# Patient Record
Sex: MALE | Race: WHITE | URBAN - METROPOLITAN AREA
[De-identification: names, ages, dates, MRNs, and addresses within clinical notes are randomized per-mention and may not be internally consistent; named-entity substitution may affect disease eponyms.]

---

## 2017-01-12 RX ORDER — ATORVASTATIN CALCIUM 10 MG/1
TABLET, FILM COATED ORAL
Qty: 90 TABLET | Refills: 2 | Status: SHIPPED | OUTPATIENT
Start: 2017-01-12

## 2017-01-12 RX ORDER — FINASTERIDE 5 MG/1
TABLET, FILM COATED ORAL
Qty: 90 TABLET | Refills: 2 | Status: SHIPPED | OUTPATIENT
Start: 2017-01-12

## 2017-04-03 RX ORDER — LABETALOL 100 MG/1
TABLET, FILM COATED ORAL
Qty: 180 TABLET | Refills: 2 | Status: SHIPPED | OUTPATIENT
Start: 2017-04-03

## 2021-03-30 ENCOUNTER — IMMUNIZATION (OUTPATIENT)
Dept: PRIMARY CARE CLINIC | Age: 71
End: 2021-03-30
Payer: MEDICARE

## 2021-03-30 PROCEDURE — 91301 COVID-19, MODERNA VACCINE 100MCG/0.5ML DOSE: CPT | Performed by: FAMILY MEDICINE

## 2021-03-30 PROCEDURE — 0012A COVID-19, MODERNA VACCINE 100MCG/0.5ML DOSE: CPT | Performed by: FAMILY MEDICINE

## 2024-09-25 ENCOUNTER — APPOINTMENT (OUTPATIENT)
Dept: CT IMAGING | Age: 74
End: 2024-09-25
Payer: MEDICARE

## 2024-09-25 ENCOUNTER — HOSPITAL ENCOUNTER (EMERGENCY)
Age: 74
Discharge: HOME | End: 2024-09-25
Attending: EMERGENCY MEDICINE
Payer: MEDICARE

## 2024-09-25 VITALS
HEART RATE: 68 BPM | BODY MASS INDEX: 24.81 KG/M2 | HEIGHT: 70 IN | TEMPERATURE: 97.8 F | OXYGEN SATURATION: 96 % | SYSTOLIC BLOOD PRESSURE: 136 MMHG | DIASTOLIC BLOOD PRESSURE: 79 MMHG | RESPIRATION RATE: 18 BRPM | WEIGHT: 173.3 LBS

## 2024-09-25 DIAGNOSIS — K85.80 OTHER ACUTE PANCREATITIS WITHOUT INFECTION OR NECROSIS: Primary | ICD-10-CM

## 2024-09-25 DIAGNOSIS — R10.13 ABDOMINAL PAIN, EPIGASTRIC: ICD-10-CM

## 2024-09-25 LAB
ALBUMIN SERPL-MCNC: 4.3 G/DL (ref 3.4–5)
ALBUMIN/GLOB SERPL: 2 {RATIO} (ref 1.1–2.2)
ALP SERPL-CCNC: 54 U/L (ref 40–129)
ALT SERPL-CCNC: 17 U/L (ref 10–40)
ANION GAP SERPL CALCULATED.3IONS-SCNC: 11 MMOL/L (ref 3–16)
AST SERPL-CCNC: 20 U/L (ref 15–37)
BASOPHILS # BLD: 0 K/UL (ref 0–0.2)
BASOPHILS NFR BLD: 0.3 %
BILIRUB SERPL-MCNC: 0.6 MG/DL (ref 0–1)
BILIRUB UR QL STRIP.AUTO: NEGATIVE
BUN SERPL-MCNC: 16 MG/DL (ref 7–20)
CALCIUM SERPL-MCNC: 9.7 MG/DL (ref 8.3–10.6)
CHLORIDE SERPL-SCNC: 106 MMOL/L (ref 99–110)
CLARITY UR: CLEAR
CO2 SERPL-SCNC: 22 MMOL/L (ref 21–32)
COLOR UR: YELLOW
CREAT SERPL-MCNC: 0.9 MG/DL (ref 0.8–1.3)
DEPRECATED RDW RBC AUTO: 13.4 % (ref 12.4–15.4)
EKG ATRIAL RATE: 61 BPM
EKG DIAGNOSIS: NORMAL
EKG P AXIS: 61 DEGREES
EKG P-R INTERVAL: 198 MS
EKG Q-T INTERVAL: 384 MS
EKG QRS DURATION: 90 MS
EKG QTC CALCULATION (BAZETT): 386 MS
EKG R AXIS: -6 DEGREES
EKG T AXIS: 25 DEGREES
EKG VENTRICULAR RATE: 61 BPM
EOSINOPHIL # BLD: 0.1 K/UL (ref 0–0.6)
EOSINOPHIL NFR BLD: 0.6 %
GFR SERPLBLD CREATININE-BSD FMLA CKD-EPI: >90 ML/MIN/{1.73_M2}
GLUCOSE SERPL-MCNC: 157 MG/DL (ref 70–99)
GLUCOSE UR STRIP.AUTO-MCNC: NEGATIVE MG/DL
HCT VFR BLD AUTO: 45.1 % (ref 40.5–52.5)
HGB BLD-MCNC: 14.9 G/DL (ref 13.5–17.5)
HGB UR QL STRIP.AUTO: NEGATIVE
KETONES UR STRIP.AUTO-MCNC: NEGATIVE MG/DL
LEUKOCYTE ESTERASE UR QL STRIP.AUTO: NEGATIVE
LIPASE SERPL-CCNC: 2379 U/L (ref 13–60)
LYMPHOCYTES # BLD: 1.9 K/UL (ref 1–5.1)
LYMPHOCYTES NFR BLD: 13.5 %
MCH RBC QN AUTO: 31.4 PG (ref 26–34)
MCHC RBC AUTO-ENTMCNC: 33.1 G/DL (ref 31–36)
MCV RBC AUTO: 94.9 FL (ref 80–100)
MONOCYTES # BLD: 0.7 K/UL (ref 0–1.3)
MONOCYTES NFR BLD: 4.6 %
NEUTROPHILS # BLD: 11.7 K/UL (ref 1.7–7.7)
NEUTROPHILS NFR BLD: 81 %
NITRITE UR QL STRIP.AUTO: NEGATIVE
PH UR STRIP.AUTO: 7 [PH] (ref 5–8)
PLATELET # BLD AUTO: 179 K/UL (ref 135–450)
PMV BLD AUTO: 9.2 FL (ref 5–10.5)
POTASSIUM SERPL-SCNC: 3.9 MMOL/L (ref 3.5–5.1)
PROT SERPL-MCNC: 6.5 G/DL (ref 6.4–8.2)
PROT UR STRIP.AUTO-MCNC: NEGATIVE MG/DL
RBC # BLD AUTO: 4.75 M/UL (ref 4.2–5.9)
SODIUM SERPL-SCNC: 139 MMOL/L (ref 136–145)
SP GR UR STRIP.AUTO: 1.02 (ref 1–1.03)
TROPONIN, HIGH SENSITIVITY: 8 NG/L (ref 0–22)
UA COMPLETE W REFLEX CULTURE PNL UR: NORMAL
UA DIPSTICK W REFLEX MICRO PNL UR: NORMAL
URN SPEC COLLECT METH UR: NORMAL
UROBILINOGEN UR STRIP-ACNC: 0.2 E.U./DL
WBC # BLD AUTO: 14.4 K/UL (ref 4–11)

## 2024-09-25 PROCEDURE — 80053 COMPREHEN METABOLIC PANEL: CPT

## 2024-09-25 PROCEDURE — 85025 COMPLETE CBC W/AUTO DIFF WBC: CPT

## 2024-09-25 PROCEDURE — 2580000003 HC RX 258

## 2024-09-25 PROCEDURE — 96375 TX/PRO/DX INJ NEW DRUG ADDON: CPT

## 2024-09-25 PROCEDURE — 83690 ASSAY OF LIPASE: CPT

## 2024-09-25 PROCEDURE — 93010 ELECTROCARDIOGRAM REPORT: CPT | Performed by: INTERNAL MEDICINE

## 2024-09-25 PROCEDURE — 81003 URINALYSIS AUTO W/O SCOPE: CPT

## 2024-09-25 PROCEDURE — 84484 ASSAY OF TROPONIN QUANT: CPT

## 2024-09-25 PROCEDURE — 93005 ELECTROCARDIOGRAM TRACING: CPT

## 2024-09-25 PROCEDURE — 6360000002 HC RX W HCPCS

## 2024-09-25 PROCEDURE — 6360000004 HC RX CONTRAST MEDICATION

## 2024-09-25 PROCEDURE — 99285 EMERGENCY DEPT VISIT HI MDM: CPT

## 2024-09-25 PROCEDURE — 74177 CT ABD & PELVIS W/CONTRAST: CPT

## 2024-09-25 PROCEDURE — 96374 THER/PROPH/DIAG INJ IV PUSH: CPT

## 2024-09-25 RX ORDER — KETOROLAC TROMETHAMINE 30 MG/ML
15 INJECTION, SOLUTION INTRAMUSCULAR; INTRAVENOUS ONCE
Status: COMPLETED | OUTPATIENT
Start: 2024-09-25 | End: 2024-09-25

## 2024-09-25 RX ORDER — MORPHINE SULFATE 4 MG/ML
4 INJECTION, SOLUTION INTRAMUSCULAR; INTRAVENOUS
Status: COMPLETED | OUTPATIENT
Start: 2024-09-25 | End: 2024-09-25

## 2024-09-25 RX ORDER — AMLODIPINE BESYLATE 5 MG/1
TABLET ORAL
COMMUNITY
Start: 2024-03-28

## 2024-09-25 RX ORDER — HYDROCODONE BITARTRATE AND ACETAMINOPHEN 5; 325 MG/1; MG/1
1 TABLET ORAL EVERY 6 HOURS PRN
Qty: 12 TABLET | Refills: 0 | Status: SHIPPED | OUTPATIENT
Start: 2024-09-25 | End: 2024-09-28

## 2024-09-25 RX ORDER — ONDANSETRON 4 MG/1
4 TABLET, FILM COATED ORAL 3 TIMES DAILY PRN
Qty: 15 TABLET | Refills: 0 | Status: SHIPPED | OUTPATIENT
Start: 2024-09-25

## 2024-09-25 RX ORDER — SODIUM CHLORIDE, SODIUM LACTATE, POTASSIUM CHLORIDE, AND CALCIUM CHLORIDE .6; .31; .03; .02 G/100ML; G/100ML; G/100ML; G/100ML
1000 INJECTION, SOLUTION INTRAVENOUS ONCE
Status: COMPLETED | OUTPATIENT
Start: 2024-09-25 | End: 2024-09-25

## 2024-09-25 RX ORDER — IOPAMIDOL 755 MG/ML
75 INJECTION, SOLUTION INTRAVASCULAR
Status: COMPLETED | OUTPATIENT
Start: 2024-09-25 | End: 2024-09-25

## 2024-09-25 RX ORDER — ONDANSETRON 2 MG/ML
4 INJECTION INTRAMUSCULAR; INTRAVENOUS ONCE
Status: COMPLETED | OUTPATIENT
Start: 2024-09-25 | End: 2024-09-25

## 2024-09-25 RX ADMIN — ONDANSETRON 4 MG: 2 INJECTION INTRAMUSCULAR; INTRAVENOUS at 09:08

## 2024-09-25 RX ADMIN — IOPAMIDOL 75 ML: 755 INJECTION, SOLUTION INTRAVENOUS at 11:28

## 2024-09-25 RX ADMIN — MORPHINE SULFATE 4 MG: 4 INJECTION, SOLUTION INTRAMUSCULAR; INTRAVENOUS at 09:09

## 2024-09-25 RX ADMIN — SODIUM CHLORIDE, POTASSIUM CHLORIDE, SODIUM LACTATE AND CALCIUM CHLORIDE 1000 ML: 600; 310; 30; 20 INJECTION, SOLUTION INTRAVENOUS at 09:08

## 2024-09-25 RX ADMIN — KETOROLAC TROMETHAMINE 15 MG: 30 INJECTION, SOLUTION INTRAMUSCULAR at 11:53

## 2024-09-25 ASSESSMENT — PAIN DESCRIPTION - ORIENTATION: ORIENTATION: MID

## 2024-09-25 ASSESSMENT — PAIN SCALES - GENERAL
PAINLEVEL_OUTOF10: 5
PAINLEVEL_OUTOF10: 5
PAINLEVEL_OUTOF10: 7

## 2024-09-25 ASSESSMENT — PAIN DESCRIPTION - PAIN TYPE: TYPE: ACUTE PAIN

## 2024-09-25 ASSESSMENT — PAIN DESCRIPTION - DESCRIPTORS: DESCRIPTORS: TENDER;SHARP

## 2024-09-25 ASSESSMENT — PAIN DESCRIPTION - LOCATION
LOCATION: ABDOMEN
LOCATION: ABDOMEN

## 2024-09-25 ASSESSMENT — LIFESTYLE VARIABLES
HOW OFTEN DO YOU HAVE A DRINK CONTAINING ALCOHOL: NEVER
HOW MANY STANDARD DRINKS CONTAINING ALCOHOL DO YOU HAVE ON A TYPICAL DAY: PATIENT DOES NOT DRINK

## 2024-09-25 ASSESSMENT — PAIN - FUNCTIONAL ASSESSMENT: PAIN_FUNCTIONAL_ASSESSMENT: 0-10

## 2024-10-09 RX ORDER — ASCORBIC ACID 500 MG
500 TABLET ORAL DAILY
COMMUNITY

## 2024-10-09 RX ORDER — OMEGA-3S/DHA/EPA/FISH OIL/D3 300MG-1000
400 CAPSULE ORAL DAILY
COMMUNITY

## 2024-10-09 RX ORDER — VITAMIN E 268 MG
400 CAPSULE ORAL DAILY
COMMUNITY

## 2024-10-09 RX ORDER — CHLORAL HYDRATE 500 MG
CAPSULE ORAL DAILY
COMMUNITY

## 2024-10-09 RX ORDER — ASCORBIC ACID 1000 MG
TABLET ORAL
COMMUNITY

## 2024-10-09 RX ORDER — M-VIT,TX,IRON,MINS/CALC/FOLIC 27MG-0.4MG
1 TABLET ORAL DAILY
COMMUNITY

## 2024-10-09 NOTE — PROGRESS NOTES
[facility] PRE-OPERATIVE INSTRUCTIONS       DOS: __10/23/2024__        Pre-Op Instructions     Patients receiving local anesthetic only will arrive one hour prior to the procedure, all other patients will arrive 1.5 hours prior to procedure time.    [x]  A History and Physical will be required within 30 days prior to surgery date. Some patients may require cardiac or pulmonary clearance. H&P will be completed DOS for Endo/colonoscopy patients.     [x]  Reviewed Medical and Surgical history, medication list, confirmed with patient any implants, allergies, bleeding disorders, DANO and reactions to Anesthesia.    [x]  If there is a change in physical condition between now and the day of surgery, please notify your surgeon. This includes a cough, cold, fever, sore throat, nausea, vomiting and diarrhea. Also notify your surgeon if you experience dizziness, shortness of breath or blurred vision.    [x] Reviewed hx of C-Diff, MRSA, VRE and/or recent use of Antibiotics     [x]  All patients having a procedure must have a ride home by a responsible person that is over the age of 18 and ensure it is someone that we can share medical information with. After discharge, a responsible adult needs to stay with you for 24 hours. There is a limit of 2 adult visitors per room.     If unable to secure ride and/or care taker, please contact surgeon's office.      [x]  No alcohol, smoking or marijuana use 24 hours prior to surgery. Any use of recreational drugs must be stopped 5 days prior to surgery.     [x]  NPO after midnight (Any heart, BP, seizure, thyroid and breathing medications are okay to take the morning of surgery with a small sip of water 4 hours prior to procedure).    The morning of surgery, you may brush your teeth, just no swallowing water. Also, NO gum, candy, mints or ice chips.    []  For Colonoscopy's, follow prep-instructions as indicated by physician.     []  Patients with a insulin pump, keep set

## 2024-10-22 ENCOUNTER — ANESTHESIA EVENT (OUTPATIENT)
Age: 74
End: 2024-10-22
Payer: MEDICARE

## 2024-10-23 ENCOUNTER — HOSPITAL ENCOUNTER (OUTPATIENT)
Age: 74
Setting detail: OUTPATIENT SURGERY
Discharge: HOME | End: 2024-10-23
Attending: INTERNAL MEDICINE | Admitting: INTERNAL MEDICINE
Payer: MEDICARE

## 2024-10-23 ENCOUNTER — ANESTHESIA (OUTPATIENT)
Age: 74
End: 2024-10-23
Payer: MEDICARE

## 2024-10-23 ENCOUNTER — APPOINTMENT (OUTPATIENT)
Age: 74
End: 2024-10-23
Attending: INTERNAL MEDICINE
Payer: MEDICARE

## 2024-10-23 VITALS
WEIGHT: 168 LBS | OXYGEN SATURATION: 98 % | BODY MASS INDEX: 24.05 KG/M2 | HEIGHT: 70 IN | SYSTOLIC BLOOD PRESSURE: 155 MMHG | TEMPERATURE: 97.2 F | HEART RATE: 64 BPM | DIASTOLIC BLOOD PRESSURE: 86 MMHG | RESPIRATION RATE: 20 BRPM

## 2024-10-23 DIAGNOSIS — K85.00 IDIOPATHIC ACUTE PANCREATITIS WITHOUT INFECTION OR NECROSIS: ICD-10-CM

## 2024-10-23 PROCEDURE — 2580000003 HC RX 258: Performed by: ANESTHESIOLOGY

## 2024-10-23 PROCEDURE — 6370000000 HC RX 637 (ALT 250 FOR IP): Performed by: INTERNAL MEDICINE

## 2024-10-23 PROCEDURE — 7100000010 HC PHASE II RECOVERY - FIRST 15 MIN: Performed by: INTERNAL MEDICINE

## 2024-10-23 PROCEDURE — C1753 CATH, INTRAVAS ULTRASOUND: HCPCS | Performed by: INTERNAL MEDICINE

## 2024-10-23 PROCEDURE — 7100000011 HC PHASE II RECOVERY - ADDTL 15 MIN: Performed by: INTERNAL MEDICINE

## 2024-10-23 PROCEDURE — 3700000000 HC ANESTHESIA ATTENDED CARE: Performed by: INTERNAL MEDICINE

## 2024-10-23 PROCEDURE — 3609018500 HC EGD US SCOPE W/ADJACENT STRUCTURES: Performed by: INTERNAL MEDICINE

## 2024-10-23 PROCEDURE — 86301 IMMUNOASSAY TUMOR CA 19-9: CPT

## 2024-10-23 PROCEDURE — 7100000000 HC PACU RECOVERY - FIRST 15 MIN: Performed by: INTERNAL MEDICINE

## 2024-10-23 PROCEDURE — 3609015100 HC ERCP STENT PLACEMENT BILIARY/PANCREATIC DUCT: Performed by: INTERNAL MEDICINE

## 2024-10-23 PROCEDURE — 6360000002 HC RX W HCPCS: Performed by: NURSE ANESTHETIST, CERTIFIED REGISTERED

## 2024-10-23 PROCEDURE — 6360000004 HC RX CONTRAST MEDICATION: Performed by: INTERNAL MEDICINE

## 2024-10-23 PROCEDURE — C2625 STENT, NON-COR, TEM W/DEL SY: HCPCS | Performed by: INTERNAL MEDICINE

## 2024-10-23 PROCEDURE — 7100000001 HC PACU RECOVERY - ADDTL 15 MIN: Performed by: INTERNAL MEDICINE

## 2024-10-23 PROCEDURE — 3609014900 HC ERCP W/SPHINCTEROTOMY &/OR PAPILLOTOMY: Performed by: INTERNAL MEDICINE

## 2024-10-23 PROCEDURE — 2720000010 HC SURG SUPPLY STERILE: Performed by: INTERNAL MEDICINE

## 2024-10-23 PROCEDURE — 6360000002 HC RX W HCPCS: Performed by: INTERNAL MEDICINE

## 2024-10-23 PROCEDURE — 2709999900 HC NON-CHARGEABLE SUPPLY: Performed by: INTERNAL MEDICINE

## 2024-10-23 PROCEDURE — 88112 CYTOPATH CELL ENHANCE TECH: CPT

## 2024-10-23 PROCEDURE — 2580000003 HC RX 258: Performed by: NURSE ANESTHETIST, CERTIFIED REGISTERED

## 2024-10-23 PROCEDURE — 3700000001 HC ADD 15 MINUTES (ANESTHESIA): Performed by: INTERNAL MEDICINE

## 2024-10-23 PROCEDURE — 88304 TISSUE EXAM BY PATHOLOGIST: CPT

## 2024-10-23 PROCEDURE — 3609015200 HC ERCP REMOVE CALCULI/DEBRIS BILIARY/PANCREAS DUCT: Performed by: INTERNAL MEDICINE

## 2024-10-23 PROCEDURE — 88305 TISSUE EXAM BY PATHOLOGIST: CPT

## 2024-10-23 PROCEDURE — 2500000003 HC RX 250 WO HCPCS: Performed by: NURSE ANESTHETIST, CERTIFIED REGISTERED

## 2024-10-23 PROCEDURE — 74328 X-RAY BILE DUCT ENDOSCOPY: CPT

## 2024-10-23 DEVICE — OASIS ONE ACTION STENT INTRODUCTION SYSTEM, WITH PRE-LOADED COTTON LEUNG BILIARY STENT
Type: IMPLANTABLE DEVICE | Status: FUNCTIONAL
Brand: OASIS

## 2024-10-23 RX ORDER — SODIUM CHLORIDE 0.9 % (FLUSH) 0.9 %
5-40 SYRINGE (ML) INJECTION PRN
Status: DISCONTINUED | OUTPATIENT
Start: 2024-10-23 | End: 2024-10-23 | Stop reason: HOSPADM

## 2024-10-23 RX ORDER — GLYCOPYRROLATE 0.2 MG/ML
INJECTION INTRAMUSCULAR; INTRAVENOUS
Status: DISCONTINUED | OUTPATIENT
Start: 2024-10-23 | End: 2024-10-23 | Stop reason: SDUPTHER

## 2024-10-23 RX ORDER — SODIUM CHLORIDE 9 MG/ML
INJECTION, SOLUTION INTRAVENOUS PRN
Status: DISCONTINUED | OUTPATIENT
Start: 2024-10-23 | End: 2024-10-23 | Stop reason: HOSPADM

## 2024-10-23 RX ORDER — DIPHENHYDRAMINE HYDROCHLORIDE 50 MG/ML
12.5 INJECTION INTRAMUSCULAR; INTRAVENOUS
Status: DISCONTINUED | OUTPATIENT
Start: 2024-10-23 | End: 2024-10-23 | Stop reason: HOSPADM

## 2024-10-23 RX ORDER — CIPROFLOXACIN 2 MG/ML
400 INJECTION, SOLUTION INTRAVENOUS ONCE
Status: COMPLETED | OUTPATIENT
Start: 2024-10-23 | End: 2024-10-23

## 2024-10-23 RX ORDER — FENTANYL CITRATE 50 UG/ML
50 INJECTION, SOLUTION INTRAMUSCULAR; INTRAVENOUS EVERY 5 MIN PRN
Status: DISCONTINUED | OUTPATIENT
Start: 2024-10-23 | End: 2024-10-23 | Stop reason: HOSPADM

## 2024-10-23 RX ORDER — SUCCINYLCHOLINE/SOD CL,ISO/PF 200MG/10ML
SYRINGE (ML) INTRAVENOUS
Status: DISCONTINUED | OUTPATIENT
Start: 2024-10-23 | End: 2024-10-23 | Stop reason: SDUPTHER

## 2024-10-23 RX ORDER — SODIUM CHLORIDE 0.9 % (FLUSH) 0.9 %
5-40 SYRINGE (ML) INJECTION EVERY 12 HOURS SCHEDULED
Status: DISCONTINUED | OUTPATIENT
Start: 2024-10-23 | End: 2024-10-23 | Stop reason: HOSPADM

## 2024-10-23 RX ORDER — HALOPERIDOL 5 MG/ML
1 INJECTION INTRAMUSCULAR
Status: DISCONTINUED | OUTPATIENT
Start: 2024-10-23 | End: 2024-10-23 | Stop reason: HOSPADM

## 2024-10-23 RX ORDER — LORAZEPAM 2 MG/ML
0.5 INJECTION INTRAMUSCULAR
Status: DISCONTINUED | OUTPATIENT
Start: 2024-10-23 | End: 2024-10-23 | Stop reason: HOSPADM

## 2024-10-23 RX ORDER — FENTANYL CITRATE 50 UG/ML
INJECTION, SOLUTION INTRAMUSCULAR; INTRAVENOUS
Status: DISCONTINUED | OUTPATIENT
Start: 2024-10-23 | End: 2024-10-23 | Stop reason: SDUPTHER

## 2024-10-23 RX ORDER — NALOXONE HYDROCHLORIDE 0.4 MG/ML
INJECTION, SOLUTION INTRAMUSCULAR; INTRAVENOUS; SUBCUTANEOUS PRN
Status: DISCONTINUED | OUTPATIENT
Start: 2024-10-23 | End: 2024-10-23 | Stop reason: HOSPADM

## 2024-10-23 RX ORDER — ONDANSETRON 2 MG/ML
4 INJECTION INTRAMUSCULAR; INTRAVENOUS
Status: DISCONTINUED | OUTPATIENT
Start: 2024-10-23 | End: 2024-10-23 | Stop reason: HOSPADM

## 2024-10-23 RX ORDER — SODIUM CHLORIDE 9 MG/ML
INJECTION, SOLUTION INTRAVENOUS PRN
Status: DISCONTINUED | OUTPATIENT
Start: 2024-10-23 | End: 2024-10-23

## 2024-10-23 RX ORDER — SODIUM CHLORIDE 9 MG/ML
INJECTION, SOLUTION INTRAVENOUS
Status: DISCONTINUED | OUTPATIENT
Start: 2024-10-23 | End: 2024-10-23 | Stop reason: SDUPTHER

## 2024-10-23 RX ORDER — LIDOCAINE HYDROCHLORIDE 20 MG/ML
INJECTION, SOLUTION INTRAVENOUS
Status: DISCONTINUED | OUTPATIENT
Start: 2024-10-23 | End: 2024-10-23 | Stop reason: SDUPTHER

## 2024-10-23 RX ORDER — ONDANSETRON 2 MG/ML
INJECTION INTRAMUSCULAR; INTRAVENOUS
Status: DISCONTINUED | OUTPATIENT
Start: 2024-10-23 | End: 2024-10-23 | Stop reason: SDUPTHER

## 2024-10-23 RX ORDER — INDOMETHACIN 100 MG
100 SUPPOSITORY, RECTAL RECTAL ONCE
Status: COMPLETED | OUTPATIENT
Start: 2024-10-23 | End: 2024-10-23

## 2024-10-23 RX ORDER — DEXAMETHASONE SODIUM PHOSPHATE 4 MG/ML
INJECTION, SOLUTION INTRA-ARTICULAR; INTRALESIONAL; INTRAMUSCULAR; INTRAVENOUS; SOFT TISSUE
Status: DISCONTINUED | OUTPATIENT
Start: 2024-10-23 | End: 2024-10-23 | Stop reason: SDUPTHER

## 2024-10-23 RX ORDER — OXYCODONE AND ACETAMINOPHEN 5; 325 MG/1; MG/1
1 TABLET ORAL EVERY 8 HOURS PRN
Qty: 21 TABLET | Refills: 0 | Status: SHIPPED | OUTPATIENT
Start: 2024-10-23 | End: 2024-10-30

## 2024-10-23 RX ORDER — MEPERIDINE HYDROCHLORIDE 25 MG/ML
12.5 INJECTION INTRAMUSCULAR; INTRAVENOUS; SUBCUTANEOUS
Status: DISCONTINUED | OUTPATIENT
Start: 2024-10-23 | End: 2024-10-23 | Stop reason: HOSPADM

## 2024-10-23 RX ORDER — ROCURONIUM BROMIDE 10 MG/ML
INJECTION, SOLUTION INTRAVENOUS
Status: DISCONTINUED | OUTPATIENT
Start: 2024-10-23 | End: 2024-10-23 | Stop reason: SDUPTHER

## 2024-10-23 RX ORDER — PROPOFOL 10 MG/ML
INJECTION, EMULSION INTRAVENOUS
Status: DISCONTINUED | OUTPATIENT
Start: 2024-10-23 | End: 2024-10-23 | Stop reason: SDUPTHER

## 2024-10-23 RX ORDER — IOPAMIDOL 755 MG/ML
INJECTION, SOLUTION INTRAVASCULAR PRN
Status: DISCONTINUED | OUTPATIENT
Start: 2024-10-23 | End: 2024-10-23 | Stop reason: ALTCHOICE

## 2024-10-23 RX ADMIN — SODIUM CHLORIDE: 9 INJECTION, SOLUTION INTRAVENOUS at 08:30

## 2024-10-23 RX ADMIN — ROCURONIUM BROMIDE 20 MG: 10 INJECTION, SOLUTION INTRAVENOUS at 10:47

## 2024-10-23 RX ADMIN — DEXAMETHASONE SODIUM PHOSPHATE 8 MG: 4 INJECTION, SOLUTION INTRAMUSCULAR; INTRAVENOUS at 10:19

## 2024-10-23 RX ADMIN — FENTANYL CITRATE 50 MCG: 50 INJECTION, SOLUTION INTRAMUSCULAR; INTRAVENOUS at 10:49

## 2024-10-23 RX ADMIN — SUGAMMADEX 200 MG: 100 INJECTION, SOLUTION INTRAVENOUS at 11:14

## 2024-10-23 RX ADMIN — PROPOFOL 150 MG: 10 INJECTION, EMULSION INTRAVENOUS at 10:14

## 2024-10-23 RX ADMIN — CIPROFLOXACIN 400 MG: 400 INJECTION, SOLUTION INTRAVENOUS at 10:49

## 2024-10-23 RX ADMIN — ROCURONIUM BROMIDE 10 MG: 10 INJECTION, SOLUTION INTRAVENOUS at 10:14

## 2024-10-23 RX ADMIN — LIDOCAINE HYDROCHLORIDE 80 MG: 20 INJECTION, SOLUTION INTRAVENOUS at 10:13

## 2024-10-23 RX ADMIN — Medication 160 MG: at 10:15

## 2024-10-23 RX ADMIN — ONDANSETRON 4 MG: 2 INJECTION, SOLUTION INTRAMUSCULAR; INTRAVENOUS at 10:19

## 2024-10-23 RX ADMIN — SODIUM CHLORIDE: 9 INJECTION, SOLUTION INTRAVENOUS at 10:07

## 2024-10-23 RX ADMIN — GLYCOPYRROLATE 0.2 MG: 0.2 INJECTION, SOLUTION INTRAMUSCULAR; INTRAVENOUS at 10:13

## 2024-10-23 RX ADMIN — FENTANYL CITRATE 50 MCG: 50 INJECTION, SOLUTION INTRAMUSCULAR; INTRAVENOUS at 10:13

## 2024-10-23 RX ADMIN — ROCURONIUM BROMIDE 15 MG: 10 INJECTION, SOLUTION INTRAVENOUS at 10:19

## 2024-10-23 RX ADMIN — Medication 100 MG: at 10:49

## 2024-10-23 ASSESSMENT — PAIN SCALES - GENERAL
PAINLEVEL_OUTOF10: 0

## 2024-10-23 ASSESSMENT — LIFESTYLE VARIABLES: SMOKING_STATUS: 0

## 2024-10-23 NOTE — ANESTHESIA POSTPROCEDURE EVALUATION
Department of Anesthesiology  Postprocedure Note    Patient: Edward Barlow  MRN: [id number]  YOB: 1950  Date of evaluation: 10/23/2024    Procedure Summary       Date: 10/23/24 Room / Location: [place] [address] / [facility]    Anesthesia Start: 1008 Anesthesia Stop: 1124    Procedures:       ENDOSCOPIC RETROGRADE CHOLANGIOPANCREATOGRAPHY SPHINCTER/PAPILLOTOMY      ENDOSCOPIC ULTRASOUND with biopsy      ENDOSCOPIC RETROGRADE CHOLANGIOPANCREATOGRAPHY STONE REMOVAL      ENDOSCOPIC RETROGRADE CHOLANGIOPANCREATOGRAPHY STENT INSERTION Diagnosis:       Idiopathic acute pancreatitis without infection or necrosis      (Idiopathic acute pancreatitis without infection or necrosis [K85.00])    Surgeons: Tom Dias MD Responsible Provider: Marcello Sher MD    Anesthesia Type: general ASA Status: 3            Anesthesia Type: No value filed.    Adrianna Phase I: Adrianna Score: 6    Adrianna Phase II:      Anesthesia Post Evaluation    Patient location during evaluation: PACU  Patient participation: complete - patient participated  Level of consciousness: awake and alert  Pain score: 0  Nausea & Vomiting: no nausea  Cardiovascular status: hemodynamically stable  Respiratory status: acceptable  Hydration status: stable  Pain management: adequate    No notable events documented.

## 2024-10-23 NOTE — DISCHARGE INSTRUCTIONS
pain, increased abdominal distention, shortness of breath or any other problems.  If you have a sore throat, you may use lozenges or salt water gargles.    ANESTHESIA DISCHARGE INSTRUCTIONS    Wear your seatbelt home.  You are under the influence of drugs-do not drink alcohol,drive,operate machinery,or make any important decisions or sign any legal documentsfor 24 hours  A responsible adult needs to be with you for 24 hours.  You may experience lightheadedness,dizziness,or sleepiness following surgery.  Rest at home today- increase activity as tolerated.  Progress slowly to a regular diet unless your physician has instructed you otherwise.Drink plenty of water.  If nausea becomes a problem call your physician.  Coughing,sore throat,and muscle aches are other side effects of anesthesia,and should improve with time.  Do not drive,operate machinery while taking narcotics.     Your I.V. Site: Care Instructions  Overview     Medicines or fluids may be given through an intravenous (I.V.) tube inserted into a vein. The I.V. is most often placed in the back of the hand, on the forearm, or on the inside of the elbow.  When the I.V. is in place, medicines or fluids can go quickly into the bloodstream and into the rest of the body. The I.V. can also be used to take blood for testing.  If you had an I.V. while you were in the hospital, the area where it went into your body may be tender for a while.  Follow-up care is a key part of your treatment and safety. Be sure to make and go to all appointments, and call your doctor if you are having problems. It's also a good idea to know your test results and keep a list of the medicines you take.  How can you care for yourself at home?  Check the area for bruising or swelling for a few days after you get home.  If you have bruising or swelling, put ice or a cold pack on the area for 10 to 20 minutes at a time. Put a thin cloth between the ice and your skin.  Shower or bathe as

## 2024-10-23 NOTE — PROGRESS NOTES
Ambulatory Surgery/Procedure Discharge Note    Vitals:    10/23/24 1242   BP: (!) 155/86   Pulse: 64   Resp: 20   Temp: 97.2 °F (36.2 °C)   SpO2: 98%       In: 1200 [I.V.:1000]  Out: 0     Restroom use offered before discharge.  Yes    Pain assessment:  none  Pain Level: 0    Pt states \"ready to go home\". Pt alert and oriented x4. IV removed. Denies N/V. Discharge instructions given to pt and wife, verbalized understanding of all instructions. Left with all belongings and discharge instructions.     Patient discharged to home/self care. Patient discharged via wheel chair by RN to waiting family/S.O.       10/23/2024 1:02 PM

## 2024-10-23 NOTE — PROGRESS NOTES
[employer] has verified that procedural consent is correctly filled out prior to the start of the procedure.     ERBE pad removed. Skin warm, dry, intact.

## 2024-10-23 NOTE — H&P
triamcinolone (ARISTOCORT) 0.5 % cream Apply topically 3 times daily. 4 Tube 6    aspirin 81 MG EC tablet Take 1 tablet by mouth daily      ondansetron (ZOFRAN) 4 MG tablet Take 1 tablet by mouth 3 times daily as needed for Nausea or Vomiting (Patient not taking: Reported on 10/9/2024) 15 tablet 0    azelastine (ASTELIN) 0.1 % nasal spray 2 sprays by Nasal route 2 times daily Use in each nostril as directed (Patient not taking: Reported on 10/9/2024) 30 mL 3        Allergies:  Gluten meal      Social History:   Social History     Tobacco Use    Smoking status: Never    Smokeless tobacco: Never   Substance Use Topics    Alcohol use: No     Family History:   Family History   Problem Relation Age of Onset    Diabetes Mother     Heart Disease Father     Diabetes Sister        PHYSICAL EXAM:      /81   Pulse 67   Temp 98.3 °F (36.8 °C) (Infrared)   Resp 14   Ht 1.778 m (5' 10\")   Wt 76.2 kg (168 lb)   SpO2 99%   BMI 24.11 kg/m²  I        Heart:   RRR, normal s1s2    Lungs:  CTA bilat,  Normal effort    Abdomen:   NT, ND      ASA Grade:  ASA 3 - Patient with moderate systemic disease with functional limitations    Mallampati Class: 2          ASSESSMENT AND PLAN:    1.  Patient is a 73 y.o. male here for endoscopic ultrasound and ERCP.   2.  Procedure options, risks and benefits reviewed with patient.  Patient expresses understanding.    Tom Dias MD,   [facility]  10/23/2024

## 2024-10-23 NOTE — PROGRESS NOTES
Pt received from Endo to PACU # 8.     Post: Procedure(s):  ENDOSCOPIC RETROGRADE CHOLANGIOPANCREATOGRAPHY SPHINCTER/PAPILLOTOMY  ENDOSCOPIC ULTRASOUND with biopsy  ENDOSCOPIC RETROGRADE CHOLANGIOPANCREATOGRAPHY STONE REMOVAL  ENDOSCOPIC RETROGRADE CHOLANGIOPANCREATOGRAPHY STENT INSERTION    Report received from CRNA and OR RN.    Pt sleeping, not fully wakeful from anesthesia. Respirations even and unlabored.      Attached to PACU monitoring system. Alarms and parameters set    No signs of pain or nausea at this time.

## 2024-10-24 LAB
NON-GYN CYTOLOGY REPORT: NORMAL
SURGICAL PATHOLOGY REPORT: NORMAL

## 2024-10-28 LAB — CANCER AG19-9 SERPL IA-ACNC: 24 U/ML (ref 0–35)

## 2024-11-18 DIAGNOSIS — K83.1 BILE DUCT OBSTRUCTION: Primary | ICD-10-CM

## 2024-11-19 ENCOUNTER — APPOINTMENT (OUTPATIENT)
Age: 74
DRG: 439 | End: 2024-11-19
Payer: MEDICARE

## 2024-11-19 ENCOUNTER — HOSPITAL ENCOUNTER (INPATIENT)
Age: 74
LOS: 2 days | Discharge: HOME | DRG: 439 | End: 2024-11-21
Attending: EMERGENCY MEDICINE | Admitting: INTERNAL MEDICINE
Payer: MEDICARE

## 2024-11-19 ENCOUNTER — ANESTHESIA EVENT (OUTPATIENT)
Age: 74
DRG: 439 | End: 2024-11-19
Payer: MEDICARE

## 2024-11-19 DIAGNOSIS — K85.00 IDIOPATHIC ACUTE PANCREATITIS WITHOUT INFECTION OR NECROSIS: ICD-10-CM

## 2024-11-19 DIAGNOSIS — D64.9 ANEMIA, UNSPECIFIED TYPE: ICD-10-CM

## 2024-11-19 DIAGNOSIS — D72.829 LEUKOCYTOSIS, UNSPECIFIED TYPE: ICD-10-CM

## 2024-11-19 DIAGNOSIS — K20.90 ESOPHAGITIS: ICD-10-CM

## 2024-11-19 DIAGNOSIS — K85.90 ACUTE PANCREATITIS, UNSPECIFIED COMPLICATION STATUS, UNSPECIFIED PANCREATITIS TYPE: Primary | ICD-10-CM

## 2024-11-19 DIAGNOSIS — R10.13 EPIGASTRIC PAIN: ICD-10-CM

## 2024-11-19 PROBLEM — K85.91 ACUTE NECROTIZING PANCREATITIS: Status: ACTIVE | Noted: 2024-11-19

## 2024-11-19 LAB
ALBUMIN SERPL-MCNC: 3.4 G/DL (ref 3.4–5)
ALBUMIN/GLOB SERPL: 1 {RATIO}
ALP SERPL-CCNC: 104 U/L (ref 40–129)
ALT SERPL-CCNC: 27 U/L (ref 10–40)
ANION GAP SERPL CALCULATED.3IONS-SCNC: 11 MMOL/L (ref 3–16)
AST SERPL-CCNC: 23 U/L (ref 15–37)
BASOPHILS # BLD: 0.02 K/UL (ref 0–0.2)
BASOPHILS NFR BLD: 0 %
BILIRUB SERPL-MCNC: 0.5 MG/DL (ref 0–1)
BUN SERPL-MCNC: 16 MG/DL (ref 7–20)
CALCIUM SERPL-MCNC: 10 MG/DL (ref 8.3–10.6)
CHLORIDE SERPL-SCNC: 102 MMOL/L (ref 99–110)
CO2 SERPL-SCNC: 26 MMOL/L (ref 21–32)
CREAT SERPL-MCNC: 0.8 MG/DL (ref 0.8–1.3)
EKG ATRIAL RATE: 74 BPM
EKG DIAGNOSIS: NORMAL
EKG P-R INTERVAL: 174 MS
EKG Q-T INTERVAL: 370 MS
EKG QRS DURATION: 80 MS
EKG QTC CALCULATION (BAZETT): 410 MS
EKG R AXIS: 2 DEGREES
EKG T AXIS: 18 DEGREES
EKG VENTRICULAR RATE: 74 BPM
EOSINOPHIL # BLD: 0.12 K/UL (ref 0–0.6)
EOSINOPHILS RELATIVE PERCENT: 1 %
ERYTHROCYTE [DISTWIDTH] IN BLOOD BY AUTOMATED COUNT: 12.7 % (ref 12.4–15.4)
GFR, ESTIMATED: >90 ML/MIN/1.73M2
GLUCOSE SERPL-MCNC: 114 MG/DL (ref 70–99)
HCT VFR BLD AUTO: 39.2 % (ref 40.5–52.5)
HGB BLD-MCNC: 13.3 G/DL (ref 13.5–17.5)
IMM GRANULOCYTES # BLD AUTO: 0.09 K/UL (ref 0–0.5)
IMM GRANULOCYTES NFR BLD: 0 %
LIPASE SERPL-CCNC: 14 U/L (ref 13–60)
LYMPHOCYTES NFR BLD: 2.14 K/UL (ref 1–5.1)
LYMPHOCYTES RELATIVE PERCENT: 11 %
MCH RBC QN AUTO: 30.9 PG (ref 26–34)
MCHC RBC AUTO-ENTMCNC: 33.9 G/DL (ref 31–36)
MCV RBC AUTO: 91 FL (ref 80–100)
MONOCYTES NFR BLD: 1.02 K/UL (ref 0–1.3)
MONOCYTES NFR BLD: 5 %
NEUTROPHILS NFR BLD: 83 %
NEUTS SEG NFR BLD: 16.75 K/UL (ref 1.7–7.7)
PLATELET # BLD AUTO: 379 K/UL (ref 135–450)
PMV BLD AUTO: 9.4 FL
POTASSIUM SERPL-SCNC: 4.3 MMOL/L (ref 3.5–5.1)
PROT SERPL-MCNC: 6.9 G/DL (ref 6.4–8.2)
RBC # BLD AUTO: 4.31 M/UL (ref 4.2–5.9)
SODIUM SERPL-SCNC: 138 MMOL/L (ref 136–145)
TRIGL SERPL-MCNC: 72 MG/DL (ref 0–149)
TROPONIN I SERPL HS-MCNC: 11 NG/L (ref 0–22)
WBC OTHER # BLD: 20.1 K/UL (ref 4–11)

## 2024-11-19 PROCEDURE — 85025 COMPLETE CBC W/AUTO DIFF WBC: CPT

## 2024-11-19 PROCEDURE — 2580000003 HC RX 258: Performed by: INTERNAL MEDICINE

## 2024-11-19 PROCEDURE — 2580000003 HC RX 258: Performed by: EMERGENCY MEDICINE

## 2024-11-19 PROCEDURE — 6360000004 HC RX CONTRAST MEDICATION: Performed by: EMERGENCY MEDICINE

## 2024-11-19 PROCEDURE — 6360000002 HC RX W HCPCS: Performed by: INTERNAL MEDICINE

## 2024-11-19 PROCEDURE — 93005 ELECTROCARDIOGRAM TRACING: CPT | Performed by: EMERGENCY MEDICINE

## 2024-11-19 PROCEDURE — 93010 ELECTROCARDIOGRAM REPORT: CPT | Performed by: INTERNAL MEDICINE

## 2024-11-19 PROCEDURE — 93005 ELECTROCARDIOGRAM TRACING: CPT

## 2024-11-19 PROCEDURE — 80053 COMPREHEN METABOLIC PANEL: CPT

## 2024-11-19 PROCEDURE — 99285 EMERGENCY DEPT VISIT HI MDM: CPT

## 2024-11-19 PROCEDURE — 84484 ASSAY OF TROPONIN QUANT: CPT

## 2024-11-19 PROCEDURE — 74177 CT ABD & PELVIS W/CONTRAST: CPT

## 2024-11-19 PROCEDURE — 84478 ASSAY OF TRIGLYCERIDES: CPT

## 2024-11-19 PROCEDURE — 96360 HYDRATION IV INFUSION INIT: CPT

## 2024-11-19 PROCEDURE — 36415 COLL VENOUS BLD VENIPUNCTURE: CPT

## 2024-11-19 PROCEDURE — 83690 ASSAY OF LIPASE: CPT

## 2024-11-19 PROCEDURE — 6370000000 HC RX 637 (ALT 250 FOR IP): Performed by: INTERNAL MEDICINE

## 2024-11-19 PROCEDURE — 2060000000 HC ICU INTERMEDIATE R&B

## 2024-11-19 RX ORDER — IOPAMIDOL 755 MG/ML
75 INJECTION, SOLUTION INTRAVASCULAR
Status: COMPLETED | OUTPATIENT
Start: 2024-11-19 | End: 2024-11-19

## 2024-11-19 RX ORDER — MORPHINE SULFATE 2 MG/ML
2 INJECTION, SOLUTION INTRAMUSCULAR; INTRAVENOUS
Status: DISCONTINUED | OUTPATIENT
Start: 2024-11-19 | End: 2024-11-21 | Stop reason: HOSPADM

## 2024-11-19 RX ORDER — ONDANSETRON 2 MG/ML
4 INJECTION INTRAMUSCULAR; INTRAVENOUS EVERY 6 HOURS PRN
Status: DISCONTINUED | OUTPATIENT
Start: 2024-11-19 | End: 2024-11-21 | Stop reason: HOSPADM

## 2024-11-19 RX ORDER — DEXTROSE MONOHYDRATE AND SODIUM CHLORIDE 5; .9 G/100ML; G/100ML
INJECTION, SOLUTION INTRAVENOUS CONTINUOUS
Status: DISCONTINUED | OUTPATIENT
Start: 2024-11-19 | End: 2024-11-21 | Stop reason: HOSPADM

## 2024-11-19 RX ORDER — POTASSIUM CHLORIDE 29.8 MG/ML
20 INJECTION INTRAVENOUS PRN
Status: DISCONTINUED | OUTPATIENT
Start: 2024-11-19 | End: 2024-11-21 | Stop reason: HOSPADM

## 2024-11-19 RX ORDER — AMLODIPINE BESYLATE 5 MG/1
5 TABLET ORAL DAILY
Status: DISCONTINUED | OUTPATIENT
Start: 2024-11-19 | End: 2024-11-21 | Stop reason: HOSPADM

## 2024-11-19 RX ORDER — SODIUM CHLORIDE 9 MG/ML
INJECTION, SOLUTION INTRAVENOUS PRN
Status: DISCONTINUED | OUTPATIENT
Start: 2024-11-19 | End: 2024-11-21 | Stop reason: HOSPADM

## 2024-11-19 RX ORDER — POTASSIUM CHLORIDE 7.45 MG/ML
10 INJECTION INTRAVENOUS PRN
Status: DISCONTINUED | OUTPATIENT
Start: 2024-11-19 | End: 2024-11-21 | Stop reason: HOSPADM

## 2024-11-19 RX ORDER — SODIUM CHLORIDE 0.9 % (FLUSH) 0.9 %
5-40 SYRINGE (ML) INJECTION EVERY 12 HOURS SCHEDULED
Status: DISCONTINUED | OUTPATIENT
Start: 2024-11-19 | End: 2024-11-21 | Stop reason: HOSPADM

## 2024-11-19 RX ORDER — ONDANSETRON 4 MG/1
4 TABLET, ORALLY DISINTEGRATING ORAL EVERY 8 HOURS PRN
Status: DISCONTINUED | OUTPATIENT
Start: 2024-11-19 | End: 2024-11-21 | Stop reason: HOSPADM

## 2024-11-19 RX ORDER — IOPAMIDOL 755 MG/ML
75 INJECTION, SOLUTION INTRAVASCULAR
Status: CANCELLED | OUTPATIENT
Start: 2024-11-19

## 2024-11-19 RX ORDER — LABETALOL 100 MG/1
100 TABLET, FILM COATED ORAL 2 TIMES DAILY
Status: DISCONTINUED | OUTPATIENT
Start: 2024-11-19 | End: 2024-11-19 | Stop reason: SDUPTHER

## 2024-11-19 RX ORDER — SODIUM CHLORIDE 0.9 % (FLUSH) 0.9 %
5-40 SYRINGE (ML) INJECTION PRN
Status: DISCONTINUED | OUTPATIENT
Start: 2024-11-19 | End: 2024-11-21 | Stop reason: HOSPADM

## 2024-11-19 RX ORDER — ASPIRIN 81 MG/1
81 TABLET ORAL DAILY
Status: DISCONTINUED | OUTPATIENT
Start: 2024-11-20 | End: 2024-11-21 | Stop reason: HOSPADM

## 2024-11-19 RX ORDER — ATORVASTATIN CALCIUM 40 MG/1
40 TABLET, FILM COATED ORAL DAILY
COMMUNITY

## 2024-11-19 RX ORDER — LABETALOL 100 MG/1
50 TABLET, FILM COATED ORAL EVERY 12 HOURS SCHEDULED
Status: DISCONTINUED | OUTPATIENT
Start: 2024-11-19 | End: 2024-11-21 | Stop reason: HOSPADM

## 2024-11-19 RX ORDER — 0.9 % SODIUM CHLORIDE 0.9 %
500 INTRAVENOUS SOLUTION INTRAVENOUS ONCE
Status: COMPLETED | OUTPATIENT
Start: 2024-11-19 | End: 2024-11-19

## 2024-11-19 RX ORDER — FINASTERIDE 5 MG/1
5 TABLET, FILM COATED ORAL DAILY
Status: DISCONTINUED | OUTPATIENT
Start: 2024-11-19 | End: 2024-11-21 | Stop reason: HOSPADM

## 2024-11-19 RX ORDER — MAGNESIUM SULFATE IN WATER 40 MG/ML
2000 INJECTION, SOLUTION INTRAVENOUS PRN
Status: DISCONTINUED | OUTPATIENT
Start: 2024-11-19 | End: 2024-11-21 | Stop reason: HOSPADM

## 2024-11-19 RX ORDER — MORPHINE SULFATE 4 MG/ML
4 INJECTION, SOLUTION INTRAMUSCULAR; INTRAVENOUS
Status: DISCONTINUED | OUTPATIENT
Start: 2024-11-19 | End: 2024-11-21 | Stop reason: HOSPADM

## 2024-11-19 RX ORDER — ENOXAPARIN SODIUM 100 MG/ML
40 INJECTION SUBCUTANEOUS DAILY
Status: DISCONTINUED | OUTPATIENT
Start: 2024-11-19 | End: 2024-11-21 | Stop reason: HOSPADM

## 2024-11-19 RX ADMIN — PIPERACILLIN AND TAZOBACTAM 4500 MG: 4; .5 INJECTION, POWDER, LYOPHILIZED, FOR SOLUTION INTRAVENOUS at 14:22

## 2024-11-19 RX ADMIN — LABETALOL HYDROCHLORIDE 50 MG: 100 TABLET, FILM COATED ORAL at 20:46

## 2024-11-19 RX ADMIN — SODIUM CHLORIDE 500 ML: 9 INJECTION, SOLUTION INTRAVENOUS at 11:03

## 2024-11-19 RX ADMIN — DEXTROSE AND SODIUM CHLORIDE: 5; 900 INJECTION, SOLUTION INTRAVENOUS at 14:21

## 2024-11-19 RX ADMIN — AMLODIPINE BESYLATE 5 MG: 5 TABLET ORAL at 14:23

## 2024-11-19 RX ADMIN — ENOXAPARIN SODIUM 40 MG: 100 INJECTION SUBCUTANEOUS at 14:23

## 2024-11-19 RX ADMIN — FINASTERIDE 5 MG: 5 TABLET, FILM COATED ORAL at 14:23

## 2024-11-19 RX ADMIN — DEXTROSE AND SODIUM CHLORIDE: 5; 900 INJECTION, SOLUTION INTRAVENOUS at 20:49

## 2024-11-19 RX ADMIN — IOPAMIDOL 75 ML: 755 INJECTION, SOLUTION INTRAVENOUS at 10:44

## 2024-11-19 RX ADMIN — PIPERACILLIN AND TAZOBACTAM 3375 MG: 3; .375 INJECTION, POWDER, LYOPHILIZED, FOR SOLUTION INTRAVENOUS at 20:50

## 2024-11-19 ASSESSMENT — PAIN DESCRIPTION - DESCRIPTORS
DESCRIPTORS: ACHING
DESCRIPTORS: ACHING

## 2024-11-19 ASSESSMENT — PAIN DESCRIPTION - ONSET: ONSET: SUDDEN

## 2024-11-19 ASSESSMENT — ENCOUNTER SYMPTOMS
SHORTNESS OF BREATH: 0
RHINORRHEA: 0
SORE THROAT: 0
EYE REDNESS: 0
ABDOMINAL PAIN: 1
NAUSEA: 1
VOMITING: 1

## 2024-11-19 ASSESSMENT — PAIN SCALES - GENERAL
PAINLEVEL_OUTOF10: 2
PAINLEVEL_OUTOF10: 2

## 2024-11-19 ASSESSMENT — PAIN DESCRIPTION - ORIENTATION: ORIENTATION: LEFT;LOWER

## 2024-11-19 ASSESSMENT — PAIN DESCRIPTION - LOCATION
LOCATION: ABDOMEN
LOCATION: ABDOMEN

## 2024-11-19 ASSESSMENT — PAIN DESCRIPTION - FREQUENCY: FREQUENCY: INTERMITTENT

## 2024-11-19 ASSESSMENT — PAIN DESCRIPTION - PAIN TYPE: TYPE: ACUTE PAIN

## 2024-11-19 ASSESSMENT — PAIN - FUNCTIONAL ASSESSMENT: PAIN_FUNCTIONAL_ASSESSMENT: 0-10

## 2024-11-19 NOTE — CARE COORDINATION
Met with pt and spouse at bedside. Explained cm role. Pt from home with spouse. Pt IPTA, Plan is home with spouse at d/c. Spouse can transport. No d/c needs at this time.  Review of chart for any potential discharge needs.   MD and bedside RN, if needs arise please consult case management for discharge intervention.  CM will follow as needed

## 2024-11-19 NOTE — ED PROVIDER NOTES
1034 Anion Gap: 11 [RB]   1034 Glucose(!): 114 [RB]   1034 BUN,BUNPL: 16 [RB]   1034 Creatinine: 0.8 [RB]   1034 Alkaline Phosphatase: 104 [RB]   1034 ALT: 27 [RB]   1034 AST: 23 [RB]   1034 Lipase: 14 [RB]      ED Course User Index  [RB] Jamie Aparicio MD       Is this patient to be included in the SEP-1 Core Measure due to severe sepsis or septic shock?   No   Exclusion criteria - the patient is NOT to be included for SEP-1 Core Measure due to:  2+ SIRS criteria are not met       CC/HPI Summary, DDx, ED Course, and Reassessment: DDX: Pancreatitis, gastritis, duodenitis, colitis, dehydration, atypical presentation of myocardial ischemia, other    EKG was unchanged from his EKG in September. Troponin is normal.  His lipase is normal.  CMP shows a glucose of 114.  LFTs normal.  CBC shows white count of 20 with a left shift.  H&H is decreased at 13.3 and 39.2.  Platelets are normal.  CT scan shows acute pancreatitis.  There is some hypoenhancement within the head of the pancreas possibly reflecting early pancreatic necrosis.  There is also some other areas suspicious for forming pseudocyst.    While the patient's abdominal pain feels little bit better today still has some nausea and does not want to eat because he is afraid his pain will come back much worse.  Given his white count of 20 and his CT findings I think the patient would benefit from admission to hold him n.p.o. and then slowly advance his diet.  I have discussed this with Dr. Gomez of GI and he is in agreement.  Of consult the hospitalist to accept this patient to their service.           Patient was given the following medications:   Medications   sodium chloride 0.9 % bolus 500 mL (0 mLs IntraVENous Stopped 11/19/24 1135)   iopamidol (ISOVUE-370) 76 % injection 75 mL (75 mLs IntraVENous Given 11/19/24 1044)        CONSULTS: (Who and What was discussed)  None    Discussion with Other Profesionals : Admitting Team and

## 2024-11-19 NOTE — H&P
History and Physical      Name:  Edward Barlow /Age/Sex: 1950  (74 y.o. male)   MRN & CSN:  [id number] & [id number] Admission Date/Time: 2024  9:47 AM   Location:   PCP: Ellis Ordonez MD       Hospital Day: 1    Assessment and Plan:   Edward Barlow is a 74 y.o.  male with past medical history of hypertension, hyperlipidemia, BPH, celiac disease, eustachian tube dysfunction, history of acute pancreatitis in September this year, came to the emergency room today for evaluation of abdominal pain since past 3 days.  Reports associated nausea/vomiting.  Subsequent to his episode of acute pancreatitis in 2024, patient was followed by GI as outpatient and underwent EUS/ERCP, noted as below.  Denies any fever, chills, chest pain, shortness of breath, diarrhea or constipation.  Upon arrival in the emergency room, WBC noted to be elevated at 20.1, hemoglobin/hematocrit 13.3/39.2, platelet 379.  Lipase normal at 14.  CT abdomen/pelvis noted as below.    CT abdomen/pelvis with IV contrast 2024  1. Findings compatible with acute pancreatitis. There are foci of subtle hypoenhancement within the head of the pancreas, possibly reflecting early pancreatic necrosis.  2. In addition, there are numerous foci of what appears to be organizing fluid collections adjacent to the pancreas as described above, favored to reflect developing pseudocysts. The most well-defined and organized of these is along the inferior margin of the pancreas and measures 5.5 x 2.8 cm.    Upper EUS 10/23/2024  Significant for an irregular heterogeneous mass was identified endosonographically in the lower third of the main bile duct.  The mass measures 7.4 mm x 7.2mm in maximal cross-sectional diameter.  Fine-needle biopsy was performed.  There was a lymph node at the celiac axis.  Fine-needle biopsy was performed.  There was no sign of significant pathology in the pancreatic head, genu of the pancreas, pancreatic body and

## 2024-11-19 NOTE — PLAN OF CARE
Problem: Pain  Goal: Verbalizes/displays adequate comfort level or baseline comfort level  Outcome: Progressing   Patient displays adequate comfort level with prn pain medication

## 2024-11-19 NOTE — ED NOTES
medications for this encounter.     Current Outpatient Medications   Medication Sig Dispense Refill    Coenzyme Q10 (CO Q 10) 10 MG CAPS Take by mouth      amLODIPine (NORVASC) 5 MG tablet Take 1 tablet by mouth daily      labetalol (NORMODYNE) 100 MG tablet TAKE 1 TABLET TWICE A  tablet 2    atorvastatin (LIPITOR) 10 MG tablet TAKE 1 TABLET DAILY (Patient taking differently: 4 tablets) 90 tablet 2    finasteride (PROSCAR) 5 MG tablet TAKE 1 TABLET DAILY 90 tablet 2    triamcinolone (ARISTOCORT) 0.5 % cream Apply topically 3 times daily. 4 Tube 6    aspirin 81 MG EC tablet Take 1 tablet by mouth daily      Multiple Vitamins-Minerals (THERAPEUTIC MULTIVITAMIN-MINERALS) tablet Take 1 tablet by mouth daily      Omega-3 Fatty Acids (FISH OIL) 1000 MG capsule Take by mouth daily      vitamin E 400 UNIT capsule Take 1 capsule by mouth daily      vitamin C (ASCORBIC ACID) 500 MG tablet Take 1 tablet by mouth daily      cholecalciferol (VITAMIN D3) 400 UNIT TABS tablet Take 1 tablet by mouth daily      ondansetron (ZOFRAN) 4 MG tablet Take 1 tablet by mouth 3 times daily as needed for Nausea or Vomiting (Patient not taking: Reported on 10/9/2024) 15 tablet 0    azelastine (ASTELIN) 0.1 % nasal spray 2 sprays by Nasal route 2 times daily Use in each nostril as directed (Patient not taking: Reported on 10/9/2024) 30 mL 3          You may also review the ED PT Care Timeline found under the Summary Nursing Index tab.    Recommendation    Additional Comments: Pt alert and oriented x4, ambulated without difficulty in department      Room assignment:   Inpatient nurse:   Inpatient nurse phone number:     Emergency department Nurse: AMANDA Youngblood   Nurse call back #: [phone]      Electronically signed by: Electronically signed by Nafisa Crisostomo RN on 11/19/2024 at 1:08 PMNurse Handoff Report

## 2024-11-20 ENCOUNTER — ANESTHESIA (OUTPATIENT)
Age: 74
DRG: 439 | End: 2024-11-20
Payer: MEDICARE

## 2024-11-20 LAB
ALBUMIN SERPL-MCNC: 3 G/DL (ref 3.4–5)
ALBUMIN/GLOB SERPL: 0.9 {RATIO}
ALP SERPL-CCNC: 90 U/L (ref 40–129)
ALT SERPL-CCNC: 21 U/L (ref 10–40)
ANION GAP SERPL CALCULATED.3IONS-SCNC: 9 MMOL/L (ref 3–16)
AST SERPL-CCNC: 23 U/L (ref 15–37)
BASOPHILS # BLD: 0.01 K/UL (ref 0–0.2)
BASOPHILS NFR BLD: 0 %
BILIRUB SERPL-MCNC: 0.4 MG/DL (ref 0–1)
BUN SERPL-MCNC: 10 MG/DL (ref 7–20)
CALCIUM SERPL-MCNC: 9 MG/DL (ref 8.3–10.6)
CHLORIDE SERPL-SCNC: 107 MMOL/L (ref 99–110)
CO2 SERPL-SCNC: 24 MMOL/L (ref 21–32)
CREAT SERPL-MCNC: 0.8 MG/DL (ref 0.8–1.3)
EOSINOPHIL # BLD: 0.12 K/UL (ref 0–0.6)
EOSINOPHILS RELATIVE PERCENT: 1 %
ERYTHROCYTE [DISTWIDTH] IN BLOOD BY AUTOMATED COUNT: 12.8 % (ref 12.4–15.4)
GFR, ESTIMATED: >90 ML/MIN/1.73M2
GLUCOSE SERPL-MCNC: 167 MG/DL (ref 70–99)
HCT VFR BLD AUTO: 36.2 % (ref 40.5–52.5)
HGB BLD-MCNC: 12.3 G/DL (ref 13.5–17.5)
IMM GRANULOCYTES # BLD AUTO: 0.09 K/UL (ref 0–0.5)
IMM GRANULOCYTES NFR BLD: 1 %
LYMPHOCYTES NFR BLD: 1.74 K/UL (ref 1–5.1)
LYMPHOCYTES RELATIVE PERCENT: 10 %
MCH RBC QN AUTO: 31 PG (ref 26–34)
MCHC RBC AUTO-ENTMCNC: 34 G/DL (ref 31–36)
MCV RBC AUTO: 91.2 FL (ref 80–100)
MONOCYTES NFR BLD: 0.89 K/UL (ref 0–1.3)
MONOCYTES NFR BLD: 5 %
NEUTROPHILS NFR BLD: 83 %
NEUTS SEG NFR BLD: 14.29 K/UL (ref 1.7–7.7)
PLATELET # BLD AUTO: 345 K/UL (ref 135–450)
PMV BLD AUTO: 9.1 FL
POTASSIUM SERPL-SCNC: 3.9 MMOL/L (ref 3.5–5.1)
PROT SERPL-MCNC: 6.2 G/DL (ref 6.4–8.2)
RBC # BLD AUTO: 3.97 M/UL (ref 4.2–5.9)
SODIUM SERPL-SCNC: 139 MMOL/L (ref 136–145)
WBC OTHER # BLD: 17.1 K/UL (ref 4–11)

## 2024-11-20 PROCEDURE — 3609013800 HC EGD SUBMUCOSAL/BOTOX INJECTION: Performed by: INTERNAL MEDICINE

## 2024-11-20 PROCEDURE — 7100000000 HC PACU RECOVERY - FIRST 15 MIN: Performed by: INTERNAL MEDICINE

## 2024-11-20 PROCEDURE — 2580000003 HC RX 258: Performed by: INTERNAL MEDICINE

## 2024-11-20 PROCEDURE — 2580000003 HC RX 258: Performed by: NURSE ANESTHETIST, CERTIFIED REGISTERED

## 2024-11-20 PROCEDURE — 7100000001 HC PACU RECOVERY - ADDTL 15 MIN: Performed by: INTERNAL MEDICINE

## 2024-11-20 PROCEDURE — 2060000000 HC ICU INTERMEDIATE R&B

## 2024-11-20 PROCEDURE — 88342 IMHCHEM/IMCYTCHM 1ST ANTB: CPT

## 2024-11-20 PROCEDURE — 85025 COMPLETE CBC W/AUTO DIFF WBC: CPT

## 2024-11-20 PROCEDURE — 0DB98ZX EXCISION OF DUODENUM, VIA NATURAL OR ARTIFICIAL OPENING ENDOSCOPIC, DIAGNOSTIC: ICD-10-PCS | Performed by: INTERNAL MEDICINE

## 2024-11-20 PROCEDURE — 6360000002 HC RX W HCPCS: Performed by: NURSE ANESTHETIST, CERTIFIED REGISTERED

## 2024-11-20 PROCEDURE — 2500000003 HC RX 250 WO HCPCS: Performed by: NURSE ANESTHETIST, CERTIFIED REGISTERED

## 2024-11-20 PROCEDURE — C1753 CATH, INTRAVAS ULTRASOUND: HCPCS | Performed by: INTERNAL MEDICINE

## 2024-11-20 PROCEDURE — 88305 TISSUE EXAM BY PATHOLOGIST: CPT

## 2024-11-20 PROCEDURE — 36415 COLL VENOUS BLD VENIPUNCTURE: CPT

## 2024-11-20 PROCEDURE — 6370000000 HC RX 637 (ALT 250 FOR IP): Performed by: INTERNAL MEDICINE

## 2024-11-20 PROCEDURE — 6360000002 HC RX W HCPCS: Performed by: INTERNAL MEDICINE

## 2024-11-20 PROCEDURE — 2709999900 HC NON-CHARGEABLE SUPPLY: Performed by: INTERNAL MEDICINE

## 2024-11-20 PROCEDURE — 80053 COMPREHEN METABOLIC PANEL: CPT

## 2024-11-20 PROCEDURE — 3700000000 HC ANESTHESIA ATTENDED CARE: Performed by: INTERNAL MEDICINE

## 2024-11-20 PROCEDURE — 3700000001 HC ADD 15 MINUTES (ANESTHESIA): Performed by: INTERNAL MEDICINE

## 2024-11-20 RX ORDER — ONDANSETRON 2 MG/ML
INJECTION INTRAMUSCULAR; INTRAVENOUS
Status: DISCONTINUED | OUTPATIENT
Start: 2024-11-20 | End: 2024-11-20 | Stop reason: SDUPTHER

## 2024-11-20 RX ORDER — LABETALOL 100 MG/1
50 TABLET, FILM COATED ORAL 2 TIMES DAILY
COMMUNITY

## 2024-11-20 RX ORDER — PANTOPRAZOLE SODIUM 40 MG/10ML
40 INJECTION, POWDER, LYOPHILIZED, FOR SOLUTION INTRAVENOUS 2 TIMES DAILY
Status: DISCONTINUED | OUTPATIENT
Start: 2024-11-20 | End: 2024-11-21 | Stop reason: HOSPADM

## 2024-11-20 RX ORDER — SODIUM CHLORIDE 0.9 % (FLUSH) 0.9 %
5-40 SYRINGE (ML) INJECTION EVERY 12 HOURS SCHEDULED
Status: CANCELLED | OUTPATIENT
Start: 2024-11-20

## 2024-11-20 RX ORDER — IOPAMIDOL 755 MG/ML
100 INJECTION, SOLUTION INTRAVASCULAR ONCE
Status: DISCONTINUED | OUTPATIENT
Start: 2024-11-20 | End: 2024-11-20 | Stop reason: HOSPADM

## 2024-11-20 RX ORDER — LIDOCAINE HYDROCHLORIDE 20 MG/ML
INJECTION, SOLUTION INTRAVENOUS
Status: DISCONTINUED | OUTPATIENT
Start: 2024-11-20 | End: 2024-11-20 | Stop reason: SDUPTHER

## 2024-11-20 RX ORDER — IOPAMIDOL 755 MG/ML
INJECTION, SOLUTION INTRAVASCULAR
Status: DISCONTINUED
Start: 2024-11-20 | End: 2024-11-20 | Stop reason: WASHOUT

## 2024-11-20 RX ORDER — EPINEPHRINE 1 MG/ML(1)
0.2 AMPUL (ML) INJECTION ONCE
Status: COMPLETED | OUTPATIENT
Start: 2024-11-20 | End: 2024-11-20

## 2024-11-20 RX ORDER — DEXAMETHASONE SODIUM PHOSPHATE 4 MG/ML
INJECTION, SOLUTION INTRA-ARTICULAR; INTRALESIONAL; INTRAMUSCULAR; INTRAVENOUS; SOFT TISSUE
Status: DISCONTINUED | OUTPATIENT
Start: 2024-11-20 | End: 2024-11-20 | Stop reason: SDUPTHER

## 2024-11-20 RX ORDER — INDOMETHACIN 100 MG
100 SUPPOSITORY, RECTAL RECTAL ONCE
Status: DISCONTINUED | OUTPATIENT
Start: 2024-11-20 | End: 2024-11-20 | Stop reason: HOSPADM

## 2024-11-20 RX ORDER — PROPOFOL 10 MG/ML
INJECTION, EMULSION INTRAVENOUS
Status: DISCONTINUED | OUTPATIENT
Start: 2024-11-20 | End: 2024-11-20 | Stop reason: SDUPTHER

## 2024-11-20 RX ORDER — SODIUM CHLORIDE 0.9 % (FLUSH) 0.9 %
5-40 SYRINGE (ML) INJECTION PRN
Status: CANCELLED | OUTPATIENT
Start: 2024-11-20

## 2024-11-20 RX ORDER — ROCURONIUM BROMIDE 10 MG/ML
INJECTION, SOLUTION INTRAVENOUS
Status: DISCONTINUED | OUTPATIENT
Start: 2024-11-20 | End: 2024-11-20 | Stop reason: SDUPTHER

## 2024-11-20 RX ORDER — ONDANSETRON 2 MG/ML
4 INJECTION INTRAMUSCULAR; INTRAVENOUS
Status: CANCELLED | OUTPATIENT
Start: 2024-11-20 | End: 2024-11-21

## 2024-11-20 RX ORDER — NALOXONE HYDROCHLORIDE 0.4 MG/ML
INJECTION, SOLUTION INTRAMUSCULAR; INTRAVENOUS; SUBCUTANEOUS PRN
Status: CANCELLED | OUTPATIENT
Start: 2024-11-20

## 2024-11-20 RX ORDER — SUCCINYLCHOLINE/SOD CL,ISO/PF 200MG/10ML
SYRINGE (ML) INTRAVENOUS
Status: DISCONTINUED | OUTPATIENT
Start: 2024-11-20 | End: 2024-11-20 | Stop reason: SDUPTHER

## 2024-11-20 RX ORDER — DIPHENHYDRAMINE HYDROCHLORIDE 50 MG/ML
12.5 INJECTION INTRAMUSCULAR; INTRAVENOUS
Status: CANCELLED | OUTPATIENT
Start: 2024-11-20 | End: 2024-11-21

## 2024-11-20 RX ORDER — SODIUM CHLORIDE 9 MG/ML
INJECTION, SOLUTION INTRAVENOUS PRN
Status: CANCELLED | OUTPATIENT
Start: 2024-11-20

## 2024-11-20 RX ORDER — SODIUM CHLORIDE 9 MG/ML
INJECTION, SOLUTION INTRAVENOUS
Status: DISCONTINUED | OUTPATIENT
Start: 2024-11-20 | End: 2024-11-20 | Stop reason: SDUPTHER

## 2024-11-20 RX ADMIN — Medication 10 ML: at 20:09

## 2024-11-20 RX ADMIN — LABETALOL HYDROCHLORIDE 50 MG: 100 TABLET, FILM COATED ORAL at 12:43

## 2024-11-20 RX ADMIN — PIPERACILLIN AND TAZOBACTAM 3375 MG: 3; .375 INJECTION, POWDER, LYOPHILIZED, FOR SOLUTION INTRAVENOUS at 12:50

## 2024-11-20 RX ADMIN — LIDOCAINE HYDROCHLORIDE 80 MG: 20 INJECTION, SOLUTION INTRAVENOUS at 10:35

## 2024-11-20 RX ADMIN — Medication 160 MG: at 10:43

## 2024-11-20 RX ADMIN — SODIUM CHLORIDE: 9 INJECTION, SOLUTION INTRAVENOUS at 10:49

## 2024-11-20 RX ADMIN — FINASTERIDE 5 MG: 5 TABLET, FILM COATED ORAL at 12:44

## 2024-11-20 RX ADMIN — PANTOPRAZOLE SODIUM 40 MG: 40 INJECTION, POWDER, FOR SOLUTION INTRAVENOUS at 20:08

## 2024-11-20 RX ADMIN — ENOXAPARIN SODIUM 40 MG: 100 INJECTION SUBCUTANEOUS at 12:47

## 2024-11-20 RX ADMIN — DEXTROSE AND SODIUM CHLORIDE: 5; 900 INJECTION, SOLUTION INTRAVENOUS at 03:56

## 2024-11-20 RX ADMIN — DEXAMETHASONE SODIUM PHOSPHATE 8 MG: 4 INJECTION, SOLUTION INTRAMUSCULAR; INTRAVENOUS at 10:49

## 2024-11-20 RX ADMIN — PANTOPRAZOLE SODIUM 40 MG: 40 INJECTION, POWDER, FOR SOLUTION INTRAVENOUS at 12:35

## 2024-11-20 RX ADMIN — SUGAMMADEX 200 MG: 100 INJECTION, SOLUTION INTRAVENOUS at 11:13

## 2024-11-20 RX ADMIN — LABETALOL HYDROCHLORIDE 50 MG: 100 TABLET, FILM COATED ORAL at 20:08

## 2024-11-20 RX ADMIN — Medication 10 ML: at 12:51

## 2024-11-20 RX ADMIN — PIPERACILLIN AND TAZOBACTAM 3375 MG: 3; .375 INJECTION, POWDER, LYOPHILIZED, FOR SOLUTION INTRAVENOUS at 03:57

## 2024-11-20 RX ADMIN — DEXTROSE AND SODIUM CHLORIDE: 5; 900 INJECTION, SOLUTION INTRAVENOUS at 17:51

## 2024-11-20 RX ADMIN — ROCURONIUM BROMIDE 20 MG: 10 INJECTION, SOLUTION INTRAVENOUS at 10:46

## 2024-11-20 RX ADMIN — ONDANSETRON 4 MG: 2 INJECTION, SOLUTION INTRAMUSCULAR; INTRAVENOUS at 10:49

## 2024-11-20 RX ADMIN — PIPERACILLIN AND TAZOBACTAM 3375 MG: 3; .375 INJECTION, POWDER, LYOPHILIZED, FOR SOLUTION INTRAVENOUS at 20:16

## 2024-11-20 RX ADMIN — AMLODIPINE BESYLATE 5 MG: 5 TABLET ORAL at 12:45

## 2024-11-20 RX ADMIN — PROPOFOL 180 MCG/KG/MIN: 10 INJECTION, EMULSION INTRAVENOUS at 10:36

## 2024-11-20 RX ADMIN — PROPOFOL 80 MG: 10 INJECTION, EMULSION INTRAVENOUS at 10:35

## 2024-11-20 RX ADMIN — PROPOFOL 100 MG: 10 INJECTION, EMULSION INTRAVENOUS at 10:43

## 2024-11-20 RX ADMIN — ASPIRIN 81 MG: 81 TABLET, COATED ORAL at 12:43

## 2024-11-20 RX ADMIN — EPINEPHRINE 0.15 MG: 0.1 INJECTION INTRAVENOUS at 11:16

## 2024-11-20 ASSESSMENT — PAIN DESCRIPTION - FREQUENCY: FREQUENCY: INTERMITTENT

## 2024-11-20 ASSESSMENT — PAIN DESCRIPTION - DESCRIPTORS
DESCRIPTORS: ACHING
DESCRIPTORS: ACHING

## 2024-11-20 ASSESSMENT — PAIN DESCRIPTION - PAIN TYPE: TYPE: ACUTE PAIN

## 2024-11-20 ASSESSMENT — PAIN - FUNCTIONAL ASSESSMENT
PAIN_FUNCTIONAL_ASSESSMENT: ACTIVITIES ARE NOT PREVENTED
PAIN_FUNCTIONAL_ASSESSMENT: 0-10

## 2024-11-20 ASSESSMENT — PAIN SCALES - GENERAL
PAINLEVEL_OUTOF10: 2
PAINLEVEL_OUTOF10: 0

## 2024-11-20 ASSESSMENT — LIFESTYLE VARIABLES: SMOKING_STATUS: 0

## 2024-11-20 ASSESSMENT — PAIN DESCRIPTION - ORIENTATION: ORIENTATION: LEFT;RIGHT

## 2024-11-20 ASSESSMENT — PAIN DESCRIPTION - LOCATION: LOCATION: ABDOMEN

## 2024-11-20 NOTE — H&P
Gastroenterology Note             Pre-operative History and Physical    Patient: Edward Barlow  : 1950  CSN:     History Obtained From:  patient and/or guardian.     HISTORY OF PRESENT ILLNESS:    The patient is a 74 y.o. male  here for endoscopic ultrasound possible ERCP  Patient came in several weeks ago with a bout of pancreatitis we then did an EUS we found 2 irregular areas that we biopsied and they did not show can    He was actually scheduled for repeat ERCP EUS today    He called the office on Monday and we directed him to the emergency room because he was having severe pain he came in on Tuesday and was found to have liver enzymes normal pancreatic enzymes normal but what appears by CT scan to have a significant pancreatitis    Were not sure what this pancreatitis is from we know that there was 1 really irregular area on the what we thought was the possible bile duct tumor so we are going to relook by EUS today because he is having some problems with by CT scan and pain pancreatitis work to try to hold off on the ERCP until a later date and time unless we find something that we really need to do the ERCP for today    Past Medical History:    Past Medical History:   Diagnosis Date    Bladder cancer (HCC) 2014    Celiac disease     Hyperlipidemia     Hypertension     TIA (transient ischemic attack)      Past Surgical History:    Past Surgical History:   Procedure Laterality Date    ERCP N/A 10/23/2024    ENDOSCOPIC RETROGRADE CHOLANGIOPANCREATOGRAPHY SPHINCTER/PAPILLOTOMY performed by Tom Dias MD at [facility] ENDOSCOPY    ERCP N/A 10/23/2024    ENDOSCOPIC RETROGRADE CHOLANGIOPANCREATOGRAPHY STONE REMOVAL performed by Tom Dias MD at [facility] ENDOSCOPY    ERCP N/A 10/23/2024    ENDOSCOPIC RETROGRADE CHOLANGIOPANCREATOGRAPHY STENT INSERTION performed by Tom Dias MD at [facility] ENDOSCOPY    MASTECTOMY      double    TUMOR REMOVAL  2014    positive bladder tumor    UPPER GASTROINTESTINAL

## 2024-11-20 NOTE — FLOWSHEET NOTE
11/19/24 2121   Assessment   Charting Type Shift assessment   Psychosocial   Psychosocial (WDL) WDL   Neurological   Neuro (WDL) WDL   Level of Consciousness 0   [place] Coma Scale   Eye Opening 4   Best Verbal Response 5   Best Motor Response 6   [place] Coma Scale Score 15   Respiratory   Respiratory (WDL) WDL   Respiratory Pattern Regular   Respiratory Depth Normal   Respiratory Quality/Effort Unlabored   Chest Assessment Chest expansion symmetrical   L Breath Sounds Clear   R Breath Sounds Clear   Cardiac   Cardiac (WDL) WDL   Cardiac Rhythm Sinus rhythm   Cardiac Monitor   Alarm Audible Yes   Telemetry Box Number [id number]   Telemetry Monitor Alarm Parameters    Gastrointestinal   Abdominal (WDL) X   Abdomen Inspection Flat   RUQ Bowel Sounds Active   LUQ Bowel Sounds Active   RLQ Bowel Sounds Active   LLQ Bowel Sounds Active   GI Symptoms Nausea   Tenderness Soft;Tenderness   Genitourinary   Genitourinary (WDL) WDL   Peripheral Vascular   Peripheral Vascular (WDL) WDL   Dual Clinician Skin Assessment   Dual Skin Assessment (4 Eyes) WDL   Skin Integumentary    Skin Integumentary (WDL) WDL

## 2024-11-20 NOTE — ANESTHESIA PRE PROCEDURE
Readings from Last 3 Encounters:   11/20/24 72.9 kg (160 lb 12.8 oz)   10/09/24 76.2 kg (168 lb)   09/25/24 78.6 kg (173 lb 4.8 oz)     Body mass index is 23.07 kg/m².    CBC:   Lab Results   Component Value Date/Time    WBC 17.1 11/20/2024 05:27 AM    RBC 3.97 11/20/2024 05:27 AM    HGB 12.3 11/20/2024 05:27 AM    HCT 36.2 11/20/2024 05:27 AM    MCV 91.2 11/20/2024 05:27 AM    RDW 12.8 11/20/2024 05:27 AM     11/20/2024 05:27 AM       CMP:   Lab Results   Component Value Date/Time     11/20/2024 05:27 AM    K 3.9 11/20/2024 05:27 AM    K 3.9 09/25/2024 09:06 AM     11/20/2024 05:27 AM    CO2 24 11/20/2024 05:27 AM    BUN 10 11/20/2024 05:27 AM    CREATININE 0.8 11/20/2024 05:27 AM    GFRAA >60 11/05/2015 04:25 PM    AGRATIO 2.0 09/25/2024 09:06 AM    LABGLOM >90 11/20/2024 05:27 AM    GLUCOSE 167 11/20/2024 05:27 AM    CALCIUM 9.0 11/20/2024 05:27 AM    BILITOT 0.4 11/20/2024 05:27 AM    ALKPHOS 90 11/20/2024 05:27 AM    AST 23 11/20/2024 05:27 AM    ALT 21 11/20/2024 05:27 AM       POC Tests: No results for input(s): \"POCGLU\", \"POCNA\", \"POCK\", \"POCCL\", \"POCBUN\", \"POCHEMO\", \"POCHCT\" in the last 72 hours.    Coags: No results found for: \"PROTIME\", \"INR\", \"APTT\"    HCG (If Applicable): No results found for: \"PREGTESTUR\", \"PREGSERUM\", \"HCG\", \"HCGQUANT\"     ABGs: No results found for: \"PHART\", \"PO2ART\", \"HBB9IMM\", \"CGK8VUO\", \"BEART\", \"W5FIMGEZ\"     Type & Screen (If Applicable):  No results found for: \"ABORH\", \"LABANTI\"    Drug/Infectious Status (If Applicable):  No results found for: \"HIV\", \"HEPCAB\"    COVID-19 Screening (If Applicable): No results found for: \"COVID19\"        Anesthesia Evaluation  Patient summary reviewed  Airway: Mallampati: II  TM distance: >3 FB   Neck ROM: full  Mouth opening: > = 3 FB   Dental: normal exam         Pulmonary:       (-) sleep apnea and not a current smoker                           Cardiovascular:  Exercise tolerance: good (>4 METS)  (+) hypertension:,

## 2024-11-20 NOTE — ANESTHESIA POSTPROCEDURE EVALUATION
Department of Anesthesiology  Postprocedure Note    Patient: Edward Barlow  MRN: [id number]  YOB: 1950  Date of evaluation: 11/20/2024    Procedure Summary       Date: 11/20/24 Room / Location: [place] [address] / [facility]    Anesthesia Start: 1021 Anesthesia Stop: 1122    Procedure: ESOPHAGOGASTRODUODENOSCOPY SUBMUCOSAL INJECTION Diagnosis:       Idiopathic acute pancreatitis without infection or necrosis      (Idiopathic acute pancreatitis without infection or necrosis [K85.00])    Surgeons: Tom Dias MD Responsible Provider: Marcello Sher MD    Anesthesia Type: general, MAC ASA Status: 3            Anesthesia Type: No value filed.    Adrianna Phase I: Adrianna Score: 7    Adrianna Phase II:      Anesthesia Post Evaluation    Patient location during evaluation: PACU  Patient participation: complete - patient participated  Level of consciousness: awake and alert  Pain score: 0  Nausea & Vomiting: no nausea  Cardiovascular status: hemodynamically stable  Respiratory status: acceptable  Hydration status: stable  Pain management: adequate    No notable events documented.

## 2024-11-20 NOTE — FLOWSHEET NOTE
11/19/24 2124   Whiteboard info   Activity In bed   Are you deaf or do you have serious difficulty hearing? No   Are you blind or do you have serious difficulty seeing, even when wearing glasses? No   Patient Goal of the Day(Whiteboard)   Patient Daily Goal reviewed with Patient   Precautions   Precautions None   Negative Pressure Room No   Positive Pressure Room No   Safe Environment   Arm Bands On ID   Patient has limb restriction? No   Overbed Table Within Reach Yes   Safety Measures Bed/Chair alarm on   Video monitor in use No   Fall Risk Interventions   Nursing Judgement-Fall Risk High(Add Comments) No   Toilet Every 2 Hours-In Advance of Need Yes   Hourly Visual Checks Awake;Quiet   Room Door Open Yes   Alarm On Bed   Patient Moved Closer to Nursing Station No   Mobility   Level of Assistance Independent   Assistive Device None   Ambulation Response Tolerated well   Repositioned Turns self   Patient Turned Turns self   Head of Bed Elevated  Self regulated   Heels/Feet Foot of bed elevated   Range of Motion Active;All extremities   Telemetry Details   Telemetry Box Number [id number]   Telemetry Monitor Alarm Parameters    Verbal   Verbal Impairment None

## 2024-11-21 VITALS
HEART RATE: 63 BPM | TEMPERATURE: 97.5 F | DIASTOLIC BLOOD PRESSURE: 89 MMHG | HEIGHT: 70 IN | OXYGEN SATURATION: 99 % | RESPIRATION RATE: 18 BRPM | WEIGHT: 163 LBS | SYSTOLIC BLOOD PRESSURE: 124 MMHG | BODY MASS INDEX: 23.34 KG/M2

## 2024-11-21 LAB
ALBUMIN SERPL-MCNC: 2.9 G/DL (ref 3.4–5)
ALBUMIN/GLOB SERPL: 0.9 {RATIO}
ALP SERPL-CCNC: 98 U/L (ref 40–129)
ALT SERPL-CCNC: 19 U/L (ref 10–40)
ANION GAP SERPL CALCULATED.3IONS-SCNC: 9 MMOL/L (ref 3–16)
AST SERPL-CCNC: 20 U/L (ref 15–37)
BASOPHILS # BLD: 0.01 K/UL (ref 0–0.2)
BASOPHILS NFR BLD: 0 %
BILIRUB SERPL-MCNC: 0.4 MG/DL (ref 0–1)
BUN SERPL-MCNC: 8 MG/DL (ref 7–20)
CALCIUM SERPL-MCNC: 9.4 MG/DL (ref 8.3–10.6)
CHLORIDE SERPL-SCNC: 109 MMOL/L (ref 99–110)
CO2 SERPL-SCNC: 23 MMOL/L (ref 21–32)
CREAT SERPL-MCNC: 0.7 MG/DL (ref 0.8–1.3)
EOSINOPHIL # BLD: 0 K/UL (ref 0–0.6)
EOSINOPHILS RELATIVE PERCENT: 0 %
ERYTHROCYTE [DISTWIDTH] IN BLOOD BY AUTOMATED COUNT: 12.7 % (ref 12.4–15.4)
GFR, ESTIMATED: >90 ML/MIN/1.73M2
GLUCOSE SERPL-MCNC: 186 MG/DL (ref 70–99)
HCT VFR BLD AUTO: 35.4 % (ref 40.5–52.5)
HGB BLD-MCNC: 11.9 G/DL (ref 13.5–17.5)
IMM GRANULOCYTES # BLD AUTO: 0.03 K/UL (ref 0–0.5)
IMM GRANULOCYTES NFR BLD: 0 %
LYMPHOCYTES NFR BLD: 1.71 K/UL (ref 1–5.1)
LYMPHOCYTES RELATIVE PERCENT: 14 %
MCH RBC QN AUTO: 31 PG (ref 26–34)
MCHC RBC AUTO-ENTMCNC: 33.6 G/DL (ref 31–36)
MCV RBC AUTO: 92.2 FL (ref 80–100)
MONOCYTES NFR BLD: 0.53 K/UL (ref 0–1.3)
MONOCYTES NFR BLD: 4 %
NEUTROPHILS NFR BLD: 82 %
NEUTS SEG NFR BLD: 10.39 K/UL (ref 1.7–7.7)
PLATELET # BLD AUTO: 333 K/UL (ref 135–450)
PMV BLD AUTO: 9.6 FL
POTASSIUM SERPL-SCNC: 4 MMOL/L (ref 3.5–5.1)
PROT SERPL-MCNC: 6.2 G/DL (ref 6.4–8.2)
RBC # BLD AUTO: 3.84 M/UL (ref 4.2–5.9)
SODIUM SERPL-SCNC: 140 MMOL/L (ref 136–145)
SURGICAL PATHOLOGY REPORT: NORMAL
WBC OTHER # BLD: 12.7 K/UL (ref 4–11)

## 2024-11-21 PROCEDURE — 6370000000 HC RX 637 (ALT 250 FOR IP): Performed by: INTERNAL MEDICINE

## 2024-11-21 PROCEDURE — 85025 COMPLETE CBC W/AUTO DIFF WBC: CPT

## 2024-11-21 PROCEDURE — 36415 COLL VENOUS BLD VENIPUNCTURE: CPT

## 2024-11-21 PROCEDURE — 2580000003 HC RX 258: Performed by: INTERNAL MEDICINE

## 2024-11-21 PROCEDURE — 6360000002 HC RX W HCPCS: Performed by: INTERNAL MEDICINE

## 2024-11-21 PROCEDURE — 80053 COMPREHEN METABOLIC PANEL: CPT

## 2024-11-21 RX ORDER — PANTOPRAZOLE SODIUM 40 MG/1
40 TABLET, DELAYED RELEASE ORAL DAILY
Qty: 30 TABLET | Refills: 1 | Status: SHIPPED | OUTPATIENT
Start: 2024-11-21 | End: 2025-01-20

## 2024-11-21 RX ADMIN — ENOXAPARIN SODIUM 40 MG: 100 INJECTION SUBCUTANEOUS at 08:52

## 2024-11-21 RX ADMIN — ASPIRIN 81 MG: 81 TABLET, COATED ORAL at 08:51

## 2024-11-21 RX ADMIN — FINASTERIDE 5 MG: 5 TABLET, FILM COATED ORAL at 08:52

## 2024-11-21 RX ADMIN — PIPERACILLIN AND TAZOBACTAM 3375 MG: 3; .375 INJECTION, POWDER, LYOPHILIZED, FOR SOLUTION INTRAVENOUS at 03:48

## 2024-11-21 RX ADMIN — PANTOPRAZOLE SODIUM 40 MG: 40 INJECTION, POWDER, FOR SOLUTION INTRAVENOUS at 08:59

## 2024-11-21 RX ADMIN — LABETALOL HYDROCHLORIDE 50 MG: 100 TABLET, FILM COATED ORAL at 08:51

## 2024-11-21 RX ADMIN — AMLODIPINE BESYLATE 5 MG: 5 TABLET ORAL at 08:52

## 2024-11-21 ASSESSMENT — PAIN SCALES - GENERAL
PAINLEVEL_OUTOF10: 0

## 2024-11-21 NOTE — DISCHARGE INSTRUCTIONS
Follow up with PCP in 1 week    Follow up with GI in 2-3 weeks.     You are prescription for Protonix which is a proton pump inhibitor for esophagitis found on esophageal ultrasound.  Continue to take daily before breakfast.  Follow-up with GI for continued dosing.

## 2024-11-21 NOTE — DISCHARGE SUMMARY
Hospital Medicine Discharge Summary    Patient ID: Edward Barlow      Patient's PCP: Ellis Ordonez MD    Admit Date: 11/19/2024     Discharge Date:   11/21/2024    Admitting Physician: George Collado MD     Discharge Physician: Ashley Hooker, APRN - CNP     Discharge Diagnoses  Esophagitis without bleeding  Acute pancreatitis improved  Cyst of pancreas  Duodenal ulcer  Hypertension  Hyperlipidemia  BPH      Hospital Course:  74 y.o.  male with past medical history of hypertension, hyperlipidemia, BPH, celiac disease, eustachian tube dysfunction, history of acute pancreatitis in September this year, came to the emergency room today for evaluation of abdominal pain since past 3 days.  Reports associated nausea/vomiting.  Subsequent to his episode of acute pancreatitis in September 2024, patient was followed by GI as outpatient and underwent EUS/ERCP, noted as below.  Denies any fever, chills, chest pain, shortness of breath, diarrhea or constipation.  Upon arrival in the emergency room, WBC noted to be elevated at 20.1, hemoglobin/hematocrit 13.3/39.2, platelet 379.  Lipase normal at 14.  CT abdomen/pelvis noted as below.     CT abdomen/pelvis with IV contrast 11/19/2024  1. Findings compatible with acute pancreatitis. There are foci of subtle hypoenhancement within the head of the pancreas, possibly reflecting early pancreatic necrosis.  2. In addition, there are numerous foci of what appears to be organizing fluid collections adjacent to the pancreas as described above, favored to reflect developing pseudocysts. The most well-defined and organized of these is along the inferior margin of the pancreas and measures 5.5 x 2.8 cm.     Upper EUS 10/23/2024  Significant for an irregular heterogeneous mass was identified endosonographically in the lower third of the main bile duct.  The mass measures 7.4 mm x 7.2mm in maximal cross-sectional diameter.  Fine-needle biopsy was performed.  There was a lymph node at the

## 2024-11-21 NOTE — PLAN OF CARE
Problem: Discharge Planning  Goal: Discharge to home or other facility with appropriate resources  11/20/2024 2215 by Cecilia Gonzales RN  Outcome: Progressing  11/20/2024 1253 by Arlen Kent RN  Outcome: Progressing     Problem: Pain  Goal: Verbalizes/displays adequate comfort level or baseline comfort level  11/20/2024 2215 by Cecilia Gonzales RN  Outcome: Progressing  11/20/2024 1253 by Arlen Kent RN  Outcome: Progressing     Problem: Safety - Adult  Goal: Free from fall injury  11/20/2024 2215 by Cecilia Gonzales RN  Outcome: Progressing  11/20/2024 1253 by Arlen Kent RN  Outcome: Progressing     Problem: Gastrointestinal - Adult  Goal: Minimal or absence of nausea and vomiting  11/20/2024 2215 by Cecilia Gonzales RN  Outcome: Progressing  11/20/2024 1253 by Arlen Kent RN  Outcome: Progressing  Goal: Maintains or returns to baseline bowel function  11/20/2024 2215 by Cecilia Gonzales RN  Outcome: Progressing  11/20/2024 1253 by Arlen Kent, RN  Outcome: Progressing  Goal: Maintains adequate nutritional intake  11/20/2024 2215 by Cecilia Gonzales RN  Outcome: Progressing  11/20/2024 1253 by Arlen Kent, RN  Outcome: Progressing

## 2024-11-21 NOTE — CARE COORDINATION
Discharge Note     Discharge order received. This patient will discharge home with no needs.     Destination: Home     Transportation: Family     All case management needs met.        Comment: Pt d/c home in care of family. Family to transport.

## 2024-11-22 NOTE — PROGRESS NOTES
Hospitalist Progress Note      Name:  Edward Barlow /Age/Sex: 1950  (74 y.o. male)   MRN & CSN:  [id number] & [id number] Admission Date/Time: 2024  9:47 AM   Location:  3217/3217-01 PCP: Ellis Ordonez MD         Hospital Day: 2    Assessment and Plan:   Edward Barlow is a 74 y.o.  male with past medical history of hypertension, hyperlipidemia, BPH, celiac disease, eustachian tube dysfunction, history of acute pancreatitis in September this year, came to the emergency room today for evaluation of abdominal pain since past 3 days.  Reports associated nausea/vomiting.  Subsequent to his episode of acute pancreatitis in 2024, patient was followed by GI as outpatient and underwent EUS/ERCP, noted as below.  Denies any fever, chills, chest pain, shortness of breath, diarrhea or constipation.  Upon arrival in the emergency room, WBC noted to be elevated at 20.1, hemoglobin/hematocrit 13.3/39.2, platelet 379.  Lipase normal at 14.  CT abdomen/pelvis noted as below.     CT abdomen/pelvis with IV contrast 2024  1. Findings compatible with acute pancreatitis. There are foci of subtle hypoenhancement within the head of the pancreas, possibly reflecting early pancreatic necrosis.  2. In addition, there are numerous foci of what appears to be organizing fluid collections adjacent to the pancreas as described above, favored to reflect developing pseudocysts. The most well-defined and organized of these is along the inferior margin of the pancreas and measures 5.5 x 2.8 cm.     Upper EUS 10/23/2024  Significant for an irregular heterogeneous mass was identified endosonographically in the lower third of the main bile duct.  The mass measures 7.4 mm x 7.2mm in maximal cross-sectional diameter.  Fine-needle biopsy was performed.  There was a lymph node at the celiac axis.  Fine-needle biopsy was performed.  There was no sign of significant pathology in the pancreatic head, genu of the pancreas, 
  Physician Progress Note      PATIENT:               TRINA FRANKLIN  CSN #:                  [id number]  :                       1950  ADMIT DATE:       2024 9:47 AM  DISCH DATE:        2024 2:42 PM  RESPONDING  PROVIDER #:        Tom Dias MD          QUERY TEXT:    Pt admitted with acute pancreatitis and noted had recent ERCP done 10/23/24?.   If possible, please document in progress notes and discharge summary the   relationship if any between the pancreatitis and the ERCP:  ?  The medical record reflects the following:  Risk Factors: pancreatitis, prior ERCP    Clinical Indicators: acute pancreatitis  Prior ERCP done 10/23/24  Per EUS  note-\"At this point in time I am were still not exactly sure why   this patient has  developed pancreatitis he does have 24 years of knowing he has celiac   disease...\"    Treatment: GI consult, EUS, labs, imaging, empiric Zosyn,  Options provided:  -- Pancreatitis is due to recent prior ERCP  -- Pancreatitis is not due to the procedure, but is due to other incidental   risk factor, Please specify other incidental risk factor.  -- Other - I will add my own diagnosis  -- Disagree - Not applicable / Not valid  -- Disagree - Clinically unable to determine / Unknown  -- Refer to Clinical Documentation Reviewer    PROVIDER RESPONSE TEXT:    Pancreatitis is not due to the procedure but due to we do not know why, in oct   it look like he had a tumor and we bx and it was not we believe there is   something there and this is NOT related to the the ercp    Query created by: Samanta Leija on 2024 12:10 PM      Electronically signed by:  Tom Dias MD 2024 6:55 AM          
4 Eyes Admission Assessment     I agree as the admission nurse that 2 RN's have performed a thorough Head to Toe Skin Assessment on the patient. ALL assessment sites listed below have been assessed on admission.       Areas assessed by both nurses: Nikky RN  [x]   Head, Face, and Ears   [x]   Shoulders, Back, and Chest  [x]   Arms, Elbows, and Hands   [x]   Coccyx, Sacrum, and Ischium  [x]   Legs, Feet, and Heels        Does the Patient have Skin Breakdown?  No         Robin Prevention initiated:  No   Wound Care Orders initiated:  No      [facility] nurse consulted for Pressure Injury (Stage 3,4, Unstageable, DTI, NWPT, and Complex wounds) or Robin score 18 or lower:  No      Nurse 1 eSignature: Electronically signed by Nikky Lozano RN on 11/19/24 at 1:53 PM EST    **SHARE this note so that the co-signing nurse is able to place an eSignature**    Nurse 2 eSignature: Electronically signed by Leola Ren RN on 11/19/24 at 6:28 PM EST    
[facility]    Pharmacy Extended Infusion Beta-Lactam Adjustment Communication    Piperacillin/Tazobactam ordered for treatment of intra-abdominal infection. Per [facility] Extended Infusion Beta-Lactam Policy, piperacillin/tazobactam will be changed to 4,500 mg IV x1 for 30 minutes, followed by 3,375 mg IV every 8 hours EI.     Estimated Creatinine Clearance: Estimated Creatinine Clearance: 85 mL/min (based on SCr of 0.8 mg/dL).  Dialysis Status, SHAJI, CKD: None  BMI: Body mass index is 23.45 kg/m².    Rationale for Adjustment: Agent is renally eliminated and demonstrates time-dependent effect on bacterial eradication. Extended-infusion dosing strategy aims to enhance microbiologic and clinical efficacy.    Pharmacy will continue to monitor renal function, cultures and sensitivities (where available) and adjust dose as necessary.      Please call with any questions.    Cuco Perkins, Juanis  [place]   [id number]  11/19/2024   1:45 PM      
Called and obtained report from Arlen, patient on schedule for 1030.  
Circulator has verified that procedural consent is correctly filled out prior to the start of the procedure.    
Circulator has verified that procedural consent is correctly filled out prior to the start of the procedure.     1   balloon removed from scope intact post-procedure and was discarded.    
Patient brought to pre op bay 4 for procedure, patient alert and oriented x 4, admission completed, iv patent, vss, spouse at bedside.  
Patient educated on discharge instructions as well as new medications use, dosage, administration and possible side effects.  Patient verified knowledge. IV removed without difficulty and dry dressing in place. Telemetry monitor removed and returned to CMU. Pt left facility in stable condition to Home with all of their personal belongings.   Pt transported via wheelchair with wife.   Maryjane Kirkpatrick RN  
Pt cleared to discharge, wishes to stay until approx 1430 to ensure no digestive issues with lunch. Continuing to monitor.   
Pt received from OR to PACU # 6.     Post: Procedure(s):  ESOPHAGOGASTRODUODENOSCOPY SUBMUCOSAL INJECTION    Report received from CRNA and OR RN.    Pt is not fully wakeful from anesthesia.     Attached to PACU monitoring system. Alarms and parameters set    Denies pain and no complaints of nausea at this time.    
Pt refused bed/chair alarms. Education and 1:1 were provided.  
Spiritual Health History and Assessment/Progress Note  [facility]    Initial Encounter,  ,  ,      Name: Edward Barlow MRN: [id number]    Age: 74 y.o.     Sex: male   Language: English   Bahai: Unknown   Acute necrotizing pancreatitis     Date: 11/20/2024            Total Time Calculated: 1 min              Spiritual Assessment began in [facility] 3 PROGRESSIVE CARE        Referral/Consult From: Rounding   Encounter Overview/Reason: Initial Encounter  Service Provided For: Patient and family together    Marisol, Belief, Meaning:   Patient unable to assess at this time  Family/Friends Other: Offered SHS/no needs at this time      Importance and Influence:  Patient unable to assess at this time  Family/Friends Other: Offered SHS/no needs at this time    Community:  Patient Other: Offered SHS/no needs at this time  Family/Friends Other: Offered SHS/no needs at this time    Assessment and Plan of Care:     Patient Interventions include: Other: Offered SHS/no needs at this time  Family/Friends Interventions include: Other: Offered SHS/no needs at this time    Patient Plan of Care: Other: Offered SHS/no needs at this time  Family/Friends Plan of Care: Other: Offered SHS/no needs at this time    Electronically signed by CELIA Lazo on 11/20/2024 at 4:32 PM   
gallbladder.    Subjective: Patient was evaluated bedside this morning.  Does endorse feeling well denies any abdominal pain this morning.  States he has been tolerating clear liquid diet.  Is asking if his diet is being advanced to full liquid.  Nurses contacted GI on-call for the advancement recommendations.  Right upper extremity is noted to be swollen greater than left, this is a site where his IV as continuous IV fluids at 150 mL/h.  Patient denies any pain of arm.  Will pause IV fluids since advancing to clear liquid diet-will have nursing check IV site, hold fluids, elevate, wrap and warm towels.    Assessment and Recommendations:    Abdominal pain  Acute pancreatitis with concern for early pancreatic necrosis  Pancreatic pseudocyst  CT abdomen pelvis concern for early pancreatic necrosis, leukocytosis on admission  -Inpatient  -GI consult appreciate assistance  -EUS completed 11/20/2024-with GI.  Results as above  -Analgesics antiemetics  -Empiric Zosyn  -advance diet per GI recommendation    Right upper extremity swelling: Concern for IV fluid infiltration, no redness or warmth noted.  -Hold IV fluids  - clear liquid diet started per GI recommendations  -Elevate area wrapped in warm blankets  -1 new IV site obtained resume fluids    Chronic comorbidities continue outpatient regimen unless otherwise contraindicated    Hypertension-monitor blood pressure trends for hypotension  Hyperlipidemia  BPH    Medication list reviewed  Continue aspirin, Norvasc, labetalol, Proscar-see orders    Active Hospital Problems    Diagnosis     Acute necrotizing pancreatitis [K85.91]        Diet ADULT DIET; Clear Liquid   DVT Prophylaxis [x] Lovenox, []  Heparin, [] SCDs, [] Ambulation   GI Prophylaxis [x] PPI,  [] H2 Blocker,  [] Carafate,  [] Diet/Tube Feeds   Code Status Full Code   Disposition Patient requires continued admission due to continued management evaluation   MDM [] Low, [] Moderate,[x]  High  Patient's risk as

## 2024-12-07 ENCOUNTER — HOSPITAL ENCOUNTER (INPATIENT)
Age: 74
LOS: 11 days | Discharge: HOME HEALTH | DRG: 853 | End: 2024-12-18
Attending: EMERGENCY MEDICINE | Admitting: INTERNAL MEDICINE
Payer: MEDICARE

## 2024-12-07 ENCOUNTER — APPOINTMENT (OUTPATIENT)
Age: 74
DRG: 853 | End: 2024-12-07
Payer: MEDICARE

## 2024-12-07 DIAGNOSIS — R10.13 ABDOMINAL PAIN, EPIGASTRIC: ICD-10-CM

## 2024-12-07 DIAGNOSIS — E43 SEVERE MALNUTRITION (HCC): Chronic | ICD-10-CM

## 2024-12-07 DIAGNOSIS — R11.0 NAUSEA: ICD-10-CM

## 2024-12-07 DIAGNOSIS — K31.89 GASTRIC DISTENTION: ICD-10-CM

## 2024-12-07 DIAGNOSIS — K85.02 IDIOPATHIC ACUTE PANCREATITIS WITH INFECTED NECROSIS: Primary | ICD-10-CM

## 2024-12-07 PROBLEM — K85.90 PANCREATITIS, UNSPECIFIED PANCREATITIS TYPE: Status: ACTIVE | Noted: 2024-12-07

## 2024-12-07 LAB
ALBUMIN SERPL-MCNC: 3.1 G/DL (ref 3.4–5)
ALBUMIN/GLOB SERPL: 0.8 {RATIO}
ALP SERPL-CCNC: 93 U/L (ref 40–129)
ALT SERPL-CCNC: 31 U/L (ref 10–40)
ANION GAP SERPL CALCULATED.3IONS-SCNC: 13 MMOL/L (ref 3–16)
AST SERPL-CCNC: 37 U/L (ref 15–37)
BASOPHILS # BLD: 0.02 K/UL (ref 0–0.2)
BASOPHILS NFR BLD: 0 %
BILIRUB SERPL-MCNC: 0.7 MG/DL (ref 0–1)
BUN SERPL-MCNC: 14 MG/DL (ref 7–20)
CALCIUM SERPL-MCNC: 9.7 MG/DL (ref 8.3–10.6)
CHLORIDE SERPL-SCNC: 97 MMOL/L (ref 99–110)
CO2 SERPL-SCNC: 23 MMOL/L (ref 21–32)
CREAT SERPL-MCNC: 0.8 MG/DL (ref 0.8–1.3)
EOSINOPHIL # BLD: 0.01 K/UL (ref 0–0.6)
EOSINOPHILS RELATIVE PERCENT: 0 %
ERYTHROCYTE [DISTWIDTH] IN BLOOD BY AUTOMATED COUNT: 12.8 % (ref 12.4–15.4)
GFR, ESTIMATED: >90 ML/MIN/1.73M2
GLUCOSE SERPL-MCNC: 146 MG/DL (ref 70–99)
HCT VFR BLD AUTO: 38.6 % (ref 40.5–52.5)
HGB BLD-MCNC: 13.1 G/DL (ref 13.5–17.5)
IMM GRANULOCYTES # BLD AUTO: 0.1 K/UL (ref 0–0.5)
IMM GRANULOCYTES NFR BLD: 1 %
LACTATE BLDV-SCNC: 0.8 MMOL/L (ref 0.4–1.9)
LIPASE SERPL-CCNC: 21 U/L (ref 13–60)
LYMPHOCYTES NFR BLD: 1.87 K/UL (ref 1–5.1)
LYMPHOCYTES RELATIVE PERCENT: 10 %
MCH RBC QN AUTO: 30.3 PG (ref 26–34)
MCHC RBC AUTO-ENTMCNC: 33.9 G/DL (ref 31–36)
MCV RBC AUTO: 89.1 FL (ref 80–100)
MONOCYTES NFR BLD: 1.19 K/UL (ref 0–1.3)
MONOCYTES NFR BLD: 6 %
NEUTROPHILS NFR BLD: 84 %
NEUTS SEG NFR BLD: 16.44 K/UL (ref 1.7–7.7)
PLATELET # BLD AUTO: 359 K/UL (ref 135–450)
PMV BLD AUTO: 9.6 FL
POTASSIUM SERPL-SCNC: 4.5 MMOL/L (ref 3.5–5.1)
PROT SERPL-MCNC: 7.3 G/DL (ref 6.4–8.2)
RBC # BLD AUTO: 4.33 M/UL (ref 4.2–5.9)
SODIUM SERPL-SCNC: 133 MMOL/L (ref 136–145)
TROPONIN I SERPL HS-MCNC: 8 NG/L (ref 0–22)
WBC OTHER # BLD: 19.6 K/UL (ref 4–11)

## 2024-12-07 PROCEDURE — 96376 TX/PRO/DX INJ SAME DRUG ADON: CPT

## 2024-12-07 PROCEDURE — 93005 ELECTROCARDIOGRAM TRACING: CPT | Performed by: EMERGENCY MEDICINE

## 2024-12-07 PROCEDURE — 96365 THER/PROPH/DIAG IV INF INIT: CPT

## 2024-12-07 PROCEDURE — 6360000002 HC RX W HCPCS: Performed by: NURSE PRACTITIONER

## 2024-12-07 PROCEDURE — 6360000004 HC RX CONTRAST MEDICATION: Performed by: EMERGENCY MEDICINE

## 2024-12-07 PROCEDURE — 96375 TX/PRO/DX INJ NEW DRUG ADDON: CPT

## 2024-12-07 PROCEDURE — 99285 EMERGENCY DEPT VISIT HI MDM: CPT

## 2024-12-07 PROCEDURE — 85025 COMPLETE CBC W/AUTO DIFF WBC: CPT

## 2024-12-07 PROCEDURE — 2580000003 HC RX 258: Performed by: EMERGENCY MEDICINE

## 2024-12-07 PROCEDURE — 2580000003 HC RX 258: Performed by: NURSE PRACTITIONER

## 2024-12-07 PROCEDURE — 6360000002 HC RX W HCPCS: Performed by: INTERNAL MEDICINE

## 2024-12-07 PROCEDURE — 87040 BLOOD CULTURE FOR BACTERIA: CPT

## 2024-12-07 PROCEDURE — 2000000000 HC ICU R&B

## 2024-12-07 PROCEDURE — 74177 CT ABD & PELVIS W/CONTRAST: CPT

## 2024-12-07 PROCEDURE — 80053 COMPREHEN METABOLIC PANEL: CPT

## 2024-12-07 PROCEDURE — 83605 ASSAY OF LACTIC ACID: CPT

## 2024-12-07 PROCEDURE — 83690 ASSAY OF LIPASE: CPT

## 2024-12-07 PROCEDURE — 84484 ASSAY OF TROPONIN QUANT: CPT

## 2024-12-07 PROCEDURE — 36415 COLL VENOUS BLD VENIPUNCTURE: CPT

## 2024-12-07 PROCEDURE — 6360000002 HC RX W HCPCS: Performed by: EMERGENCY MEDICINE

## 2024-12-07 RX ORDER — 0.9 % SODIUM CHLORIDE 0.9 %
30 INTRAVENOUS SOLUTION INTRAVENOUS ONCE
Status: COMPLETED | OUTPATIENT
Start: 2024-12-07 | End: 2024-12-07

## 2024-12-07 RX ORDER — ENOXAPARIN SODIUM 100 MG/ML
40 INJECTION SUBCUTANEOUS DAILY
Status: DISCONTINUED | OUTPATIENT
Start: 2024-12-07 | End: 2024-12-18 | Stop reason: HOSPADM

## 2024-12-07 RX ORDER — SODIUM CHLORIDE 0.9 % (FLUSH) 0.9 %
5-40 SYRINGE (ML) INJECTION PRN
Status: DISCONTINUED | OUTPATIENT
Start: 2024-12-07 | End: 2024-12-18 | Stop reason: HOSPADM

## 2024-12-07 RX ORDER — ONDANSETRON 4 MG/1
4 TABLET, ORALLY DISINTEGRATING ORAL EVERY 8 HOURS PRN
Status: DISCONTINUED | OUTPATIENT
Start: 2024-12-07 | End: 2024-12-18 | Stop reason: HOSPADM

## 2024-12-07 RX ORDER — SODIUM CHLORIDE 9 MG/ML
INJECTION, SOLUTION INTRAVENOUS CONTINUOUS
Status: ACTIVE | OUTPATIENT
Start: 2024-12-07 | End: 2024-12-08

## 2024-12-07 RX ORDER — BISACODYL 10 MG
10 SUPPOSITORY, RECTAL RECTAL DAILY
Status: DISCONTINUED | OUTPATIENT
Start: 2024-12-07 | End: 2024-12-18

## 2024-12-07 RX ORDER — MORPHINE SULFATE 4 MG/ML
4 INJECTION, SOLUTION INTRAMUSCULAR; INTRAVENOUS ONCE
Status: COMPLETED | OUTPATIENT
Start: 2024-12-07 | End: 2024-12-07

## 2024-12-07 RX ORDER — MORPHINE SULFATE 4 MG/ML
4 INJECTION, SOLUTION INTRAMUSCULAR; INTRAVENOUS
Status: DISCONTINUED | OUTPATIENT
Start: 2024-12-07 | End: 2024-12-07

## 2024-12-07 RX ORDER — SODIUM CHLORIDE 9 MG/ML
INJECTION, SOLUTION INTRAVENOUS PRN
Status: DISCONTINUED | OUTPATIENT
Start: 2024-12-07 | End: 2024-12-18 | Stop reason: HOSPADM

## 2024-12-07 RX ORDER — POLYETHYLENE GLYCOL 3350 17 G/17G
17 POWDER, FOR SOLUTION ORAL DAILY PRN
Status: DISCONTINUED | OUTPATIENT
Start: 2024-12-07 | End: 2024-12-18 | Stop reason: HOSPADM

## 2024-12-07 RX ORDER — IOPAMIDOL 755 MG/ML
75 INJECTION, SOLUTION INTRAVASCULAR
Status: COMPLETED | OUTPATIENT
Start: 2024-12-07 | End: 2024-12-07

## 2024-12-07 RX ORDER — ONDANSETRON 2 MG/ML
4 INJECTION INTRAMUSCULAR; INTRAVENOUS ONCE
Status: COMPLETED | OUTPATIENT
Start: 2024-12-07 | End: 2024-12-07

## 2024-12-07 RX ORDER — POTASSIUM CHLORIDE 1500 MG/1
40 TABLET, EXTENDED RELEASE ORAL PRN
Status: DISCONTINUED | OUTPATIENT
Start: 2024-12-07 | End: 2024-12-18 | Stop reason: HOSPADM

## 2024-12-07 RX ORDER — POTASSIUM CHLORIDE 7.45 MG/ML
10 INJECTION INTRAVENOUS PRN
Status: DISCONTINUED | OUTPATIENT
Start: 2024-12-07 | End: 2024-12-18 | Stop reason: HOSPADM

## 2024-12-07 RX ORDER — ONDANSETRON 2 MG/ML
4 INJECTION INTRAMUSCULAR; INTRAVENOUS EVERY 6 HOURS PRN
Status: DISCONTINUED | OUTPATIENT
Start: 2024-12-07 | End: 2024-12-18 | Stop reason: HOSPADM

## 2024-12-07 RX ORDER — SODIUM CHLORIDE 0.9 % (FLUSH) 0.9 %
5-40 SYRINGE (ML) INJECTION EVERY 12 HOURS SCHEDULED
Status: DISCONTINUED | OUTPATIENT
Start: 2024-12-07 | End: 2024-12-18 | Stop reason: HOSPADM

## 2024-12-07 RX ORDER — ACETAMINOPHEN 325 MG/1
650 TABLET ORAL EVERY 6 HOURS PRN
Status: DISCONTINUED | OUTPATIENT
Start: 2024-12-07 | End: 2024-12-18 | Stop reason: HOSPADM

## 2024-12-07 RX ORDER — MORPHINE SULFATE 2 MG/ML
2 INJECTION, SOLUTION INTRAMUSCULAR; INTRAVENOUS ONCE
Status: COMPLETED | OUTPATIENT
Start: 2024-12-07 | End: 2024-12-07

## 2024-12-07 RX ORDER — MORPHINE SULFATE 2 MG/ML
2 INJECTION, SOLUTION INTRAMUSCULAR; INTRAVENOUS
Status: DISCONTINUED | OUTPATIENT
Start: 2024-12-07 | End: 2024-12-07

## 2024-12-07 RX ORDER — MAGNESIUM SULFATE IN WATER 40 MG/ML
2000 INJECTION, SOLUTION INTRAVENOUS PRN
Status: DISCONTINUED | OUTPATIENT
Start: 2024-12-07 | End: 2024-12-18 | Stop reason: HOSPADM

## 2024-12-07 RX ORDER — ACETAMINOPHEN 650 MG/1
650 SUPPOSITORY RECTAL EVERY 6 HOURS PRN
Status: DISCONTINUED | OUTPATIENT
Start: 2024-12-07 | End: 2024-12-18 | Stop reason: HOSPADM

## 2024-12-07 RX ADMIN — IOPAMIDOL 75 ML: 755 INJECTION, SOLUTION INTRAVENOUS at 09:35

## 2024-12-07 RX ADMIN — MORPHINE SULFATE 4 MG: 4 INJECTION, SOLUTION INTRAMUSCULAR; INTRAVENOUS at 10:14

## 2024-12-07 RX ADMIN — SODIUM CHLORIDE 2112 ML: 9 INJECTION, SOLUTION INTRAVENOUS at 12:06

## 2024-12-07 RX ADMIN — PIPERACILLIN AND TAZOBACTAM 3375 MG: 3; .375 INJECTION, POWDER, LYOPHILIZED, FOR SOLUTION INTRAVENOUS at 17:51

## 2024-12-07 RX ADMIN — PIPERACILLIN AND TAZOBACTAM 3375 MG: 3; .375 INJECTION, POWDER, LYOPHILIZED, FOR SOLUTION INTRAVENOUS at 10:28

## 2024-12-07 RX ADMIN — MORPHINE SULFATE 4 MG: 4 INJECTION, SOLUTION INTRAMUSCULAR; INTRAVENOUS at 16:58

## 2024-12-07 RX ADMIN — SODIUM CHLORIDE: 9 INJECTION, SOLUTION INTRAVENOUS at 14:13

## 2024-12-07 RX ADMIN — HYDROMORPHONE HYDROCHLORIDE 0.5 MG: 1 INJECTION, SOLUTION INTRAMUSCULAR; INTRAVENOUS; SUBCUTANEOUS at 17:48

## 2024-12-07 RX ADMIN — SODIUM CHLORIDE, PRESERVATIVE FREE 10 ML: 5 INJECTION INTRAVENOUS at 20:54

## 2024-12-07 RX ADMIN — ONDANSETRON 4 MG: 2 INJECTION, SOLUTION INTRAMUSCULAR; INTRAVENOUS at 08:46

## 2024-12-07 RX ADMIN — MORPHINE SULFATE 4 MG: 4 INJECTION, SOLUTION INTRAMUSCULAR; INTRAVENOUS at 12:20

## 2024-12-07 RX ADMIN — HYDROMORPHONE HYDROCHLORIDE 0.5 MG: 1 INJECTION, SOLUTION INTRAMUSCULAR; INTRAVENOUS; SUBCUTANEOUS at 22:19

## 2024-12-07 RX ADMIN — MORPHINE SULFATE 2 MG: 2 INJECTION, SOLUTION INTRAMUSCULAR; INTRAVENOUS at 08:46

## 2024-12-07 RX ADMIN — PANTOPRAZOLE SODIUM 40 MG: 40 INJECTION, POWDER, FOR SOLUTION INTRAVENOUS at 13:23

## 2024-12-07 ASSESSMENT — PAIN - FUNCTIONAL ASSESSMENT
PAIN_FUNCTIONAL_ASSESSMENT: ACTIVITIES ARE NOT PREVENTED

## 2024-12-07 ASSESSMENT — LIFESTYLE VARIABLES
HOW MANY STANDARD DRINKS CONTAINING ALCOHOL DO YOU HAVE ON A TYPICAL DAY: PATIENT DOES NOT DRINK
HOW OFTEN DO YOU HAVE A DRINK CONTAINING ALCOHOL: NEVER
HOW OFTEN DO YOU HAVE A DRINK CONTAINING ALCOHOL: NEVER
HOW MANY STANDARD DRINKS CONTAINING ALCOHOL DO YOU HAVE ON A TYPICAL DAY: PATIENT DOES NOT DRINK

## 2024-12-07 ASSESSMENT — PAIN DESCRIPTION - DESCRIPTORS
DESCRIPTORS: SORE
DESCRIPTORS: CRAMPING
DESCRIPTORS: SHARP;SORE
DESCRIPTORS: CRAMPING

## 2024-12-07 ASSESSMENT — PAIN DESCRIPTION - ONSET
ONSET: SUDDEN
ONSET: SUDDEN
ONSET: ON-GOING
ONSET: SUDDEN
ONSET: ON-GOING
ONSET: SUDDEN

## 2024-12-07 ASSESSMENT — PAIN SCALES - GENERAL
PAINLEVEL_OUTOF10: 7
PAINLEVEL_OUTOF10: 7
PAINLEVEL_OUTOF10: 4
PAINLEVEL_OUTOF10: 0
PAINLEVEL_OUTOF10: 4
PAINLEVEL_OUTOF10: 8
PAINLEVEL_OUTOF10: 8
PAINLEVEL_OUTOF10: 3
PAINLEVEL_OUTOF10: 4
PAINLEVEL_OUTOF10: 7
PAINLEVEL_OUTOF10: 8
PAINLEVEL_OUTOF10: 5
PAINLEVEL_OUTOF10: 5
PAINLEVEL_OUTOF10: 7
PAINLEVEL_OUTOF10: 7

## 2024-12-07 ASSESSMENT — PAIN DESCRIPTION - LOCATION
LOCATION: ABDOMEN

## 2024-12-07 ASSESSMENT — PAIN DESCRIPTION - PAIN TYPE
TYPE: ACUTE PAIN

## 2024-12-07 ASSESSMENT — PAIN DESCRIPTION - FREQUENCY
FREQUENCY: INTERMITTENT

## 2024-12-07 ASSESSMENT — PAIN DESCRIPTION - ORIENTATION
ORIENTATION: LEFT;LOWER
ORIENTATION: LEFT;LOWER
ORIENTATION: LEFT;UPPER
ORIENTATION: LEFT;UPPER
ORIENTATION: LEFT;LOWER

## 2024-12-07 NOTE — ED PROVIDER NOTES
[facility] EMERGENCY DEPT     EMERGENCY DEPARTMENT ENCOUNTER            Pt Name: Edward Barlow   MRN: [id number]   Birthdate 1950   Date of evaluation: 12/7/2024   Provider: Rolando Arenas MD   PCP: Ellis Ordonez MD   Note Started: 8:32 AM EST 12/7/24          CHIEF COMPLAINT     Chief Complaint   Patient presents with    Abdominal Pain           HISTORY OF PRESENT ILLNESS:   History from : Patient and Family wife    Limitations to history : None     Edward Barlow is a 74 y.o. male who  has a past medical history of Bladder cancer (HCC), Celiac disease, Hyperlipidemia, Hypertension, and TIA (transient ischemic attack). presents complaining of upper abdominal pain has been present intermittently over the past several months but got worse over the past 2 days.  States he has had nausea but denies any vomiting.  Has difficulty eating secondary to his nausea.  No fevers.  No lower abdominal pain.  Normal urinary habits.  States he has constipation but has been taking MiraLAX which does allow patient to have a bowel movement.  No chest pain or shortness of breath.  No cough.  Patient states that he has a history of pancreatitis which he was admitted for here at [facility] 11/19/2024.  Patient states he had and the EUS on 10/23/2024 which showed a 7.4 mm x 7.2 mm mass in the lower third of the main bile duct that had fine-needle biopsy which wife states came back negative for acute abnormality.  Patient had a biliary stent placed as well as biliary sphincterotomy.  Patient had an additional EUS on 11/20/2024 which showed esophagitis and a large amount of residual food in the stomach which was suctioned and 1 nonbleeding duodenal ulcer which was biopsied.  Patient states he was discharged with Protonix but did not take it today prior to coming to the ED.  No medications taken for pain control.    Nursing Notes were all reviewed and agreed with, or any disagreements were addressed in the HPI.

## 2024-12-07 NOTE — ED NOTES
Admitting provider at bedside.   
Pt already received dose of antibiotics while in the ED. Contacted LOCO Henley who states that she still wants both sets of blood cultures drawn.   
Pt instructed on narcotic/sedation safety, instructed not to drive, operate heavy machinery or drink alcohol while taking narcotic or sedating-type medications  Pt Verbalizes understanding of these verbal instructions.     
Status Blood return noted;Flushed;Normal saline locked 12/07/24 0846          PO Status:     Pertinent or High Risk Medications/Drips: no   If Yes, please provide details:   Pending Blood Product Administration: no   Medications administered in Emergency Department [unfilled]    Home Medications   Current Facility-Administered Medications   Medication Dose Route Frequency Provider Last Rate Last Admin    piperacillin-tazobactam (ZOSYN) 3,375 mg in sodium chloride 0.9 % 50 mL IVPB ([id number])  3,375 mg IntraVENous Once Rolando Arenas  mL/hr at 12/07/24 1028 3,375 mg at 12/07/24 1028     Current Outpatient Medications   Medication Sig Dispense Refill    pantoprazole (PROTONIX) 40 MG tablet Take 1 tablet by mouth daily 30 tablet 1    labetalol (NORMODYNE) 100 MG tablet Take 0.5 tablets by mouth 2 times daily      atorvastatin (LIPITOR) 40 MG tablet Take 1 tablet by mouth daily      Multiple Vitamins-Minerals (THERAPEUTIC MULTIVITAMIN-MINERALS) tablet Take 1 tablet by mouth daily      Omega-3 Fatty Acids (FISH OIL) 1000 MG capsule Take 1 capsule by mouth daily      vitamin E 400 UNIT capsule Take 1 capsule by mouth daily      vitamin C (ASCORBIC ACID) 500 MG tablet Take 1 tablet by mouth daily      cholecalciferol (VITAMIN D3) 400 UNIT TABS tablet Take 1 tablet by mouth daily      Coenzyme Q10 (CO Q 10) 10 MG CAPS Take 1 capsule by mouth daily      amLODIPine (NORVASC) 5 MG tablet Take 1 tablet by mouth daily      finasteride (PROSCAR) 5 MG tablet TAKE 1 TABLET DAILY 90 tablet 2    triamcinolone (ARISTOCORT) 0.5 % cream Apply topically 3 times daily. 4 Tube 6    aspirin 81 MG EC tablet Take 1 tablet by mouth daily            You may also review the ED PT Care Timeline found under the Summary Nursing Index tab.    Recommendation    Additional Comments: Pt ambulates independently at home.      Room assignment:   Inpatient nurse:   Inpatient nurse phone number:     Emergency department Nurse:   Nurse call back #:

## 2024-12-07 NOTE — ED TRIAGE NOTES
Pt arrives to the ED with c/o LUQ abdominal pain for months. Pt states that he was discharged from here on 11/20 after having and endoscopy. +nausea and constipation    Hx: pancreatitis

## 2024-12-08 LAB
ANION GAP SERPL CALCULATED.3IONS-SCNC: 8 MMOL/L (ref 3–16)
BASOPHILS # BLD: 0.01 K/UL (ref 0–0.2)
BASOPHILS NFR BLD: 0 %
BUN SERPL-MCNC: 12 MG/DL (ref 7–20)
CALCIUM SERPL-MCNC: 9 MG/DL (ref 8.3–10.6)
CHLORIDE SERPL-SCNC: 104 MMOL/L (ref 99–110)
CO2 SERPL-SCNC: 23 MMOL/L (ref 21–32)
CREAT SERPL-MCNC: 0.7 MG/DL (ref 0.8–1.3)
EKG ATRIAL RATE: 89 BPM
EKG DIAGNOSIS: NORMAL
EKG P AXIS: 66 DEGREES
EKG P-R INTERVAL: 160 MS
EKG Q-T INTERVAL: 348 MS
EKG QRS DURATION: 80 MS
EKG QTC CALCULATION (BAZETT): 423 MS
EKG R AXIS: 29 DEGREES
EKG T AXIS: 73 DEGREES
EKG VENTRICULAR RATE: 89 BPM
EOSINOPHIL # BLD: 0.04 K/UL (ref 0–0.6)
EOSINOPHILS RELATIVE PERCENT: 0 %
ERYTHROCYTE [DISTWIDTH] IN BLOOD BY AUTOMATED COUNT: 13.1 % (ref 12.4–15.4)
GFR, ESTIMATED: >90 ML/MIN/1.73M2
GLUCOSE SERPL-MCNC: 106 MG/DL (ref 70–99)
HCT VFR BLD AUTO: 33.1 % (ref 40.5–52.5)
HGB BLD-MCNC: 10.9 G/DL (ref 13.5–17.5)
IMM GRANULOCYTES # BLD AUTO: 0.09 K/UL (ref 0–0.5)
IMM GRANULOCYTES NFR BLD: 1 %
LYMPHOCYTES NFR BLD: 1.34 K/UL (ref 1–5.1)
LYMPHOCYTES RELATIVE PERCENT: 9 %
MCH RBC QN AUTO: 30.3 PG (ref 26–34)
MCHC RBC AUTO-ENTMCNC: 32.9 G/DL (ref 31–36)
MCV RBC AUTO: 91.9 FL (ref 80–100)
MONOCYTES NFR BLD: 1.13 K/UL (ref 0–1.3)
MONOCYTES NFR BLD: 7 %
NEUTROPHILS NFR BLD: 83 %
NEUTS SEG NFR BLD: 12.88 K/UL (ref 1.7–7.7)
PLATELET # BLD AUTO: 254 K/UL (ref 135–450)
PMV BLD AUTO: 10.3 FL
POTASSIUM SERPL-SCNC: 4.7 MMOL/L (ref 3.5–5.1)
RBC # BLD AUTO: 3.6 M/UL (ref 4.2–5.9)
SODIUM SERPL-SCNC: 135 MMOL/L (ref 136–145)
WBC OTHER # BLD: 15.5 K/UL (ref 4–11)

## 2024-12-08 PROCEDURE — 2700000000 HC OXYGEN THERAPY PER DAY

## 2024-12-08 PROCEDURE — 2580000003 HC RX 258: Performed by: NURSE PRACTITIONER

## 2024-12-08 PROCEDURE — 80048 BASIC METABOLIC PNL TOTAL CA: CPT

## 2024-12-08 PROCEDURE — 94761 N-INVAS EAR/PLS OXIMETRY MLT: CPT

## 2024-12-08 PROCEDURE — 6360000002 HC RX W HCPCS: Performed by: NURSE PRACTITIONER

## 2024-12-08 PROCEDURE — 93010 ELECTROCARDIOGRAM REPORT: CPT | Performed by: INTERNAL MEDICINE

## 2024-12-08 PROCEDURE — 6370000000 HC RX 637 (ALT 250 FOR IP): Performed by: NURSE PRACTITIONER

## 2024-12-08 PROCEDURE — 6360000002 HC RX W HCPCS: Performed by: INTERNAL MEDICINE

## 2024-12-08 PROCEDURE — 36415 COLL VENOUS BLD VENIPUNCTURE: CPT

## 2024-12-08 PROCEDURE — 99223 1ST HOSP IP/OBS HIGH 75: CPT | Performed by: SURGERY

## 2024-12-08 PROCEDURE — 2580000003 HC RX 258: Performed by: INTERNAL MEDICINE

## 2024-12-08 PROCEDURE — 2060000000 HC ICU INTERMEDIATE R&B

## 2024-12-08 PROCEDURE — 85025 COMPLETE CBC W/AUTO DIFF WBC: CPT

## 2024-12-08 RX ORDER — SODIUM CHLORIDE 9 MG/ML
INJECTION, SOLUTION INTRAVENOUS CONTINUOUS
Status: DISCONTINUED | OUTPATIENT
Start: 2024-12-08 | End: 2024-12-10

## 2024-12-08 RX ADMIN — HYDROMORPHONE HYDROCHLORIDE 0.5 MG: 1 INJECTION, SOLUTION INTRAMUSCULAR; INTRAVENOUS; SUBCUTANEOUS at 08:40

## 2024-12-08 RX ADMIN — BISACODYL 10 MG: 10 SUPPOSITORY RECTAL at 08:51

## 2024-12-08 RX ADMIN — SODIUM CHLORIDE: 9 INJECTION, SOLUTION INTRAVENOUS at 06:33

## 2024-12-08 RX ADMIN — SODIUM CHLORIDE, PRESERVATIVE FREE 10 ML: 5 INJECTION INTRAVENOUS at 08:41

## 2024-12-08 RX ADMIN — PANTOPRAZOLE SODIUM 40 MG: 40 INJECTION, POWDER, FOR SOLUTION INTRAVENOUS at 13:26

## 2024-12-08 RX ADMIN — PANTOPRAZOLE SODIUM 40 MG: 40 INJECTION, POWDER, FOR SOLUTION INTRAVENOUS at 02:15

## 2024-12-08 RX ADMIN — HYDROMORPHONE HYDROCHLORIDE 0.5 MG: 1 INJECTION, SOLUTION INTRAMUSCULAR; INTRAVENOUS; SUBCUTANEOUS at 15:04

## 2024-12-08 RX ADMIN — PIPERACILLIN AND TAZOBACTAM 3375 MG: 3; .375 INJECTION, POWDER, LYOPHILIZED, FOR SOLUTION INTRAVENOUS at 02:14

## 2024-12-08 RX ADMIN — PIPERACILLIN AND TAZOBACTAM 3375 MG: 3; .375 INJECTION, POWDER, LYOPHILIZED, FOR SOLUTION INTRAVENOUS at 10:42

## 2024-12-08 RX ADMIN — HYDROMORPHONE HYDROCHLORIDE 0.5 MG: 1 INJECTION, SOLUTION INTRAMUSCULAR; INTRAVENOUS; SUBCUTANEOUS at 04:33

## 2024-12-08 RX ADMIN — SODIUM CHLORIDE, PRESERVATIVE FREE 10 ML: 5 INJECTION INTRAVENOUS at 22:13

## 2024-12-08 RX ADMIN — ENOXAPARIN SODIUM 40 MG: 100 INJECTION SUBCUTANEOUS at 08:40

## 2024-12-08 RX ADMIN — PIPERACILLIN AND TAZOBACTAM 3375 MG: 3; .375 INJECTION, POWDER, LYOPHILIZED, FOR SOLUTION INTRAVENOUS at 18:21

## 2024-12-08 RX ADMIN — SODIUM CHLORIDE: 9 INJECTION, SOLUTION INTRAVENOUS at 18:36

## 2024-12-08 ASSESSMENT — PAIN DESCRIPTION - LOCATION
LOCATION: ABDOMEN

## 2024-12-08 ASSESSMENT — PAIN SCALES - GENERAL
PAINLEVEL_OUTOF10: 0
PAINLEVEL_OUTOF10: 0
PAINLEVEL_OUTOF10: 7
PAINLEVEL_OUTOF10: 2
PAINLEVEL_OUTOF10: 7
PAINLEVEL_OUTOF10: 4
PAINLEVEL_OUTOF10: 3

## 2024-12-08 ASSESSMENT — PAIN DESCRIPTION - ONSET
ONSET: ON-GOING
ONSET: ON-GOING

## 2024-12-08 ASSESSMENT — PAIN DESCRIPTION - ORIENTATION: ORIENTATION: LEFT;LOWER

## 2024-12-08 ASSESSMENT — PAIN DESCRIPTION - DESCRIPTORS
DESCRIPTORS: SORE
DESCRIPTORS: ACHING;CRAMPING
DESCRIPTORS: ACHING;CRAMPING
DESCRIPTORS: CRAMPING;SHARP
DESCRIPTORS: SORE

## 2024-12-08 ASSESSMENT — PAIN - FUNCTIONAL ASSESSMENT
PAIN_FUNCTIONAL_ASSESSMENT: ACTIVITIES ARE NOT PREVENTED

## 2024-12-08 ASSESSMENT — PAIN DESCRIPTION - FREQUENCY
FREQUENCY: INTERMITTENT
FREQUENCY: INTERMITTENT

## 2024-12-08 ASSESSMENT — PAIN DESCRIPTION - PAIN TYPE
TYPE: ACUTE PAIN
TYPE: ACUTE PAIN

## 2024-12-09 ENCOUNTER — APPOINTMENT (OUTPATIENT)
Age: 74
DRG: 853 | End: 2024-12-09
Payer: MEDICARE

## 2024-12-09 ENCOUNTER — ANESTHESIA (OUTPATIENT)
Age: 74
End: 2024-12-09
Payer: MEDICARE

## 2024-12-09 ENCOUNTER — ANESTHESIA EVENT (OUTPATIENT)
Age: 74
End: 2024-12-09
Payer: MEDICARE

## 2024-12-09 LAB
ALBUMIN SERPL-MCNC: 2.4 G/DL (ref 3.4–5)
ALBUMIN/GLOB SERPL: 0.7 {RATIO}
ALP SERPL-CCNC: 93 U/L (ref 40–129)
ALT SERPL-CCNC: 27 U/L (ref 10–40)
ANION GAP SERPL CALCULATED.3IONS-SCNC: 11 MMOL/L (ref 3–16)
AST SERPL-CCNC: 36 U/L (ref 15–37)
BASOPHILS # BLD: 0.02 K/UL (ref 0–0.2)
BASOPHILS NFR BLD: 0 %
BILIRUB SERPL-MCNC: 0.4 MG/DL (ref 0–1)
BUN SERPL-MCNC: 13 MG/DL (ref 7–20)
CALCIUM SERPL-MCNC: 8.9 MG/DL (ref 8.3–10.6)
CHLORIDE SERPL-SCNC: 105 MMOL/L (ref 99–110)
CO2 SERPL-SCNC: 19 MMOL/L (ref 21–32)
CREAT SERPL-MCNC: 0.7 MG/DL (ref 0.8–1.3)
EOSINOPHIL # BLD: 0.11 K/UL (ref 0–0.6)
EOSINOPHILS RELATIVE PERCENT: 1 %
ERYTHROCYTE [DISTWIDTH] IN BLOOD BY AUTOMATED COUNT: 13.3 % (ref 12.4–15.4)
GFR, ESTIMATED: >90 ML/MIN/1.73M2
GLUCOSE SERPL-MCNC: 97 MG/DL (ref 70–99)
HCT VFR BLD AUTO: 31 % (ref 40.5–52.5)
HGB BLD-MCNC: 10.2 G/DL (ref 13.5–17.5)
IMM GRANULOCYTES # BLD AUTO: 0.06 K/UL (ref 0–0.5)
IMM GRANULOCYTES NFR BLD: 1 %
LYMPHOCYTES NFR BLD: 1.75 K/UL (ref 1–5.1)
LYMPHOCYTES RELATIVE PERCENT: 14 %
MCH RBC QN AUTO: 30.1 PG (ref 26–34)
MCHC RBC AUTO-ENTMCNC: 32.9 G/DL (ref 31–36)
MCV RBC AUTO: 91.4 FL (ref 80–100)
MONOCYTES NFR BLD: 0.9 K/UL (ref 0–1.3)
MONOCYTES NFR BLD: 7 %
NEUTROPHILS NFR BLD: 77 %
NEUTS SEG NFR BLD: 9.36 K/UL (ref 1.7–7.7)
PLATELET # BLD AUTO: 256 K/UL (ref 135–450)
PMV BLD AUTO: 10.2 FL
POTASSIUM SERPL-SCNC: 3.8 MMOL/L (ref 3.5–5.1)
PROT SERPL-MCNC: 5.8 G/DL (ref 6.4–8.2)
RBC # BLD AUTO: 3.39 M/UL (ref 4.2–5.9)
SODIUM SERPL-SCNC: 136 MMOL/L (ref 136–145)
URATE SERPL-MCNC: 3.1 MG/DL (ref 3.5–7.2)
WBC OTHER # BLD: 12.2 K/UL (ref 4–11)

## 2024-12-09 PROCEDURE — 6360000002 HC RX W HCPCS: Performed by: ANESTHESIOLOGY

## 2024-12-09 PROCEDURE — 3700000001 HC ADD 15 MINUTES (ANESTHESIA): Performed by: INTERNAL MEDICINE

## 2024-12-09 PROCEDURE — 2580000003 HC RX 258: Performed by: NURSE PRACTITIONER

## 2024-12-09 PROCEDURE — 2500000003 HC RX 250 WO HCPCS: Performed by: NURSE ANESTHETIST, CERTIFIED REGISTERED

## 2024-12-09 PROCEDURE — 99232 SBSQ HOSP IP/OBS MODERATE 35: CPT | Performed by: SURGERY

## 2024-12-09 PROCEDURE — 2709999900 HC NON-CHARGEABLE SUPPLY: Performed by: INTERNAL MEDICINE

## 2024-12-09 PROCEDURE — 2580000003 HC RX 258: Performed by: INTERNAL MEDICINE

## 2024-12-09 PROCEDURE — 6360000002 HC RX W HCPCS: Performed by: NURSE PRACTITIONER

## 2024-12-09 PROCEDURE — 7100000000 HC PACU RECOVERY - FIRST 15 MIN: Performed by: INTERNAL MEDICINE

## 2024-12-09 PROCEDURE — 36415 COLL VENOUS BLD VENIPUNCTURE: CPT

## 2024-12-09 PROCEDURE — 84550 ASSAY OF BLOOD/URIC ACID: CPT

## 2024-12-09 PROCEDURE — 3700000000 HC ANESTHESIA ATTENDED CARE: Performed by: INTERNAL MEDICINE

## 2024-12-09 PROCEDURE — 85025 COMPLETE CBC W/AUTO DIFF WBC: CPT

## 2024-12-09 PROCEDURE — 80053 COMPREHEN METABOLIC PANEL: CPT

## 2024-12-09 PROCEDURE — 0DH68UZ INSERTION OF FEEDING DEVICE INTO STOMACH, VIA NATURAL OR ARTIFICIAL OPENING ENDOSCOPIC: ICD-10-PCS | Performed by: INTERNAL MEDICINE

## 2024-12-09 PROCEDURE — 2060000000 HC ICU INTERMEDIATE R&B

## 2024-12-09 PROCEDURE — 74018 RADEX ABDOMEN 1 VIEW: CPT

## 2024-12-09 PROCEDURE — 0D968ZZ DRAINAGE OF STOMACH, VIA NATURAL OR ARTIFICIAL OPENING ENDOSCOPIC: ICD-10-PCS | Performed by: INTERNAL MEDICINE

## 2024-12-09 PROCEDURE — 6360000002 HC RX W HCPCS: Performed by: INTERNAL MEDICINE

## 2024-12-09 PROCEDURE — 7100000001 HC PACU RECOVERY - ADDTL 15 MIN: Performed by: INTERNAL MEDICINE

## 2024-12-09 PROCEDURE — 3609013300 HC EGD TUBE PLACEMENT: Performed by: INTERNAL MEDICINE

## 2024-12-09 PROCEDURE — 6360000002 HC RX W HCPCS: Performed by: NURSE ANESTHETIST, CERTIFIED REGISTERED

## 2024-12-09 RX ORDER — SUCCINYLCHOLINE/SOD CL,ISO/PF 200MG/10ML
SYRINGE (ML) INTRAVENOUS
Status: DISCONTINUED | OUTPATIENT
Start: 2024-12-09 | End: 2024-12-09 | Stop reason: SDUPTHER

## 2024-12-09 RX ORDER — PROPOFOL 10 MG/ML
INJECTION, EMULSION INTRAVENOUS
Status: DISCONTINUED | OUTPATIENT
Start: 2024-12-09 | End: 2024-12-09 | Stop reason: SDUPTHER

## 2024-12-09 RX ORDER — METOCLOPRAMIDE HYDROCHLORIDE 5 MG/ML
INJECTION INTRAMUSCULAR; INTRAVENOUS
Status: DISCONTINUED | OUTPATIENT
Start: 2024-12-09 | End: 2024-12-09 | Stop reason: SDUPTHER

## 2024-12-09 RX ORDER — LIDOCAINE HYDROCHLORIDE 20 MG/ML
INJECTION, SOLUTION EPIDURAL; INFILTRATION; INTRACAUDAL; PERINEURAL
Status: DISCONTINUED | OUTPATIENT
Start: 2024-12-09 | End: 2024-12-09 | Stop reason: SDUPTHER

## 2024-12-09 RX ORDER — DROPERIDOL 2.5 MG/ML
0.62 INJECTION, SOLUTION INTRAMUSCULAR; INTRAVENOUS
Status: DISCONTINUED | OUTPATIENT
Start: 2024-12-09 | End: 2024-12-09 | Stop reason: HOSPADM

## 2024-12-09 RX ORDER — SODIUM CHLORIDE 0.9 % (FLUSH) 0.9 %
5-40 SYRINGE (ML) INJECTION EVERY 12 HOURS SCHEDULED
Status: DISCONTINUED | OUTPATIENT
Start: 2024-12-09 | End: 2024-12-09 | Stop reason: HOSPADM

## 2024-12-09 RX ORDER — SODIUM CHLORIDE 0.9 % (FLUSH) 0.9 %
5-40 SYRINGE (ML) INJECTION PRN
Status: DISCONTINUED | OUTPATIENT
Start: 2024-12-09 | End: 2024-12-09 | Stop reason: HOSPADM

## 2024-12-09 RX ORDER — ONDANSETRON 2 MG/ML
4 INJECTION INTRAMUSCULAR; INTRAVENOUS
Status: DISCONTINUED | OUTPATIENT
Start: 2024-12-09 | End: 2024-12-09 | Stop reason: HOSPADM

## 2024-12-09 RX ORDER — NALOXONE HYDROCHLORIDE 0.4 MG/ML
INJECTION, SOLUTION INTRAMUSCULAR; INTRAVENOUS; SUBCUTANEOUS PRN
Status: DISCONTINUED | OUTPATIENT
Start: 2024-12-09 | End: 2024-12-09 | Stop reason: HOSPADM

## 2024-12-09 RX ORDER — ONDANSETRON 2 MG/ML
INJECTION INTRAMUSCULAR; INTRAVENOUS
Status: DISCONTINUED | OUTPATIENT
Start: 2024-12-09 | End: 2024-12-09 | Stop reason: SDUPTHER

## 2024-12-09 RX ORDER — SODIUM CHLORIDE 9 MG/ML
INJECTION, SOLUTION INTRAVENOUS PRN
Status: DISCONTINUED | OUTPATIENT
Start: 2024-12-09 | End: 2024-12-09 | Stop reason: HOSPADM

## 2024-12-09 RX ADMIN — HYDROMORPHONE HYDROCHLORIDE 0.5 MG: 1 INJECTION, SOLUTION INTRAMUSCULAR; INTRAVENOUS; SUBCUTANEOUS at 22:24

## 2024-12-09 RX ADMIN — HYDROMORPHONE HYDROCHLORIDE 0.5 MG: 1 INJECTION, SOLUTION INTRAMUSCULAR; INTRAVENOUS; SUBCUTANEOUS at 20:03

## 2024-12-09 RX ADMIN — SODIUM CHLORIDE: 9 INJECTION, SOLUTION INTRAVENOUS at 23:50

## 2024-12-09 RX ADMIN — SODIUM CHLORIDE, PRESERVATIVE FREE 10 ML: 5 INJECTION INTRAVENOUS at 20:05

## 2024-12-09 RX ADMIN — PIPERACILLIN AND TAZOBACTAM 3375 MG: 3; .375 INJECTION, POWDER, LYOPHILIZED, FOR SOLUTION INTRAVENOUS at 18:37

## 2024-12-09 RX ADMIN — LIDOCAINE HYDROCHLORIDE 60 MG: 20 INJECTION, SOLUTION EPIDURAL; INFILTRATION; INTRACAUDAL; PERINEURAL at 14:18

## 2024-12-09 RX ADMIN — HYDROMORPHONE HYDROCHLORIDE 0.5 MG: 1 INJECTION, SOLUTION INTRAMUSCULAR; INTRAVENOUS; SUBCUTANEOUS at 10:16

## 2024-12-09 RX ADMIN — HYDROMORPHONE HYDROCHLORIDE 0.5 MG: 1 INJECTION, SOLUTION INTRAMUSCULAR; INTRAVENOUS; SUBCUTANEOUS at 16:29

## 2024-12-09 RX ADMIN — PANTOPRAZOLE SODIUM 40 MG: 40 INJECTION, POWDER, FOR SOLUTION INTRAVENOUS at 12:30

## 2024-12-09 RX ADMIN — ONDANSETRON 4 MG: 2 INJECTION, SOLUTION INTRAMUSCULAR; INTRAVENOUS at 14:29

## 2024-12-09 RX ADMIN — Medication 140 MG: at 14:24

## 2024-12-09 RX ADMIN — PIPERACILLIN AND TAZOBACTAM 3375 MG: 3; .375 INJECTION, POWDER, LYOPHILIZED, FOR SOLUTION INTRAVENOUS at 10:20

## 2024-12-09 RX ADMIN — PANTOPRAZOLE SODIUM 40 MG: 40 INJECTION, POWDER, FOR SOLUTION INTRAVENOUS at 01:54

## 2024-12-09 RX ADMIN — PIPERACILLIN AND TAZOBACTAM 3375 MG: 3; .375 INJECTION, POWDER, LYOPHILIZED, FOR SOLUTION INTRAVENOUS at 01:57

## 2024-12-09 RX ADMIN — PROPOFOL 70 MG: 10 INJECTION, EMULSION INTRAVENOUS at 14:18

## 2024-12-09 RX ADMIN — SODIUM CHLORIDE: 9 INJECTION, SOLUTION INTRAVENOUS at 16:31

## 2024-12-09 RX ADMIN — SODIUM CHLORIDE, PRESERVATIVE FREE 10 ML: 5 INJECTION INTRAVENOUS at 07:25

## 2024-12-09 RX ADMIN — METOCLOPRAMIDE 10 MG: 5 INJECTION, SOLUTION INTRAMUSCULAR; INTRAVENOUS at 13:18

## 2024-12-09 RX ADMIN — HYDROMORPHONE HYDROCHLORIDE 0.5 MG: 1 INJECTION, SOLUTION INTRAMUSCULAR; INTRAVENOUS; SUBCUTANEOUS at 01:53

## 2024-12-09 RX ADMIN — HYDROMORPHONE HYDROCHLORIDE 0.5 MG: 1 INJECTION, SOLUTION INTRAMUSCULAR; INTRAVENOUS; SUBCUTANEOUS at 07:23

## 2024-12-09 RX ADMIN — PROPOFOL 140 MCG/KG/MIN: 10 INJECTION, EMULSION INTRAVENOUS at 14:20

## 2024-12-09 ASSESSMENT — PAIN DESCRIPTION - DESCRIPTORS
DESCRIPTORS: CRAMPING
DESCRIPTORS: ACHING
DESCRIPTORS: CRAMPING
DESCRIPTORS: ACHING
DESCRIPTORS: CRAMPING
DESCRIPTORS: CRAMPING

## 2024-12-09 ASSESSMENT — PAIN DESCRIPTION - PAIN TYPE
TYPE: ACUTE PAIN

## 2024-12-09 ASSESSMENT — PAIN SCALES - GENERAL
PAINLEVEL_OUTOF10: 3
PAINLEVEL_OUTOF10: 7
PAINLEVEL_OUTOF10: 0
PAINLEVEL_OUTOF10: 4
PAINLEVEL_OUTOF10: 3
PAINLEVEL_OUTOF10: 0
PAINLEVEL_OUTOF10: 0
PAINLEVEL_OUTOF10: 2
PAINLEVEL_OUTOF10: 7
PAINLEVEL_OUTOF10: 0
PAINLEVEL_OUTOF10: 4
PAINLEVEL_OUTOF10: 2
PAINLEVEL_OUTOF10: 2
PAINLEVEL_OUTOF10: 3
PAINLEVEL_OUTOF10: 3
PAINLEVEL_OUTOF10: 0

## 2024-12-09 ASSESSMENT — PAIN DESCRIPTION - FREQUENCY
FREQUENCY: INTERMITTENT

## 2024-12-09 ASSESSMENT — PAIN - FUNCTIONAL ASSESSMENT
PAIN_FUNCTIONAL_ASSESSMENT: ACTIVITIES ARE NOT PREVENTED
PAIN_FUNCTIONAL_ASSESSMENT: PREVENTS OR INTERFERES WITH ALL ACTIVE AND SOME PASSIVE ACTIVITIES
PAIN_FUNCTIONAL_ASSESSMENT: ACTIVITIES ARE NOT PREVENTED
PAIN_FUNCTIONAL_ASSESSMENT: ACTIVITIES ARE NOT PREVENTED

## 2024-12-09 ASSESSMENT — PAIN DESCRIPTION - ONSET
ONSET: ON-GOING

## 2024-12-09 ASSESSMENT — PAIN SCALES - WONG BAKER
WONGBAKER_NUMERICALRESPONSE: NO HURT

## 2024-12-09 ASSESSMENT — PAIN DESCRIPTION - LOCATION
LOCATION: ABDOMEN

## 2024-12-09 ASSESSMENT — PAIN DESCRIPTION - ORIENTATION
ORIENTATION: MID
ORIENTATION: RIGHT;LEFT
ORIENTATION: MID

## 2024-12-09 NOTE — ANESTHESIA POSTPROCEDURE EVALUATION
Department of Anesthesiology  Postprocedure Note    Patient: Edward Barlow  MRN: [id number]  YOB: 1950  Date of evaluation: 12/9/2024    Procedure Summary       Date: 12/09/24 Room / Location: [place] [address] / [facility]    Anesthesia Start: 1412 Anesthesia Stop: 1459    Procedure: ESOPHAGOGASTRODUODENOSCOPY WITH NASAL-JEJUNAL TUBE PLACEMENT Diagnosis:       Severe acute pancreatitis      (Severe acute pancreatitis [K85.90])    Surgeons: Tom Dias MD Responsible Provider: Justin Mckinney MD    Anesthesia Type: MAC ASA Status: 3            Anesthesia Type: No value filed.    Adrianna Phase I: Adrianna Score: 10    Adrianna Phase II:      Anesthesia Post Evaluation    Patient location during evaluation: PACU  Patient participation: complete - patient participated  Level of consciousness: awake  Airway patency: patent  Nausea & Vomiting: no nausea and no vomiting  Cardiovascular status: blood pressure returned to baseline  Respiratory status: acceptable  Hydration status: stable  Comments: Vital signs stable  OK to discharge from Stage I post anesthesia care.  Care transferred from Anesthesiology department on discharge from perioperative area     He has had 2 procedures where stomach was filled with large volume bilious fluid. This second time was despite being given a dose of reglan IV about an hour prior to procedure.     Recommend future EGD be done as GETA until his pancreatitis/gastroparesis issues are resolved.  Multimodal analgesia pain management approach  Pain management: satisfactory to patient        No notable events documented.

## 2024-12-09 NOTE — ANESTHESIA PRE PROCEDURE
Department of Anesthesiology  Preprocedure Note       Name:  Edward Barlow   Age:  74 y.o.  :  1950                                          MRN:  [id number]         Date:  2024      Surgeon: Surgeon(s):  Tom Dias MD    Procedure: Procedure(s):  ESOPHAGOGASTRODUODENOSCOPY PERCUTANEOUS ENDOSCOPIC NASAL-JEJUNAL TUBE PLACEMENT    Medications prior to admission:   Prior to Admission medications    Medication Sig Start Date End Date Taking? Authorizing Provider   pantoprazole (PROTONIX) 40 MG tablet Take 1 tablet by mouth daily 24 Yes Ashley Hooker APRN - CNP   labetalol (NORMODYNE) 100 MG tablet Take 0.5 tablets by mouth 2 times daily   Yes Benny Sheriff MD   atorvastatin (LIPITOR) 40 MG tablet Take 1 tablet by mouth daily   Yes Benny Sheriff MD   Multiple Vitamins-Minerals (THERAPEUTIC MULTIVITAMIN-MINERALS) tablet Take 1 tablet by mouth daily   Yes Benny Sheriff MD   Omega-3 Fatty Acids (FISH OIL) 1000 MG capsule Take 1 capsule by mouth daily   Yes Benny Sheriff MD   vitamin E 400 UNIT capsule Take 1 capsule by mouth daily   Yes Benny Sheriff MD   vitamin C (ASCORBIC ACID) 500 MG tablet Take 1 tablet by mouth daily   Yes Benny Sheriff MD   cholecalciferol (VITAMIN D3) 400 UNIT TABS tablet Take 1 tablet by mouth daily   Yes Benny Sheriff MD   Coenzyme Q10 (CO Q 10) 10 MG CAPS Take 1 capsule by mouth daily   Yes Benny Sheriff MD   amLODIPine (NORVASC) 5 MG tablet Take 1 tablet by mouth daily 3/28/24  Yes Benny Sheriff MD   finasteride (PROSCAR) 5 MG tablet TAKE 1 TABLET DAILY 17  Yes Candelario Pastrana MD   triamcinolone (ARISTOCORT) 0.5 % cream Apply topically 3 times daily. 16  Yes Candelario Pastrana MD   aspirin 81 MG EC tablet Take 1 tablet by mouth daily   Yes Benny Sheriff MD       Current medications:    Current Facility-Administered Medications   Medication Dose Route Frequency Provider Last Rate

## 2024-12-09 NOTE — CARE COORDINATION
Case Management Assessment  Initial Evaluation    Date/Time of Evaluation: 12/9/2024 10:12 AM  Assessment Completed by: WALTER Anderson    If patient is discharged prior to next notation, then this note serves as note for discharge by case management.    Patient Name: Edward Barlow                   YOB: 1950  Diagnosis: Abdominal pain, epigastric [R10.13]  Nausea [R11.0]  Gastric distention [K31.89]  Pancreatitis, unspecified pancreatitis type [K85.90]  Idiopathic acute pancreatitis with infected necrosis [K85.02]                   Date / Time: 12/7/2024  8:25 AM    Patient Admission Status: Inpatient   Readmission Risk (Low < 19, Mod (19-27), High > 27): Readmission Risk Score: 19.4    Current PCP: Ellis Ordonez MD  PCP verified by CM? Yes    Chart Reviewed: Yes      History Provided by: Patient, Significant Other  Patient Orientation: Alert and Oriented    Patient Cognition: Alert    Hospitalization in the last 30 days (Readmission):  Yes    If yes, Readmission Assessment in  Navigator will be completed.    Advance Directives:      Code Status: Full Code   Patient's Primary Decision Maker is: Legal Next of Kin      Discharge Planning:    Patient lives with: Spouse/Significant Other Type of Home: House  Primary Care Giver: Spouse  Patient Support Systems include: Spouse/Significant Other   Current Financial resources: Medicare  Current community resources: None  Current services prior to admission: None            Current DME:              Type of Home Care services:  None    ADLS  Prior functional level: Independent in ADLs/IADLs  Current functional level: Independent in ADLs/IADLs    PT AM-PAC:   /24  OT AM-PAC:   /24    Family can provide assistance at DC: Yes  Would you like Case Management to discuss the discharge plan with any other family members/significant others, and if so, who? No  Plans to Return to Present Housing: Yes  Other Identified Issues/Barriers to RETURNING to current

## 2024-12-09 NOTE — H&P
Gastroenterology Note             Pre-operative History and Physical    Patient: Edward Barlow  : 1950  CSN:     History Obtained From:  patient and/or guardian.     HISTORY OF PRESENT ILLNESS:    The patient is a 74 y.o. male  here for EGD  This very pleasant 74-year-old male comes in with a fairly significant amount of pancreatitis and he has not been able to eat he is losing weight  Today we are going to go ahead and do an upper endoscopy I saw the patient on his medical momin this morning and explained to him that the best way to help this because of the way that his pancreas looks at all the fluid collections and the severity of the pancreatitis that if we fed him for somewhere between 7 and 14 days to the feeding tube that this is really helped him        Past Medical History:    Past Medical History:   Diagnosis Date    Bladder cancer (HCC) 2014    Celiac disease     Hyperlipidemia     Hypertension     TIA (transient ischemic attack)      Past Surgical History:    Past Surgical History:   Procedure Laterality Date    ERCP N/A 10/23/2024    ENDOSCOPIC RETROGRADE CHOLANGIOPANCREATOGRAPHY SPHINCTER/PAPILLOTOMY performed by Tom Dias MD at [facility] ENDOSCOPY    ERCP N/A 10/23/2024    ENDOSCOPIC RETROGRADE CHOLANGIOPANCREATOGRAPHY STONE REMOVAL performed by Tom Dias MD at [facility] ENDOSCOPY    ERCP N/A 10/23/2024    ENDOSCOPIC RETROGRADE CHOLANGIOPANCREATOGRAPHY STENT INSERTION performed by Tom Dias MD at [facility] ENDOSCOPY    MASTECTOMY      double    TUMOR REMOVAL  2014    positive bladder tumor    UPPER GASTROINTESTINAL ENDOSCOPY N/A 10/23/2024    ENDOSCOPIC ULTRASOUND with biopsy performed by Tom Dias MD at [facility] ENDOSCOPY    UPPER GASTROINTESTINAL ENDOSCOPY N/A 2024    ESOPHAGOGASTRODUODENOSCOPY SUBMUCOSAL INJECTION performed by Tom Dias MD at [facility] ENDOSCOPY    VASECTOMY       Medications Prior to Admission:   No current facility-administered medications on file prior to 
Number of children: None    Years of education: None    Highest education level: None   Tobacco Use    Smoking status: Never    Smokeless tobacco: Never   Vaping Use    Vaping status: Never Used   Substance and Sexual Activity    Alcohol use: No    Drug use: Never     Social Determinants of Health     Food Insecurity: No Food Insecurity (11/19/2024)    Hunger Vital Sign     Worried About Running Out of Food in the Last Year: Never true     Ran Out of Food in the Last Year: Never true   Transportation Needs: No Transportation Needs (11/19/2024)    PRAPARE - Transportation     Lack of Transportation (Medical): No     Lack of Transportation (Non-Medical): No   Physical Activity: Insufficiently Active (7/17/2019)    Received from [facility]    Exercise Vital Sign     Days of Exercise per Week: 3 days     Minutes of Exercise per Session: 30 min   Housing Stability: Low Risk  (11/19/2024)    Housing Stability Vital Sign     Unable to Pay for Housing in the Last Year: No     Number of Times Moved in the Last Year: 1     Homeless in the Last Year: No       Medications:   Medications:    sodium chloride flush  5-40 mL IntraVENous 2 times per day    enoxaparin  40 mg SubCUTAneous Daily    0.9 % sodium chloride  30 mL/kg IntraVENous Once    piperacillin-tazobactam  3,375 mg IntraVENous Q6H    pantoprazole (PROTONIX) 40 mg in sodium chloride (PF) 0.9 % 10 mL injection  40 mg IntraVENous Q12H      Infusions:    sodium chloride      sodium chloride       PRN Meds: sodium chloride flush, 5-40 mL, PRN  sodium chloride, , PRN  potassium chloride, 40 mEq, PRN   Or  potassium alternative oral replacement, 40 mEq, PRN   Or  potassium chloride, 10 mEq, PRN  magnesium sulfate, 2,000 mg, PRN  ondansetron, 4 mg, Q8H PRN   Or  ondansetron, 4 mg, Q6H PRN  polyethylene glycol, 17 g, Daily PRN  acetaminophen, 650 mg, Q6H PRN   Or  acetaminophen, 650 mg, Q6H PRN  morphine, 2 mg, Q2H PRN   Or  morphine, 4 mg, Q2H PRN        Labs

## 2024-12-10 ENCOUNTER — APPOINTMENT (OUTPATIENT)
Age: 74
DRG: 853 | End: 2024-12-10
Payer: MEDICARE

## 2024-12-10 PROBLEM — E43 SEVERE MALNUTRITION (HCC): Chronic | Status: ACTIVE | Noted: 2024-12-10

## 2024-12-10 LAB
ALBUMIN SERPL-MCNC: 2.5 G/DL (ref 3.4–5)
ALBUMIN/GLOB SERPL: 0.8 {RATIO}
ALP SERPL-CCNC: 70 U/L (ref 40–129)
ALT SERPL-CCNC: 23 U/L (ref 10–40)
ANION GAP SERPL CALCULATED.3IONS-SCNC: 9 MMOL/L (ref 3–16)
AST SERPL-CCNC: 45 U/L (ref 15–37)
BASOPHILS # BLD: 0.01 K/UL (ref 0–0.2)
BASOPHILS NFR BLD: 0 %
BILIRUB SERPL-MCNC: 0.4 MG/DL (ref 0–1)
BUN SERPL-MCNC: 11 MG/DL (ref 7–20)
CALCIUM SERPL-MCNC: 8.7 MG/DL (ref 8.3–10.6)
CHLORIDE SERPL-SCNC: 110 MMOL/L (ref 99–110)
CO2 SERPL-SCNC: 21 MMOL/L (ref 21–32)
CREAT SERPL-MCNC: 0.6 MG/DL (ref 0.8–1.3)
EOSINOPHIL # BLD: 0.14 K/UL (ref 0–0.6)
EOSINOPHILS RELATIVE PERCENT: 1 %
ERYTHROCYTE [DISTWIDTH] IN BLOOD BY AUTOMATED COUNT: 13.5 % (ref 12.4–15.4)
GFR, ESTIMATED: >90 ML/MIN/1.73M2
GLUCOSE BLD-MCNC: 125 MG/DL (ref 70–99)
GLUCOSE BLD-MCNC: 137 MG/DL (ref 70–99)
GLUCOSE SERPL-MCNC: 138 MG/DL (ref 70–99)
HCT VFR BLD AUTO: 30.9 % (ref 40.5–52.5)
HGB BLD-MCNC: 10.3 G/DL (ref 13.5–17.5)
IMM GRANULOCYTES # BLD AUTO: 0.08 K/UL (ref 0–0.5)
IMM GRANULOCYTES NFR BLD: 1 %
LYMPHOCYTES NFR BLD: 2.04 K/UL (ref 1–5.1)
LYMPHOCYTES RELATIVE PERCENT: 13 %
MCH RBC QN AUTO: 30.2 PG (ref 26–34)
MCHC RBC AUTO-ENTMCNC: 33.3 G/DL (ref 31–36)
MCV RBC AUTO: 90.6 FL (ref 80–100)
MONOCYTES NFR BLD: 1.16 K/UL (ref 0–1.3)
MONOCYTES NFR BLD: 7 %
NEUTROPHILS NFR BLD: 79 %
NEUTS SEG NFR BLD: 12.58 K/UL (ref 1.7–7.7)
PLATELET # BLD AUTO: 266 K/UL (ref 135–450)
PMV BLD AUTO: 9.7 FL
POTASSIUM SERPL-SCNC: 3.7 MMOL/L (ref 3.5–5.1)
PROT SERPL-MCNC: 5.6 G/DL (ref 6.4–8.2)
RBC # BLD AUTO: 3.41 M/UL (ref 4.2–5.9)
SODIUM SERPL-SCNC: 140 MMOL/L (ref 136–145)
WBC OTHER # BLD: 16 K/UL (ref 4–11)

## 2024-12-10 PROCEDURE — 2580000003 HC RX 258: Performed by: INTERNAL MEDICINE

## 2024-12-10 PROCEDURE — 36415 COLL VENOUS BLD VENIPUNCTURE: CPT

## 2024-12-10 PROCEDURE — 71045 X-RAY EXAM CHEST 1 VIEW: CPT

## 2024-12-10 PROCEDURE — 80053 COMPREHEN METABOLIC PANEL: CPT

## 2024-12-10 PROCEDURE — 82962 GLUCOSE BLOOD TEST: CPT

## 2024-12-10 PROCEDURE — 2580000003 HC RX 258: Performed by: NURSE PRACTITIONER

## 2024-12-10 PROCEDURE — 2060000000 HC ICU INTERMEDIATE R&B

## 2024-12-10 PROCEDURE — 6360000002 HC RX W HCPCS: Performed by: NURSE PRACTITIONER

## 2024-12-10 PROCEDURE — 6370000000 HC RX 637 (ALT 250 FOR IP): Performed by: NURSE PRACTITIONER

## 2024-12-10 PROCEDURE — 85025 COMPLETE CBC W/AUTO DIFF WBC: CPT

## 2024-12-10 RX ADMIN — PANTOPRAZOLE SODIUM 40 MG: 40 INJECTION, POWDER, FOR SOLUTION INTRAVENOUS at 01:46

## 2024-12-10 RX ADMIN — HYDROMORPHONE HYDROCHLORIDE 0.5 MG: 1 INJECTION, SOLUTION INTRAMUSCULAR; INTRAVENOUS; SUBCUTANEOUS at 08:08

## 2024-12-10 RX ADMIN — HYDROMORPHONE HYDROCHLORIDE 0.5 MG: 1 INJECTION, SOLUTION INTRAMUSCULAR; INTRAVENOUS; SUBCUTANEOUS at 13:36

## 2024-12-10 RX ADMIN — PANTOPRAZOLE SODIUM 40 MG: 40 INJECTION, POWDER, FOR SOLUTION INTRAVENOUS at 13:36

## 2024-12-10 RX ADMIN — SODIUM CHLORIDE: 9 INJECTION, SOLUTION INTRAVENOUS at 08:05

## 2024-12-10 RX ADMIN — HYDROMORPHONE HYDROCHLORIDE 0.5 MG: 1 INJECTION, SOLUTION INTRAMUSCULAR; INTRAVENOUS; SUBCUTANEOUS at 04:27

## 2024-12-10 RX ADMIN — PIPERACILLIN AND TAZOBACTAM 3375 MG: 3; .375 INJECTION, POWDER, LYOPHILIZED, FOR SOLUTION INTRAVENOUS at 19:49

## 2024-12-10 RX ADMIN — BISACODYL 10 MG: 10 SUPPOSITORY RECTAL at 09:10

## 2024-12-10 RX ADMIN — HYDROMORPHONE HYDROCHLORIDE 0.5 MG: 1 INJECTION, SOLUTION INTRAMUSCULAR; INTRAVENOUS; SUBCUTANEOUS at 22:32

## 2024-12-10 RX ADMIN — HYDROMORPHONE HYDROCHLORIDE 0.5 MG: 1 INJECTION, SOLUTION INTRAMUSCULAR; INTRAVENOUS; SUBCUTANEOUS at 19:46

## 2024-12-10 RX ADMIN — HYDROMORPHONE HYDROCHLORIDE 0.5 MG: 1 INJECTION, SOLUTION INTRAMUSCULAR; INTRAVENOUS; SUBCUTANEOUS at 11:14

## 2024-12-10 RX ADMIN — ENOXAPARIN SODIUM 40 MG: 100 INJECTION SUBCUTANEOUS at 09:10

## 2024-12-10 RX ADMIN — HYDROMORPHONE HYDROCHLORIDE 0.5 MG: 1 INJECTION, SOLUTION INTRAMUSCULAR; INTRAVENOUS; SUBCUTANEOUS at 17:18

## 2024-12-10 RX ADMIN — HYDROMORPHONE HYDROCHLORIDE 0.5 MG: 1 INJECTION, SOLUTION INTRAMUSCULAR; INTRAVENOUS; SUBCUTANEOUS at 01:46

## 2024-12-10 RX ADMIN — PIPERACILLIN AND TAZOBACTAM 3375 MG: 3; .375 INJECTION, POWDER, LYOPHILIZED, FOR SOLUTION INTRAVENOUS at 01:55

## 2024-12-10 RX ADMIN — PIPERACILLIN AND TAZOBACTAM 3375 MG: 3; .375 INJECTION, POWDER, LYOPHILIZED, FOR SOLUTION INTRAVENOUS at 11:17

## 2024-12-10 ASSESSMENT — PAIN SCALES - GENERAL
PAINLEVEL_OUTOF10: 4
PAINLEVEL_OUTOF10: 2
PAINLEVEL_OUTOF10: 3
PAINLEVEL_OUTOF10: 2
PAINLEVEL_OUTOF10: 3
PAINLEVEL_OUTOF10: 2
PAINLEVEL_OUTOF10: 2
PAINLEVEL_OUTOF10: 3

## 2024-12-10 ASSESSMENT — PAIN DESCRIPTION - LOCATION
LOCATION: ABDOMEN

## 2024-12-10 ASSESSMENT — PAIN - FUNCTIONAL ASSESSMENT
PAIN_FUNCTIONAL_ASSESSMENT: PREVENTS OR INTERFERES SOME ACTIVE ACTIVITIES AND ADLS
PAIN_FUNCTIONAL_ASSESSMENT: ACTIVITIES ARE NOT PREVENTED
PAIN_FUNCTIONAL_ASSESSMENT: ACTIVITIES ARE NOT PREVENTED
PAIN_FUNCTIONAL_ASSESSMENT: PREVENTS OR INTERFERES SOME ACTIVE ACTIVITIES AND ADLS
PAIN_FUNCTIONAL_ASSESSMENT: ACTIVITIES ARE NOT PREVENTED

## 2024-12-10 ASSESSMENT — PAIN DESCRIPTION - PAIN TYPE
TYPE: ACUTE PAIN
TYPE: ACUTE PAIN

## 2024-12-10 ASSESSMENT — PAIN DESCRIPTION - FREQUENCY
FREQUENCY: INTERMITTENT
FREQUENCY: INTERMITTENT

## 2024-12-10 ASSESSMENT — PAIN DESCRIPTION - DESCRIPTORS
DESCRIPTORS: ACHING;OTHER (COMMENT)
DESCRIPTORS: CRAMPING
DESCRIPTORS: ACHING;DISCOMFORT
DESCRIPTORS: ACHING;DISCOMFORT;CRAMPING
DESCRIPTORS: ACHING;DISCOMFORT

## 2024-12-10 ASSESSMENT — PAIN DESCRIPTION - ONSET
ONSET: GRADUAL
ONSET: GRADUAL

## 2024-12-10 ASSESSMENT — PAIN SCALES - WONG BAKER: WONGBAKER_NUMERICALRESPONSE: NO HURT

## 2024-12-10 ASSESSMENT — PAIN DESCRIPTION - DIRECTION: RADIATING_TOWARDS: ACROSS ABD

## 2024-12-10 ASSESSMENT — PAIN DESCRIPTION - ORIENTATION
ORIENTATION: LOWER;MID
ORIENTATION: LOWER;MID
ORIENTATION: RIGHT;LEFT
ORIENTATION: MID
ORIENTATION: RIGHT;LEFT

## 2024-12-10 NOTE — FLOWSHEET NOTE
12/10/24 0808   Vital Signs   Respirations 18   Pain Assessment   Pain Assessment 0-10   Pain Level 4   Patient's Stated Pain Goal 0 - No pain   Pain Location Abdomen   Pain Orientation Mid   Pain Descriptors Aching;Discomfort   Functional Pain Assessment Activities are not prevented   Pain Type Acute pain   Pain Radiating Towards across abd   Pain Frequency Intermittent   Pain Onset Gradual   Non-Pharmaceutical Pain Intervention(s) Declines   Opioid-Induced Sedation   POSS Score 1     Pt assessment complete; see flow sheets. Vitals completed. Meds given per MAR. Pt walked 3 laps in donato. Tolerated well. Wife at bedside updated on care plan. Pt stable.    Richelle Mcclure RN

## 2024-12-10 NOTE — CONSULTS
Comprehensive Nutrition Assessment    Type and Reason for Visit:  Initial, Nutrition support, Consult (TF ordering and management)    Nutrition Recommendations/Plan:   Continue NPO.  Modify TF order, Vital AF (peptide base) with new goal rate of 80 ml/hr. Flush per provider.   Advance rate slowly, watch electrolytes for refeeding syndrome give Severe Malnutrition dx.       Malnutrition Assessment:  Malnutrition Status:  Severe malnutrition (12/10/24 1010)    Context:  Chronic Illness     Findings of the 6 clinical characteristics of malnutrition:  Energy Intake:  75% or less estimated energy requirements for 1 month or longer  Weight Loss:  7.5% over 3 months     Body Fat Loss:  Unable to assess     Muscle Mass Loss:  Unable to assess    Fluid Accumulation:  No fluid accumulation     Strength:  Not Performed    Nutrition Assessment:    Consult \"TF ordering and management\". Pt with PMH of HTN, HLD, celiac, and pancreatitis who presented with abdominal pain r/t recurrent pancreatitis. Pt has had poor PO intakes for the past couple weeks and has been losing wt. NJ was placed yesterday and TF started. Vital AF @ 35 mL/hr currently running per MD recommendations with goal rate of 55 mL/hr. Will adjust TF to better meet nutrtiional needs.    Nutrition Related Findings:    BM 12/1; No edema noted; Glu 138 Wound Type: None       Current Nutrition Intake & Therapies:    Average Meal Intake: NPO  Average Supplements Intake: NPO  Diet NPO  ADULT TUBE FEEDING; Nasojejunal; Peptide Based; Continuous; 35; Yes; 55; Q 24 hours; 55; 30; Q 6 hours  Current Tube Feeding (TF) Orders:  Feeding Route: Nasojejunal  Formula: Peptide Based  Schedule: Continuous  Feeding Regimen: @ 35 mL/hr  Additives/Modulars: None  Water Flushes: 30 q 6 hrs  Current TF Provides: Recommend Vital AF with goal rate of 80 mL/hr x 24 hrs (rate adjusted, ~20 hr run time, off for nrsg care). Flush per provider. Reigmen provides: 1600 mL TV, 1920 kcal, 120 gm 
pathology in the pancreatic head, genu of the pancreas, pancreatic body and pancreatic tail.  Transgastric fine-needle biopsy performed  There was no evidence of significant pathology in the visualized portion of the liver  There was no sign of significant pathology in the gallbladder body.     ERCP 10/23/2024  The major papilla appeared normal  The lower third of the main bile duct was moderately dilated, with an obstruction  Biliary sphincterectomy was performed  The biliary tree was swept and nothing was found  1 biliary stent was placed into the common bile duct      EUS: 11/20/2024-  Impression  Esophagitis  Large amount of food residue in the stomach  600 mL was suctioned out during procedure  Nonbleeding duodenal ulcer biopsied  1 stent was visualized and dissected off carefully the common bile duct   -  Pancreatic parenchymal abnormalities consisting of hyperechoic strands and            diffuse echogenicity were noted in the pancreatic head, genu of the pancreas            and uncinate process of the pancreas.         - This area of the pancreas was very disorganized and inflamed we could see            that there was inflammation in between the pancreas and the duodenal wall we            could also see lots of areas of inflammation in the pancreas itself 1 of these            areas measures right around 3 cm and some we did not see a tumor         -  There was no sign of significant pathology in the pancreatic body.         -  Pancreatic parenchymal abnormalities consisting of cysts were noted in the            pancreatic body.         - This cyst measures 8 mm x 5 mm there was an isoechoic area in the body of            the pancreas it could be a cyst filled with some debris         -  There was no evidence of significant pathology in the visualized portion of            the liver.         -  Hyperechoic material consistent with sludge was visualized            endosonographically in the gallbladder. 
MiaHAROON KWAN CNP    acetaminophen (TYLENOL) tablet 650 mg, 650 mg, Oral, Q6H PRN **OR** acetaminophen (TYLENOL) suppository 650 mg, 650 mg, Rectal, Q6H PRN, EsmejeffreyLory tejada, APRN - CNP    0.9 % sodium chloride infusion, , IntraVENous, Continuous, EsmejeffreyLory tejada, APRN - CNP, Last Rate: 125 mL/hr at 12/08/24 0633, New Bag at 12/08/24 0633    pantoprazole (PROTONIX) 40 mg in sodium chloride (PF) 0.9 % 10 mL injection, 40 mg, IntraVENous, Q12H, Amishellen Lory KWAN, APRN - CNP, 40 mg at 12/08/24 0215    bisacodyl (DULCOLAX) suppository 10 mg, 10 mg, Rectal, Daily, Yoana Lory KWAN, APRN - CNP, 10 mg at 12/08/24 0851    piperacillin-tazobactam (ZOSYN) 3,375 mg in sodium chloride 0.9 % 50 mL IVPB ([id number]), 3,375 mg, IntraVENous, Q8H, Amishellen Lory KWAN, APRN - CNP, Stopped at 12/08/24 0621    HYDROmorphone (DILAUDID) injection 0.5 mg, 0.5 mg, IntraVENous, Q3H PRN, George Collado MD, 0.5 mg at 12/08/24 0840  Allergies   Allergen Reactions    Gluten Meal      celiac     family history includes Diabetes in his mother and sister; Heart Disease in his father.    PHYSICAL EXAM:  /81   Pulse 82   Temp 97.9 °F (36.6 °C) (Oral)   Resp 16   Ht 1.778 m (5' 10\")   Wt 70.7 kg (155 lb 13.8 oz)   SpO2 91%   BMI 22.36 kg/m²   Constitutional: Appears well-developed and well-nourished. Grooming appropriate. No gross deformities. Body mass index is 22.36 kg/m².    Abdominal/wound: Soft, mildly distended, mild tenderness epigastrium    MDM:  Review/order labs: CBC, BMP, lactate, lipase reviewed  Review/order radiology: CT scan reviewed  Review/order other tests: None  Discussion of tests w/ performing: None  Old records/other history provider: Chart review  Coordination/discussion w/ other providers: Discussion and coordination of with Dr. Barros of internal medicine  Independent interpretation of: CT scan showing severe acute on chronic pancreatitis with peripancreatic fluid collections    Tj To MD

## 2024-12-11 LAB
ALBUMIN SERPL-MCNC: 2.5 G/DL (ref 3.4–5)
ALBUMIN/GLOB SERPL: 0.8 {RATIO}
ALP SERPL-CCNC: 108 U/L (ref 40–129)
ALT SERPL-CCNC: 27 U/L (ref 10–40)
ANION GAP SERPL CALCULATED.3IONS-SCNC: 8 MMOL/L (ref 3–16)
AST SERPL-CCNC: 68 U/L (ref 15–37)
BASOPHILS # BLD: 0.02 K/UL (ref 0–0.2)
BASOPHILS NFR BLD: 0 %
BILIRUB SERPL-MCNC: 0.4 MG/DL (ref 0–1)
BUN SERPL-MCNC: 12 MG/DL (ref 7–20)
CALCIUM SERPL-MCNC: 8.6 MG/DL (ref 8.3–10.6)
CHLORIDE SERPL-SCNC: 113 MMOL/L (ref 99–110)
CO2 SERPL-SCNC: 22 MMOL/L (ref 21–32)
CREAT SERPL-MCNC: 0.6 MG/DL (ref 0.8–1.3)
EOSINOPHIL # BLD: 0.26 K/UL (ref 0–0.6)
EOSINOPHILS RELATIVE PERCENT: 2 %
ERYTHROCYTE [DISTWIDTH] IN BLOOD BY AUTOMATED COUNT: 13.6 % (ref 12.4–15.4)
GFR, ESTIMATED: >90 ML/MIN/1.73M2
GLUCOSE BLD-MCNC: 135 MG/DL (ref 70–99)
GLUCOSE BLD-MCNC: 146 MG/DL (ref 70–99)
GLUCOSE SERPL-MCNC: 143 MG/DL (ref 70–99)
HCT VFR BLD AUTO: 31.2 % (ref 40.5–52.5)
HGB BLD-MCNC: 10.3 G/DL (ref 13.5–17.5)
IMM GRANULOCYTES # BLD AUTO: 0.07 K/UL (ref 0–0.5)
IMM GRANULOCYTES NFR BLD: 1 %
LYMPHOCYTES NFR BLD: 2.1 K/UL (ref 1–5.1)
LYMPHOCYTES RELATIVE PERCENT: 15 %
MAGNESIUM SERPL-MCNC: 2.1 MG/DL (ref 1.8–2.4)
MCH RBC QN AUTO: 30 PG (ref 26–34)
MCHC RBC AUTO-ENTMCNC: 33 G/DL (ref 31–36)
MCV RBC AUTO: 91 FL (ref 80–100)
MICROORGANISM SPEC CULT: NORMAL
MICROORGANISM SPEC CULT: NORMAL
MONOCYTES NFR BLD: 0.76 K/UL (ref 0–1.3)
MONOCYTES NFR BLD: 5 %
NEUTROPHILS NFR BLD: 78 %
NEUTS SEG NFR BLD: 11.23 K/UL (ref 1.7–7.7)
PLATELET # BLD AUTO: 278 K/UL (ref 135–450)
PMV BLD AUTO: 10.1 FL (ref 9.4–12.4)
POTASSIUM SERPL-SCNC: 3.3 MMOL/L (ref 3.5–5.1)
PROT SERPL-MCNC: 5.6 G/DL (ref 6.4–8.2)
RBC # BLD AUTO: 3.43 M/UL (ref 4.2–5.9)
SERVICE CMNT-IMP: NORMAL
SERVICE CMNT-IMP: NORMAL
SODIUM SERPL-SCNC: 143 MMOL/L (ref 136–145)
SPECIMEN DESCRIPTION: NORMAL
SPECIMEN DESCRIPTION: NORMAL
WBC OTHER # BLD: 14.4 K/UL (ref 4–11)

## 2024-12-11 PROCEDURE — 36415 COLL VENOUS BLD VENIPUNCTURE: CPT

## 2024-12-11 PROCEDURE — 82962 GLUCOSE BLOOD TEST: CPT

## 2024-12-11 PROCEDURE — 6360000002 HC RX W HCPCS: Performed by: NURSE PRACTITIONER

## 2024-12-11 PROCEDURE — 2060000000 HC ICU INTERMEDIATE R&B

## 2024-12-11 PROCEDURE — 2580000003 HC RX 258: Performed by: NURSE PRACTITIONER

## 2024-12-11 PROCEDURE — 85025 COMPLETE CBC W/AUTO DIFF WBC: CPT

## 2024-12-11 PROCEDURE — 83735 ASSAY OF MAGNESIUM: CPT

## 2024-12-11 PROCEDURE — 99232 SBSQ HOSP IP/OBS MODERATE 35: CPT | Performed by: SURGERY

## 2024-12-11 PROCEDURE — 80053 COMPREHEN METABOLIC PANEL: CPT

## 2024-12-11 PROCEDURE — 6370000000 HC RX 637 (ALT 250 FOR IP): Performed by: NURSE PRACTITIONER

## 2024-12-11 RX ORDER — FINASTERIDE 5 MG/1
5 TABLET, FILM COATED ORAL DAILY
Status: DISCONTINUED | OUTPATIENT
Start: 2024-12-11 | End: 2024-12-18

## 2024-12-11 RX ORDER — ASPIRIN 81 MG/1
81 TABLET ORAL DAILY
Status: DISCONTINUED | OUTPATIENT
Start: 2024-12-11 | End: 2024-12-18

## 2024-12-11 RX ORDER — AMLODIPINE BESYLATE 5 MG/1
5 TABLET ORAL DAILY
Status: DISCONTINUED | OUTPATIENT
Start: 2024-12-11 | End: 2024-12-18

## 2024-12-11 RX ORDER — ATORVASTATIN CALCIUM 40 MG/1
40 TABLET, FILM COATED ORAL NIGHTLY
Status: DISCONTINUED | OUTPATIENT
Start: 2024-12-11 | End: 2024-12-18

## 2024-12-11 RX ORDER — LABETALOL 100 MG/1
50 TABLET, FILM COATED ORAL 2 TIMES DAILY
Status: DISCONTINUED | OUTPATIENT
Start: 2024-12-11 | End: 2024-12-18

## 2024-12-11 RX ADMIN — SODIUM CHLORIDE, PRESERVATIVE FREE 10 ML: 5 INJECTION INTRAVENOUS at 21:03

## 2024-12-11 RX ADMIN — POTASSIUM CHLORIDE 40 MEQ: 1500 TABLET, EXTENDED RELEASE ORAL at 12:37

## 2024-12-11 RX ADMIN — PIPERACILLIN AND TAZOBACTAM 3375 MG: 3; .375 INJECTION, POWDER, LYOPHILIZED, FOR SOLUTION INTRAVENOUS at 02:28

## 2024-12-11 RX ADMIN — ATORVASTATIN CALCIUM 40 MG: 40 TABLET, FILM COATED ORAL at 21:03

## 2024-12-11 RX ADMIN — HYDROMORPHONE HYDROCHLORIDE 0.5 MG: 1 INJECTION, SOLUTION INTRAMUSCULAR; INTRAVENOUS; SUBCUTANEOUS at 16:03

## 2024-12-11 RX ADMIN — LABETALOL HYDROCHLORIDE 50 MG: 100 TABLET, FILM COATED ORAL at 16:10

## 2024-12-11 RX ADMIN — ASPIRIN 81 MG: 81 TABLET, COATED ORAL at 16:10

## 2024-12-11 RX ADMIN — FINASTERIDE 5 MG: 5 TABLET, FILM COATED ORAL at 16:10

## 2024-12-11 RX ADMIN — PIPERACILLIN AND TAZOBACTAM 3375 MG: 3; .375 INJECTION, POWDER, LYOPHILIZED, FOR SOLUTION INTRAVENOUS at 11:22

## 2024-12-11 RX ADMIN — HYDROMORPHONE HYDROCHLORIDE 0.5 MG: 1 INJECTION, SOLUTION INTRAMUSCULAR; INTRAVENOUS; SUBCUTANEOUS at 23:00

## 2024-12-11 RX ADMIN — PANTOPRAZOLE SODIUM 40 MG: 40 INJECTION, POWDER, FOR SOLUTION INTRAVENOUS at 02:26

## 2024-12-11 RX ADMIN — HYDROMORPHONE HYDROCHLORIDE 0.5 MG: 1 INJECTION, SOLUTION INTRAMUSCULAR; INTRAVENOUS; SUBCUTANEOUS at 04:58

## 2024-12-11 RX ADMIN — HYDROMORPHONE HYDROCHLORIDE 0.5 MG: 1 INJECTION, SOLUTION INTRAMUSCULAR; INTRAVENOUS; SUBCUTANEOUS at 02:25

## 2024-12-11 RX ADMIN — PANTOPRAZOLE SODIUM 40 MG: 40 INJECTION, POWDER, FOR SOLUTION INTRAVENOUS at 13:02

## 2024-12-11 RX ADMIN — ENOXAPARIN SODIUM 40 MG: 100 INJECTION SUBCUTANEOUS at 08:28

## 2024-12-11 ASSESSMENT — PAIN DESCRIPTION - DESCRIPTORS
DESCRIPTORS: ACHING;CRAMPING
DESCRIPTORS: ACHING;DISCOMFORT
DESCRIPTORS: CRAMPING
DESCRIPTORS: CRAMPING;ACHING

## 2024-12-11 ASSESSMENT — PAIN DESCRIPTION - LOCATION
LOCATION: ABDOMEN

## 2024-12-11 ASSESSMENT — PAIN SCALES - GENERAL
PAINLEVEL_OUTOF10: 0
PAINLEVEL_OUTOF10: 3
PAINLEVEL_OUTOF10: 6
PAINLEVEL_OUTOF10: 7
PAINLEVEL_OUTOF10: 4

## 2024-12-11 ASSESSMENT — PAIN DESCRIPTION - ORIENTATION
ORIENTATION: MID
ORIENTATION: MID;RIGHT

## 2024-12-11 ASSESSMENT — PAIN DESCRIPTION - ONSET: ONSET: GRADUAL

## 2024-12-11 ASSESSMENT — PAIN SCALES - WONG BAKER
WONGBAKER_NUMERICALRESPONSE: NO HURT
WONGBAKER_NUMERICALRESPONSE: NO HURT

## 2024-12-11 ASSESSMENT — PAIN DESCRIPTION - PAIN TYPE: TYPE: ACUTE PAIN

## 2024-12-11 ASSESSMENT — PAIN - FUNCTIONAL ASSESSMENT: PAIN_FUNCTIONAL_ASSESSMENT: PREVENTS OR INTERFERES SOME ACTIVE ACTIVITIES AND ADLS

## 2024-12-11 ASSESSMENT — PAIN DESCRIPTION - FREQUENCY: FREQUENCY: INTERMITTENT

## 2024-12-11 NOTE — CARE COORDINATION
[employer] has checked home TF benefits and are as follows:    Pump Rental is covered at $150 per month.    Supplies included: 9 dollars per day.    Home health provider is [facility]

## 2024-12-11 NOTE — CARE COORDINATION
Discharge Planning Note:    Chart reviewed and pt has possible Tube Feeds/J-tube needs upon discharge.  SW/CM contacted Sergio  with [employer] requesting home tube feed benefits check.  Sergio to report back with benefit allowances.

## 2024-12-12 ENCOUNTER — APPOINTMENT (OUTPATIENT)
Age: 74
DRG: 853 | End: 2024-12-12
Payer: MEDICARE

## 2024-12-12 LAB
ANION GAP SERPL CALCULATED.3IONS-SCNC: 9 MMOL/L (ref 3–16)
BASOPHILS # BLD: 0.01 K/UL (ref 0–0.2)
BASOPHILS NFR BLD: 0 %
BUN SERPL-MCNC: 16 MG/DL (ref 7–20)
CALCIUM SERPL-MCNC: 8.5 MG/DL (ref 8.3–10.6)
CHLORIDE SERPL-SCNC: 113 MMOL/L (ref 99–110)
CO2 SERPL-SCNC: 20 MMOL/L (ref 21–32)
CREAT SERPL-MCNC: 0.5 MG/DL (ref 0.8–1.3)
EOSINOPHIL # BLD: 0.24 K/UL (ref 0–0.6)
EOSINOPHILS RELATIVE PERCENT: 2 %
ERYTHROCYTE [DISTWIDTH] IN BLOOD BY AUTOMATED COUNT: 13.7 % (ref 12.4–15.4)
GFR, ESTIMATED: >90 ML/MIN/1.73M2
GLUCOSE BLD-MCNC: 102 MG/DL (ref 70–99)
GLUCOSE BLD-MCNC: 113 MG/DL (ref 70–99)
GLUCOSE BLD-MCNC: 119 MG/DL (ref 70–99)
GLUCOSE BLD-MCNC: 121 MG/DL (ref 70–99)
GLUCOSE BLD-MCNC: 132 MG/DL (ref 70–99)
GLUCOSE SERPL-MCNC: 138 MG/DL (ref 70–99)
HCT VFR BLD AUTO: 31.3 % (ref 40.5–52.5)
HGB BLD-MCNC: 10.5 G/DL (ref 13.5–17.5)
IMM GRANULOCYTES # BLD AUTO: 0.08 K/UL (ref 0–0.5)
IMM GRANULOCYTES NFR BLD: 1 %
LYMPHOCYTES NFR BLD: 2.48 K/UL (ref 1–5.1)
LYMPHOCYTES RELATIVE PERCENT: 15 %
MCH RBC QN AUTO: 30.3 PG (ref 26–34)
MCHC RBC AUTO-ENTMCNC: 33.5 G/DL (ref 31–36)
MCV RBC AUTO: 90.2 FL (ref 80–100)
MONOCYTES NFR BLD: 0.71 K/UL (ref 0–1.3)
MONOCYTES NFR BLD: 4 %
NEUTROPHILS NFR BLD: 78 %
NEUTS SEG NFR BLD: 12.66 K/UL (ref 1.7–7.7)
PLATELET # BLD AUTO: 307 K/UL (ref 135–450)
PMV BLD AUTO: 9.9 FL (ref 9.4–12.4)
POTASSIUM SERPL-SCNC: 3.6 MMOL/L (ref 3.5–5.1)
RBC # BLD AUTO: 3.47 M/UL (ref 4.2–5.9)
SODIUM SERPL-SCNC: 142 MMOL/L (ref 136–145)
WBC OTHER # BLD: 16.2 K/UL (ref 4–11)

## 2024-12-12 PROCEDURE — 2580000003 HC RX 258: Performed by: NURSE PRACTITIONER

## 2024-12-12 PROCEDURE — 82962 GLUCOSE BLOOD TEST: CPT

## 2024-12-12 PROCEDURE — 6360000002 HC RX W HCPCS: Performed by: NURSE PRACTITIONER

## 2024-12-12 PROCEDURE — 6360000002 HC RX W HCPCS: Performed by: INTERNAL MEDICINE

## 2024-12-12 PROCEDURE — 80048 BASIC METABOLIC PNL TOTAL CA: CPT

## 2024-12-12 PROCEDURE — 2060000000 HC ICU INTERMEDIATE R&B

## 2024-12-12 PROCEDURE — 36415 COLL VENOUS BLD VENIPUNCTURE: CPT

## 2024-12-12 PROCEDURE — 6370000000 HC RX 637 (ALT 250 FOR IP): Performed by: NURSE PRACTITIONER

## 2024-12-12 PROCEDURE — 85025 COMPLETE CBC W/AUTO DIFF WBC: CPT

## 2024-12-12 PROCEDURE — 74019 RADEX ABDOMEN 2 VIEWS: CPT

## 2024-12-12 RX ORDER — SODIUM PHOSPHATE, DIBASIC AND SODIUM PHOSPHATE, MONOBASIC 7; 19 G/230ML; G/230ML
ENEMA RECTAL ONCE
Status: DISCONTINUED | OUTPATIENT
Start: 2024-12-12 | End: 2024-12-12

## 2024-12-12 RX ORDER — METOCLOPRAMIDE HYDROCHLORIDE 5 MG/ML
5 INJECTION INTRAMUSCULAR; INTRAVENOUS EVERY 6 HOURS
Status: DISCONTINUED | OUTPATIENT
Start: 2024-12-12 | End: 2024-12-18 | Stop reason: HOSPADM

## 2024-12-12 RX ADMIN — METOCLOPRAMIDE 5 MG: 5 INJECTION, SOLUTION INTRAMUSCULAR; INTRAVENOUS at 14:21

## 2024-12-12 RX ADMIN — PANTOPRAZOLE SODIUM 40 MG: 40 INJECTION, POWDER, FOR SOLUTION INTRAVENOUS at 14:21

## 2024-12-12 RX ADMIN — METOCLOPRAMIDE 5 MG: 5 INJECTION, SOLUTION INTRAMUSCULAR; INTRAVENOUS at 20:58

## 2024-12-12 RX ADMIN — BISACODYL 10 MG: 10 SUPPOSITORY RECTAL at 14:22

## 2024-12-12 RX ADMIN — HYDROMORPHONE HYDROCHLORIDE 0.5 MG: 1 INJECTION, SOLUTION INTRAMUSCULAR; INTRAVENOUS; SUBCUTANEOUS at 23:57

## 2024-12-12 RX ADMIN — SODIUM CHLORIDE, PRESERVATIVE FREE 10 ML: 5 INJECTION INTRAVENOUS at 21:01

## 2024-12-12 RX ADMIN — PANTOPRAZOLE SODIUM 40 MG: 40 INJECTION, POWDER, FOR SOLUTION INTRAVENOUS at 23:58

## 2024-12-12 RX ADMIN — HYDROMORPHONE HYDROCHLORIDE 0.5 MG: 1 INJECTION, SOLUTION INTRAMUSCULAR; INTRAVENOUS; SUBCUTANEOUS at 09:30

## 2024-12-12 RX ADMIN — PANTOPRAZOLE SODIUM 40 MG: 40 INJECTION, POWDER, FOR SOLUTION INTRAVENOUS at 02:17

## 2024-12-12 RX ADMIN — ENOXAPARIN SODIUM 40 MG: 100 INJECTION SUBCUTANEOUS at 09:30

## 2024-12-12 ASSESSMENT — PAIN DESCRIPTION - FREQUENCY: FREQUENCY: CONTINUOUS

## 2024-12-12 ASSESSMENT — PAIN SCALES - GENERAL
PAINLEVEL_OUTOF10: 6
PAINLEVEL_OUTOF10: 0
PAINLEVEL_OUTOF10: 6
PAINLEVEL_OUTOF10: 0

## 2024-12-12 ASSESSMENT — PAIN DESCRIPTION - ORIENTATION
ORIENTATION: MID
ORIENTATION: MID

## 2024-12-12 ASSESSMENT — PAIN DESCRIPTION - LOCATION
LOCATION: ABDOMEN
LOCATION: ABDOMEN

## 2024-12-12 ASSESSMENT — PAIN DESCRIPTION - PAIN TYPE: TYPE: ACUTE PAIN

## 2024-12-12 ASSESSMENT — PAIN - FUNCTIONAL ASSESSMENT: PAIN_FUNCTIONAL_ASSESSMENT: PREVENTS OR INTERFERES SOME ACTIVE ACTIVITIES AND ADLS

## 2024-12-12 ASSESSMENT — PAIN DESCRIPTION - DESCRIPTORS
DESCRIPTORS: ACHING;CRAMPING
DESCRIPTORS: ACHING;CRAMPING

## 2024-12-12 NOTE — CARE COORDINATION
Discharge Planning Note:    Sergio from [facility] educated patient and pt wife at bedside on home TF.     Sergio stated to contact him when patient is discharged, they can deliver same day to patients home.       TF on hold at this time due to abdominal distention.  Abd xray pending.

## 2024-12-13 LAB
ANION GAP SERPL CALCULATED.3IONS-SCNC: 8 MMOL/L (ref 3–16)
BUN SERPL-MCNC: 13 MG/DL (ref 7–20)
CALCIUM SERPL-MCNC: 8.5 MG/DL (ref 8.3–10.6)
CHLORIDE SERPL-SCNC: 111 MMOL/L (ref 99–110)
CO2 SERPL-SCNC: 23 MMOL/L (ref 21–32)
CREAT SERPL-MCNC: 0.5 MG/DL (ref 0.8–1.3)
GFR, ESTIMATED: >90 ML/MIN/1.73M2
GLUCOSE BLD-MCNC: 106 MG/DL (ref 70–99)
GLUCOSE BLD-MCNC: 114 MG/DL (ref 70–99)
GLUCOSE BLD-MCNC: 116 MG/DL (ref 70–99)
GLUCOSE BLD-MCNC: 121 MG/DL (ref 70–99)
GLUCOSE SERPL-MCNC: 118 MG/DL (ref 70–99)
POTASSIUM SERPL-SCNC: 3.7 MMOL/L (ref 3.5–5.1)
SODIUM SERPL-SCNC: 141 MMOL/L (ref 136–145)

## 2024-12-13 PROCEDURE — 6360000002 HC RX W HCPCS: Performed by: NURSE PRACTITIONER

## 2024-12-13 PROCEDURE — 36415 COLL VENOUS BLD VENIPUNCTURE: CPT

## 2024-12-13 PROCEDURE — 6370000000 HC RX 637 (ALT 250 FOR IP): Performed by: NURSE PRACTITIONER

## 2024-12-13 PROCEDURE — 2580000003 HC RX 258: Performed by: NURSE PRACTITIONER

## 2024-12-13 PROCEDURE — 2060000000 HC ICU INTERMEDIATE R&B

## 2024-12-13 PROCEDURE — 82962 GLUCOSE BLOOD TEST: CPT

## 2024-12-13 PROCEDURE — 6360000002 HC RX W HCPCS: Performed by: INTERNAL MEDICINE

## 2024-12-13 PROCEDURE — 80048 BASIC METABOLIC PNL TOTAL CA: CPT

## 2024-12-13 RX ADMIN — HYDROMORPHONE HYDROCHLORIDE 0.5 MG: 1 INJECTION, SOLUTION INTRAMUSCULAR; INTRAVENOUS; SUBCUTANEOUS at 18:52

## 2024-12-13 RX ADMIN — METOCLOPRAMIDE 5 MG: 5 INJECTION, SOLUTION INTRAMUSCULAR; INTRAVENOUS at 15:52

## 2024-12-13 RX ADMIN — METOCLOPRAMIDE 5 MG: 5 INJECTION, SOLUTION INTRAMUSCULAR; INTRAVENOUS at 03:14

## 2024-12-13 RX ADMIN — ENOXAPARIN SODIUM 40 MG: 100 INJECTION SUBCUTANEOUS at 09:09

## 2024-12-13 RX ADMIN — METOCLOPRAMIDE 5 MG: 5 INJECTION, SOLUTION INTRAMUSCULAR; INTRAVENOUS at 21:42

## 2024-12-13 RX ADMIN — HYDROMORPHONE HYDROCHLORIDE 0.5 MG: 1 INJECTION, SOLUTION INTRAMUSCULAR; INTRAVENOUS; SUBCUTANEOUS at 12:40

## 2024-12-13 RX ADMIN — BISACODYL 10 MG: 10 SUPPOSITORY RECTAL at 09:10

## 2024-12-13 RX ADMIN — PANTOPRAZOLE SODIUM 40 MG: 40 INJECTION, POWDER, FOR SOLUTION INTRAVENOUS at 12:38

## 2024-12-13 RX ADMIN — SODIUM CHLORIDE, PRESERVATIVE FREE 10 ML: 5 INJECTION INTRAVENOUS at 21:43

## 2024-12-13 RX ADMIN — METOCLOPRAMIDE 5 MG: 5 INJECTION, SOLUTION INTRAMUSCULAR; INTRAVENOUS at 09:09

## 2024-12-13 RX ADMIN — HYDROMORPHONE HYDROCHLORIDE 0.5 MG: 1 INJECTION, SOLUTION INTRAMUSCULAR; INTRAVENOUS; SUBCUTANEOUS at 03:14

## 2024-12-13 ASSESSMENT — PAIN DESCRIPTION - DESCRIPTORS
DESCRIPTORS: ACHING;CRAMPING
DESCRIPTORS: ACHING;CRAMPING
DESCRIPTORS: CRAMPING
DESCRIPTORS: ACHING;CRAMPING
DESCRIPTORS: ACHING;CRAMPING

## 2024-12-13 ASSESSMENT — PAIN SCALES - GENERAL
PAINLEVEL_OUTOF10: 3
PAINLEVEL_OUTOF10: 7
PAINLEVEL_OUTOF10: 0
PAINLEVEL_OUTOF10: 3
PAINLEVEL_OUTOF10: 6
PAINLEVEL_OUTOF10: 5

## 2024-12-13 ASSESSMENT — PAIN DESCRIPTION - ONSET: ONSET: GRADUAL

## 2024-12-13 ASSESSMENT — PAIN DESCRIPTION - ORIENTATION
ORIENTATION: MID
ORIENTATION: MID;LOWER
ORIENTATION: MID

## 2024-12-13 ASSESSMENT — PAIN DESCRIPTION - PAIN TYPE
TYPE: ACUTE PAIN
TYPE: ACUTE PAIN

## 2024-12-13 ASSESSMENT — PAIN DESCRIPTION - LOCATION
LOCATION: ABDOMEN

## 2024-12-13 ASSESSMENT — PAIN DESCRIPTION - FREQUENCY: FREQUENCY: INTERMITTENT

## 2024-12-13 ASSESSMENT — PAIN - FUNCTIONAL ASSESSMENT
PAIN_FUNCTIONAL_ASSESSMENT: ACTIVITIES ARE NOT PREVENTED
PAIN_FUNCTIONAL_ASSESSMENT: PREVENTS OR INTERFERES SOME ACTIVE ACTIVITIES AND ADLS
PAIN_FUNCTIONAL_ASSESSMENT: ACTIVITIES ARE NOT PREVENTED
PAIN_FUNCTIONAL_ASSESSMENT: ACTIVITIES ARE NOT PREVENTED

## 2024-12-14 LAB
ANION GAP SERPL CALCULATED.3IONS-SCNC: 10 MMOL/L (ref 3–16)
BASOPHILS # BLD: 0.01 K/UL (ref 0–0.2)
BASOPHILS NFR BLD: 0 %
BUN SERPL-MCNC: 13 MG/DL (ref 7–20)
CALCIUM SERPL-MCNC: 8.6 MG/DL (ref 8.3–10.6)
CHLORIDE SERPL-SCNC: 109 MMOL/L (ref 99–110)
CO2 SERPL-SCNC: 23 MMOL/L (ref 21–32)
CREAT SERPL-MCNC: 0.5 MG/DL (ref 0.8–1.3)
EOSINOPHIL # BLD: 0.19 K/UL (ref 0–0.6)
EOSINOPHILS RELATIVE PERCENT: 2 %
ERYTHROCYTE [DISTWIDTH] IN BLOOD BY AUTOMATED COUNT: 14.4 % (ref 12.4–15.4)
GFR, ESTIMATED: >90 ML/MIN/1.73M2
GLUCOSE BLD-MCNC: 113 MG/DL (ref 70–99)
GLUCOSE BLD-MCNC: 124 MG/DL (ref 70–99)
GLUCOSE BLD-MCNC: 126 MG/DL (ref 70–99)
GLUCOSE BLD-MCNC: 137 MG/DL (ref 70–99)
GLUCOSE BLD-MCNC: 138 MG/DL (ref 70–99)
GLUCOSE SERPL-MCNC: 136 MG/DL (ref 70–99)
HCT VFR BLD AUTO: 30 % (ref 40.5–52.5)
HGB BLD-MCNC: 9.9 G/DL (ref 13.5–17.5)
IMM GRANULOCYTES # BLD AUTO: 0.05 K/UL (ref 0–0.5)
IMM GRANULOCYTES NFR BLD: 1 %
LYMPHOCYTES NFR BLD: 2 K/UL (ref 1–5.1)
LYMPHOCYTES RELATIVE PERCENT: 18 %
MCH RBC QN AUTO: 29.8 PG (ref 26–34)
MCHC RBC AUTO-ENTMCNC: 33 G/DL (ref 31–36)
MCV RBC AUTO: 90.4 FL (ref 80–100)
MONOCYTES NFR BLD: 0.59 K/UL (ref 0–1.3)
MONOCYTES NFR BLD: 5 %
NEUTROPHILS NFR BLD: 74 %
NEUTS SEG NFR BLD: 8.24 K/UL (ref 1.7–7.7)
PLATELET # BLD AUTO: 274 K/UL (ref 135–450)
PMV BLD AUTO: 10.4 FL (ref 9.4–12.4)
POTASSIUM SERPL-SCNC: 3.9 MMOL/L (ref 3.5–5.1)
RBC # BLD AUTO: 3.32 M/UL (ref 4.2–5.9)
SODIUM SERPL-SCNC: 142 MMOL/L (ref 136–145)
WBC OTHER # BLD: 11.1 K/UL (ref 4–11)

## 2024-12-14 PROCEDURE — 36415 COLL VENOUS BLD VENIPUNCTURE: CPT

## 2024-12-14 PROCEDURE — 2580000003 HC RX 258: Performed by: NURSE PRACTITIONER

## 2024-12-14 PROCEDURE — 85025 COMPLETE CBC W/AUTO DIFF WBC: CPT

## 2024-12-14 PROCEDURE — 6360000002 HC RX W HCPCS: Performed by: NURSE PRACTITIONER

## 2024-12-14 PROCEDURE — 6370000000 HC RX 637 (ALT 250 FOR IP): Performed by: NURSE PRACTITIONER

## 2024-12-14 PROCEDURE — 6360000002 HC RX W HCPCS: Performed by: INTERNAL MEDICINE

## 2024-12-14 PROCEDURE — 2060000000 HC ICU INTERMEDIATE R&B

## 2024-12-14 PROCEDURE — 80048 BASIC METABOLIC PNL TOTAL CA: CPT

## 2024-12-14 PROCEDURE — 82962 GLUCOSE BLOOD TEST: CPT

## 2024-12-14 RX ADMIN — ENOXAPARIN SODIUM 40 MG: 100 INJECTION SUBCUTANEOUS at 09:09

## 2024-12-14 RX ADMIN — METOCLOPRAMIDE 5 MG: 5 INJECTION, SOLUTION INTRAMUSCULAR; INTRAVENOUS at 02:51

## 2024-12-14 RX ADMIN — HYDROMORPHONE HYDROCHLORIDE 0.5 MG: 1 INJECTION, SOLUTION INTRAMUSCULAR; INTRAVENOUS; SUBCUTANEOUS at 15:14

## 2024-12-14 RX ADMIN — HYDROMORPHONE HYDROCHLORIDE 0.5 MG: 1 INJECTION, SOLUTION INTRAMUSCULAR; INTRAVENOUS; SUBCUTANEOUS at 18:37

## 2024-12-14 RX ADMIN — PANTOPRAZOLE SODIUM 40 MG: 40 INJECTION, POWDER, FOR SOLUTION INTRAVENOUS at 00:47

## 2024-12-14 RX ADMIN — SODIUM CHLORIDE, PRESERVATIVE FREE 10 ML: 5 INJECTION INTRAVENOUS at 09:13

## 2024-12-14 RX ADMIN — HYDROMORPHONE HYDROCHLORIDE 0.5 MG: 1 INJECTION, SOLUTION INTRAMUSCULAR; INTRAVENOUS; SUBCUTANEOUS at 21:39

## 2024-12-14 RX ADMIN — METOCLOPRAMIDE 5 MG: 5 INJECTION, SOLUTION INTRAMUSCULAR; INTRAVENOUS at 21:40

## 2024-12-14 RX ADMIN — METOCLOPRAMIDE 5 MG: 5 INJECTION, SOLUTION INTRAMUSCULAR; INTRAVENOUS at 15:08

## 2024-12-14 RX ADMIN — METOCLOPRAMIDE 5 MG: 5 INJECTION, SOLUTION INTRAMUSCULAR; INTRAVENOUS at 09:09

## 2024-12-14 RX ADMIN — PANTOPRAZOLE SODIUM 40 MG: 40 INJECTION, POWDER, FOR SOLUTION INTRAVENOUS at 15:01

## 2024-12-14 RX ADMIN — BISACODYL 10 MG: 10 SUPPOSITORY RECTAL at 09:09

## 2024-12-14 RX ADMIN — SODIUM CHLORIDE, PRESERVATIVE FREE 10 ML: 5 INJECTION INTRAVENOUS at 21:41

## 2024-12-14 RX ADMIN — HYDROMORPHONE HYDROCHLORIDE 0.5 MG: 1 INJECTION, SOLUTION INTRAMUSCULAR; INTRAVENOUS; SUBCUTANEOUS at 04:14

## 2024-12-14 ASSESSMENT — PAIN - FUNCTIONAL ASSESSMENT
PAIN_FUNCTIONAL_ASSESSMENT: ACTIVITIES ARE NOT PREVENTED
PAIN_FUNCTIONAL_ASSESSMENT: PREVENTS OR INTERFERES SOME ACTIVE ACTIVITIES AND ADLS
PAIN_FUNCTIONAL_ASSESSMENT: PREVENTS OR INTERFERES SOME ACTIVE ACTIVITIES AND ADLS

## 2024-12-14 ASSESSMENT — PAIN DESCRIPTION - FREQUENCY
FREQUENCY: CONTINUOUS
FREQUENCY: INTERMITTENT
FREQUENCY: CONTINUOUS
FREQUENCY: INTERMITTENT

## 2024-12-14 ASSESSMENT — PAIN DESCRIPTION - PAIN TYPE
TYPE: ACUTE PAIN

## 2024-12-14 ASSESSMENT — PAIN DESCRIPTION - ORIENTATION
ORIENTATION: MID
ORIENTATION: RIGHT;LEFT
ORIENTATION: RIGHT;LEFT;MID;LOWER
ORIENTATION: RIGHT

## 2024-12-14 ASSESSMENT — PAIN DESCRIPTION - LOCATION
LOCATION: ABDOMEN

## 2024-12-14 ASSESSMENT — PAIN DESCRIPTION - DESCRIPTORS
DESCRIPTORS: ACHING;CRAMPING;DISCOMFORT
DESCRIPTORS: ACHING
DESCRIPTORS: ACHING;CRAMPING

## 2024-12-14 ASSESSMENT — PAIN SCALES - GENERAL
PAINLEVEL_OUTOF10: 3
PAINLEVEL_OUTOF10: 0
PAINLEVEL_OUTOF10: 0
PAINLEVEL_OUTOF10: 3
PAINLEVEL_OUTOF10: 0
PAINLEVEL_OUTOF10: 1

## 2024-12-14 ASSESSMENT — PAIN SCALES - WONG BAKER
WONGBAKER_NUMERICALRESPONSE: NO HURT
WONGBAKER_NUMERICALRESPONSE: HURTS A LITTLE BIT

## 2024-12-14 ASSESSMENT — PAIN DESCRIPTION - ONSET
ONSET: GRADUAL

## 2024-12-15 LAB
ANION GAP SERPL CALCULATED.3IONS-SCNC: 6 MMOL/L (ref 3–16)
BUN SERPL-MCNC: 13 MG/DL (ref 7–20)
CALCIUM SERPL-MCNC: 8.7 MG/DL (ref 8.3–10.6)
CHLORIDE SERPL-SCNC: 109 MMOL/L (ref 99–110)
CO2 SERPL-SCNC: 24 MMOL/L (ref 21–32)
CREAT SERPL-MCNC: 0.6 MG/DL (ref 0.8–1.3)
GFR, ESTIMATED: >90 ML/MIN/1.73M2
GLUCOSE BLD-MCNC: 106 MG/DL (ref 70–99)
GLUCOSE BLD-MCNC: 119 MG/DL (ref 70–99)
GLUCOSE BLD-MCNC: 121 MG/DL (ref 70–99)
GLUCOSE SERPL-MCNC: 143 MG/DL (ref 70–99)
POTASSIUM SERPL-SCNC: 3.7 MMOL/L (ref 3.5–5.1)
SODIUM SERPL-SCNC: 140 MMOL/L (ref 136–145)

## 2024-12-15 PROCEDURE — 6360000002 HC RX W HCPCS: Performed by: INTERNAL MEDICINE

## 2024-12-15 PROCEDURE — 2060000000 HC ICU INTERMEDIATE R&B

## 2024-12-15 PROCEDURE — 2580000003 HC RX 258: Performed by: NURSE PRACTITIONER

## 2024-12-15 PROCEDURE — 80048 BASIC METABOLIC PNL TOTAL CA: CPT

## 2024-12-15 PROCEDURE — 6360000002 HC RX W HCPCS: Performed by: NURSE PRACTITIONER

## 2024-12-15 PROCEDURE — 6370000000 HC RX 637 (ALT 250 FOR IP): Performed by: NURSE PRACTITIONER

## 2024-12-15 PROCEDURE — 82962 GLUCOSE BLOOD TEST: CPT

## 2024-12-15 PROCEDURE — 36415 COLL VENOUS BLD VENIPUNCTURE: CPT

## 2024-12-15 RX ADMIN — METOCLOPRAMIDE 5 MG: 5 INJECTION, SOLUTION INTRAMUSCULAR; INTRAVENOUS at 15:53

## 2024-12-15 RX ADMIN — ENOXAPARIN SODIUM 40 MG: 100 INJECTION SUBCUTANEOUS at 09:41

## 2024-12-15 RX ADMIN — SODIUM CHLORIDE, PRESERVATIVE FREE 10 ML: 5 INJECTION INTRAVENOUS at 09:45

## 2024-12-15 RX ADMIN — HYDROMORPHONE HYDROCHLORIDE 0.5 MG: 1 INJECTION, SOLUTION INTRAMUSCULAR; INTRAVENOUS; SUBCUTANEOUS at 16:14

## 2024-12-15 RX ADMIN — BISACODYL 10 MG: 10 SUPPOSITORY RECTAL at 09:41

## 2024-12-15 RX ADMIN — HYDROMORPHONE HYDROCHLORIDE 0.5 MG: 1 INJECTION, SOLUTION INTRAMUSCULAR; INTRAVENOUS; SUBCUTANEOUS at 22:04

## 2024-12-15 RX ADMIN — PANTOPRAZOLE SODIUM 40 MG: 40 INJECTION, POWDER, FOR SOLUTION INTRAVENOUS at 13:08

## 2024-12-15 RX ADMIN — METOCLOPRAMIDE 5 MG: 5 INJECTION, SOLUTION INTRAMUSCULAR; INTRAVENOUS at 22:04

## 2024-12-15 RX ADMIN — SODIUM CHLORIDE, PRESERVATIVE FREE 10 ML: 5 INJECTION INTRAVENOUS at 22:05

## 2024-12-15 RX ADMIN — METOCLOPRAMIDE 5 MG: 5 INJECTION, SOLUTION INTRAMUSCULAR; INTRAVENOUS at 03:19

## 2024-12-15 RX ADMIN — HYDROMORPHONE HYDROCHLORIDE 0.5 MG: 1 INJECTION, SOLUTION INTRAMUSCULAR; INTRAVENOUS; SUBCUTANEOUS at 03:19

## 2024-12-15 RX ADMIN — METOCLOPRAMIDE 5 MG: 5 INJECTION, SOLUTION INTRAMUSCULAR; INTRAVENOUS at 09:41

## 2024-12-15 RX ADMIN — PANTOPRAZOLE SODIUM 40 MG: 40 INJECTION, POWDER, FOR SOLUTION INTRAVENOUS at 03:18

## 2024-12-15 RX ADMIN — HYDROMORPHONE HYDROCHLORIDE 0.5 MG: 1 INJECTION, SOLUTION INTRAMUSCULAR; INTRAVENOUS; SUBCUTANEOUS at 13:07

## 2024-12-15 ASSESSMENT — PAIN DESCRIPTION - DESCRIPTORS
DESCRIPTORS: CRAMPING
DESCRIPTORS: CRAMPING;DULL
DESCRIPTORS: ACHING;DISCOMFORT

## 2024-12-15 ASSESSMENT — PAIN DESCRIPTION - FREQUENCY: FREQUENCY: INTERMITTENT

## 2024-12-15 ASSESSMENT — PAIN DESCRIPTION - LOCATION
LOCATION: ABDOMEN

## 2024-12-15 ASSESSMENT — PAIN - FUNCTIONAL ASSESSMENT
PAIN_FUNCTIONAL_ASSESSMENT: PREVENTS OR INTERFERES SOME ACTIVE ACTIVITIES AND ADLS
PAIN_FUNCTIONAL_ASSESSMENT: ACTIVITIES ARE NOT PREVENTED

## 2024-12-15 ASSESSMENT — PAIN DESCRIPTION - PAIN TYPE: TYPE: ACUTE PAIN

## 2024-12-15 ASSESSMENT — PAIN SCALES - GENERAL
PAINLEVEL_OUTOF10: 4
PAINLEVEL_OUTOF10: 0
PAINLEVEL_OUTOF10: 4
PAINLEVEL_OUTOF10: 7
PAINLEVEL_OUTOF10: 3

## 2024-12-15 ASSESSMENT — PAIN DESCRIPTION - ONSET: ONSET: GRADUAL

## 2024-12-15 ASSESSMENT — PAIN DESCRIPTION - ORIENTATION
ORIENTATION: MID
ORIENTATION: RIGHT;LOWER;MID
ORIENTATION: LEFT;MID;RIGHT;UPPER

## 2024-12-15 NOTE — FLOWSHEET NOTE
Pt slept off and on overnight. Pain level usually 3/10. Refused 0630 Dilaudid. TF infusing at 50 ml/hr and pt has tolerated well. UAL with steady gait. Very pleasant. Uses call light as needed. VSS. Call light in reach.

## 2024-12-16 ENCOUNTER — APPOINTMENT (OUTPATIENT)
Age: 74
DRG: 853 | End: 2024-12-16
Payer: MEDICARE

## 2024-12-16 LAB
ANION GAP SERPL CALCULATED.3IONS-SCNC: 8 MMOL/L (ref 3–16)
BASOPHILS # BLD: 0.02 K/UL (ref 0–0.2)
BASOPHILS NFR BLD: 0 %
BUN SERPL-MCNC: 15 MG/DL (ref 7–20)
CALCIUM SERPL-MCNC: 8.8 MG/DL (ref 8.3–10.6)
CHLORIDE SERPL-SCNC: 110 MMOL/L (ref 99–110)
CO2 SERPL-SCNC: 23 MMOL/L (ref 21–32)
CREAT SERPL-MCNC: 0.6 MG/DL (ref 0.8–1.3)
EOSINOPHIL # BLD: 0.22 K/UL (ref 0–0.6)
EOSINOPHILS RELATIVE PERCENT: 2 %
ERYTHROCYTE [DISTWIDTH] IN BLOOD BY AUTOMATED COUNT: 14.7 % (ref 12.4–15.4)
GFR, ESTIMATED: >90 ML/MIN/1.73M2
GLUCOSE BLD-MCNC: 118 MG/DL (ref 70–99)
GLUCOSE BLD-MCNC: 123 MG/DL (ref 70–99)
GLUCOSE BLD-MCNC: 126 MG/DL (ref 70–99)
GLUCOSE BLD-MCNC: 127 MG/DL (ref 70–99)
GLUCOSE SERPL-MCNC: 125 MG/DL (ref 70–99)
HCT VFR BLD AUTO: 31 % (ref 40.5–52.5)
HGB BLD-MCNC: 10.2 G/DL (ref 13.5–17.5)
IMM GRANULOCYTES # BLD AUTO: 0.04 K/UL (ref 0–0.5)
IMM GRANULOCYTES NFR BLD: 0 %
LYMPHOCYTES NFR BLD: 2.95 K/UL (ref 1–5.1)
LYMPHOCYTES RELATIVE PERCENT: 21 %
MCH RBC QN AUTO: 29.6 PG (ref 26–34)
MCHC RBC AUTO-ENTMCNC: 32.9 G/DL (ref 31–36)
MCV RBC AUTO: 89.9 FL (ref 80–100)
MONOCYTES NFR BLD: 0.76 K/UL (ref 0–1.3)
MONOCYTES NFR BLD: 5 %
NEUTROPHILS NFR BLD: 72 %
NEUTS SEG NFR BLD: 10.28 K/UL (ref 1.7–7.7)
PLATELET # BLD AUTO: 277 K/UL (ref 135–450)
PMV BLD AUTO: 9.9 FL
POTASSIUM SERPL-SCNC: 3.9 MMOL/L (ref 3.5–5.1)
RBC # BLD AUTO: 3.45 M/UL (ref 4.2–5.9)
SODIUM SERPL-SCNC: 140 MMOL/L (ref 136–145)
WBC OTHER # BLD: 14.3 K/UL (ref 4–11)

## 2024-12-16 PROCEDURE — 2580000003 HC RX 258: Performed by: NURSE PRACTITIONER

## 2024-12-16 PROCEDURE — 6360000004 HC RX CONTRAST MEDICATION: Performed by: NURSE PRACTITIONER

## 2024-12-16 PROCEDURE — 6370000000 HC RX 637 (ALT 250 FOR IP): Performed by: NURSE PRACTITIONER

## 2024-12-16 PROCEDURE — 82962 GLUCOSE BLOOD TEST: CPT

## 2024-12-16 PROCEDURE — 85025 COMPLETE CBC W/AUTO DIFF WBC: CPT

## 2024-12-16 PROCEDURE — 74177 CT ABD & PELVIS W/CONTRAST: CPT

## 2024-12-16 PROCEDURE — 6360000002 HC RX W HCPCS: Performed by: NURSE PRACTITIONER

## 2024-12-16 PROCEDURE — 6360000002 HC RX W HCPCS: Performed by: INTERNAL MEDICINE

## 2024-12-16 PROCEDURE — 36415 COLL VENOUS BLD VENIPUNCTURE: CPT

## 2024-12-16 PROCEDURE — 80048 BASIC METABOLIC PNL TOTAL CA: CPT

## 2024-12-16 PROCEDURE — 2060000000 HC ICU INTERMEDIATE R&B

## 2024-12-16 RX ORDER — POLYETHYLENE GLYCOL 3350 17 G/17G
17 POWDER, FOR SOLUTION ORAL DAILY
Status: DISCONTINUED | OUTPATIENT
Start: 2024-12-16 | End: 2024-12-18 | Stop reason: HOSPADM

## 2024-12-16 RX ORDER — IOPAMIDOL 755 MG/ML
75 INJECTION, SOLUTION INTRAVASCULAR
Status: COMPLETED | OUTPATIENT
Start: 2024-12-16 | End: 2024-12-16

## 2024-12-16 RX ADMIN — ENOXAPARIN SODIUM 40 MG: 100 INJECTION SUBCUTANEOUS at 09:51

## 2024-12-16 RX ADMIN — METOCLOPRAMIDE 5 MG: 5 INJECTION, SOLUTION INTRAMUSCULAR; INTRAVENOUS at 19:45

## 2024-12-16 RX ADMIN — IOPAMIDOL 75 ML: 755 INJECTION, SOLUTION INTRAVENOUS at 14:18

## 2024-12-16 RX ADMIN — METOCLOPRAMIDE 5 MG: 5 INJECTION, SOLUTION INTRAMUSCULAR; INTRAVENOUS at 01:54

## 2024-12-16 RX ADMIN — METOCLOPRAMIDE 5 MG: 5 INJECTION, SOLUTION INTRAMUSCULAR; INTRAVENOUS at 09:51

## 2024-12-16 RX ADMIN — POLYETHYLENE GLYCOL 3350 17 G: 17 POWDER, FOR SOLUTION ORAL at 09:52

## 2024-12-16 RX ADMIN — BISACODYL 10 MG: 10 SUPPOSITORY RECTAL at 09:51

## 2024-12-16 RX ADMIN — PANTOPRAZOLE SODIUM 40 MG: 40 INJECTION, POWDER, FOR SOLUTION INTRAVENOUS at 13:47

## 2024-12-16 RX ADMIN — METOCLOPRAMIDE 5 MG: 5 INJECTION, SOLUTION INTRAMUSCULAR; INTRAVENOUS at 13:47

## 2024-12-16 RX ADMIN — SODIUM CHLORIDE, PRESERVATIVE FREE 10 ML: 5 INJECTION INTRAVENOUS at 09:53

## 2024-12-16 RX ADMIN — HYDROMORPHONE HYDROCHLORIDE 0.5 MG: 1 INJECTION, SOLUTION INTRAMUSCULAR; INTRAVENOUS; SUBCUTANEOUS at 02:05

## 2024-12-16 RX ADMIN — SODIUM CHLORIDE, PRESERVATIVE FREE 10 ML: 5 INJECTION INTRAVENOUS at 19:45

## 2024-12-16 RX ADMIN — PANTOPRAZOLE SODIUM 40 MG: 40 INJECTION, POWDER, FOR SOLUTION INTRAVENOUS at 01:54

## 2024-12-16 ASSESSMENT — PAIN SCALES - GENERAL: PAINLEVEL_OUTOF10: 6

## 2024-12-16 NOTE — CARE COORDINATION
Plan is to  return home w/ spouse, accepted by [employer] (TF), [facility] following.

## 2024-12-17 LAB
ALBUMIN SERPL-MCNC: 2.7 G/DL (ref 3.4–5)
ALBUMIN/GLOB SERPL: 0.8 {RATIO}
ALP SERPL-CCNC: 55 U/L (ref 40–129)
ALT SERPL-CCNC: 51 U/L (ref 10–40)
ANION GAP SERPL CALCULATED.3IONS-SCNC: 10 MMOL/L (ref 3–16)
AST SERPL-CCNC: 50 U/L (ref 15–37)
BILIRUB SERPL-MCNC: 0.4 MG/DL (ref 0–1)
BUN SERPL-MCNC: 13 MG/DL (ref 7–20)
CALCIUM SERPL-MCNC: 8.9 MG/DL (ref 8.3–10.6)
CHLORIDE SERPL-SCNC: 107 MMOL/L (ref 99–110)
CO2 SERPL-SCNC: 21 MMOL/L (ref 21–32)
CREAT SERPL-MCNC: 0.6 MG/DL (ref 0.8–1.3)
GFR, ESTIMATED: >90 ML/MIN/1.73M2
GLUCOSE BLD-MCNC: 116 MG/DL (ref 70–99)
GLUCOSE BLD-MCNC: 126 MG/DL (ref 70–99)
GLUCOSE BLD-MCNC: 129 MG/DL (ref 70–99)
GLUCOSE BLD-MCNC: 136 MG/DL (ref 70–99)
GLUCOSE SERPL-MCNC: 153 MG/DL (ref 70–99)
POTASSIUM SERPL-SCNC: 3.9 MMOL/L (ref 3.5–5.1)
PROT SERPL-MCNC: 6.1 G/DL (ref 6.4–8.2)
SODIUM SERPL-SCNC: 138 MMOL/L (ref 136–145)

## 2024-12-17 PROCEDURE — 6370000000 HC RX 637 (ALT 250 FOR IP): Performed by: NURSE PRACTITIONER

## 2024-12-17 PROCEDURE — 82962 GLUCOSE BLOOD TEST: CPT

## 2024-12-17 PROCEDURE — 2500000003 HC RX 250 WO HCPCS: Performed by: NURSE PRACTITIONER

## 2024-12-17 PROCEDURE — 36415 COLL VENOUS BLD VENIPUNCTURE: CPT

## 2024-12-17 PROCEDURE — 6360000002 HC RX W HCPCS: Performed by: NURSE PRACTITIONER

## 2024-12-17 PROCEDURE — 80053 COMPREHEN METABOLIC PANEL: CPT

## 2024-12-17 PROCEDURE — 2060000000 HC ICU INTERMEDIATE R&B

## 2024-12-17 PROCEDURE — 2580000003 HC RX 258: Performed by: NURSE PRACTITIONER

## 2024-12-17 PROCEDURE — 6360000002 HC RX W HCPCS: Performed by: INTERNAL MEDICINE

## 2024-12-17 RX ORDER — METOCLOPRAMIDE HYDROCHLORIDE 5 MG/5ML
5 SOLUTION ORAL 4 TIMES DAILY
Qty: 750 ML | Refills: 1 | Status: SHIPPED | OUTPATIENT
Start: 2024-12-17

## 2024-12-17 RX ORDER — OXYCODONE HCL 5 MG/5 ML
5 SOLUTION, ORAL ORAL EVERY 8 HOURS PRN
Qty: 45 ML | Refills: 0 | Status: SHIPPED | OUTPATIENT
Start: 2024-12-17 | End: 2024-12-20

## 2024-12-17 RX ORDER — ONDANSETRON 4 MG/1
4 TABLET, ORALLY DISINTEGRATING ORAL EVERY 8 HOURS PRN
Qty: 20 TABLET | Refills: 0 | Status: SHIPPED | OUTPATIENT
Start: 2024-12-17

## 2024-12-17 RX ORDER — POLYETHYLENE GLYCOL 3350 17 G/17G
17 POWDER, FOR SOLUTION ORAL DAILY
Qty: 527 G | Refills: 1 | Status: SHIPPED | OUTPATIENT
Start: 2024-12-17 | End: 2025-02-17

## 2024-12-17 RX ORDER — BISACODYL 10 MG
10 SUPPOSITORY, RECTAL RECTAL DAILY PRN
Qty: 30 SUPPOSITORY | Refills: 0 | Status: SHIPPED | OUTPATIENT
Start: 2024-12-17 | End: 2025-01-16

## 2024-12-17 RX ORDER — ATORVASTATIN CALCIUM 40 MG/1
40 TABLET, FILM COATED ORAL DAILY
Qty: 30 TABLET | Refills: 0 | Status: SHIPPED | OUTPATIENT
Start: 2025-01-15

## 2024-12-17 RX ORDER — LABETALOL 100 MG/1
50 TABLET, FILM COATED ORAL 2 TIMES DAILY
Qty: 60 TABLET | Refills: 0 | Status: SHIPPED
Start: 2025-01-15

## 2024-12-17 RX ORDER — FINASTERIDE 5 MG/1
5 TABLET, FILM COATED ORAL DAILY
Qty: 90 TABLET | Refills: 2 | Status: SHIPPED
Start: 2025-01-15

## 2024-12-17 RX ORDER — BLOOD-GLUCOSE METER
1 KIT MISCELLANEOUS DAILY
Qty: 1 KIT | Refills: 0 | Status: SHIPPED | OUTPATIENT
Start: 2024-12-17

## 2024-12-17 RX ORDER — AMLODIPINE BESYLATE 5 MG/1
5 TABLET ORAL DAILY
Qty: 30 TABLET | Refills: 0 | Status: SHIPPED
Start: 2025-01-15

## 2024-12-17 RX ADMIN — BISACODYL 10 MG: 10 SUPPOSITORY RECTAL at 07:55

## 2024-12-17 RX ADMIN — METOCLOPRAMIDE 5 MG: 5 INJECTION, SOLUTION INTRAMUSCULAR; INTRAVENOUS at 14:15

## 2024-12-17 RX ADMIN — ENOXAPARIN SODIUM 40 MG: 100 INJECTION SUBCUTANEOUS at 08:05

## 2024-12-17 RX ADMIN — SODIUM CHLORIDE, PRESERVATIVE FREE 10 ML: 5 INJECTION INTRAVENOUS at 20:28

## 2024-12-17 RX ADMIN — PANTOPRAZOLE SODIUM 40 MG: 40 INJECTION, POWDER, FOR SOLUTION INTRAVENOUS at 01:18

## 2024-12-17 RX ADMIN — POLYETHYLENE GLYCOL 3350 17 G: 17 POWDER, FOR SOLUTION ORAL at 08:03

## 2024-12-17 RX ADMIN — SODIUM CHLORIDE, PRESERVATIVE FREE 10 ML: 5 INJECTION INTRAVENOUS at 08:06

## 2024-12-17 RX ADMIN — METOCLOPRAMIDE 5 MG: 5 INJECTION, SOLUTION INTRAMUSCULAR; INTRAVENOUS at 20:28

## 2024-12-17 RX ADMIN — PANTOPRAZOLE SODIUM 40 MG: 40 INJECTION, POWDER, FOR SOLUTION INTRAVENOUS at 14:15

## 2024-12-17 RX ADMIN — METOCLOPRAMIDE 5 MG: 5 INJECTION, SOLUTION INTRAMUSCULAR; INTRAVENOUS at 08:05

## 2024-12-17 RX ADMIN — METOCLOPRAMIDE 5 MG: 5 INJECTION, SOLUTION INTRAMUSCULAR; INTRAVENOUS at 01:18

## 2024-12-17 NOTE — DISCHARGE INSTR - COC
ESOPHAGOGASTRODUODENOSCOPY WITH NASAL-JEJUNAL TUBE PLACEMENT performed by Tom Dias MD at [facility] ENDOSCOPY    VASECTOMY         Immunization History:   Immunization History   Administered Date(s) Administered    COVID-19, MODERNA BLUE border, Primary or Immunocompromised, (age 12y+), IM, 100 mcg/0.5mL 03/02/2021, 03/30/2021    COVID-19, PFIZER Bivalent, DO NOT Dilute, (age 12y+), IM, 30 mcg/0.3 mL 10/22/2022    Influenza Virus Vaccine 10/01/2014, 11/05/2015    Influenza, AFLURIA (age 3 y+), FLUZONE, (age 6 mo+), Quadv MDV, 0.5mL 11/03/2016       Active Problems:  Patient Active Problem List   Diagnosis Code    Celiac sprue K90.0    HTN (hypertension) I10    History of CVA (cerebrovascular accident) without residual deficits Z86.73    Benign prostatic hyperplasia N40.0    Bladder cancer (HCC) C67.9    Acute necrotizing pancreatitis K85.91    Pancreatitis, unspecified pancreatitis type K85.90    Severe malnutrition (HCC) E43       Isolation/Infection:   Isolation            No Isolation          Patient Infection Status       None to display            Nurse Assessment:  Last Vital Signs: /83   Pulse 76   Temp 98.4 °F (36.9 °C) (Oral)   Resp 16   Ht 1.778 m (5' 10\")   Wt 72.6 kg (160 lb)   SpO2 96%   BMI 22.96 kg/m²     Last documented pain score (0-10 scale): Pain Level: 6  Last Weight:   Wt Readings from Last 1 Encounters:   12/12/24 72.6 kg (160 lb)     Mental Status:  oriented and alert    IV Access:  - None    Nursing Mobility/ADLs:  Walking   Independent  Transfer  Independent  Bathing  Independent  Dressing  Independent  Toileting  Independent  Feeding  Assisted- TUBE FEEDINGS/ CLEAR LIQUIDS  Med Admin  Assisted  Med Delivery   none    Wound Care Documentation and Therapy:        Elimination:  Continence:   Bowel: Yes  Bladder: Yes  Urinary Catheter: None   Colostomy/Ileostomy/Ileal Conduit: No       Date of Last BM: 12/18    Intake/Output Summary (Last 24 hours) at 12/17/2024 1613  Last data filed

## 2024-12-17 NOTE — CARE COORDINATION
Discharge Planning Note:    Met w/ pt and wife at bedside verified [facility] orders would be submitted for pt,ot rn and hha. Spouse also inquired about blood draws per [facility] and spoke with Lavinia at [facility] and they can do lab draws and states it just needs to be added to order.  Verified TF arranged through [employer] and they would deliver supplies and pump to pt residence. Cm to continue to follow pt d/c needs.

## 2024-12-18 VITALS
HEART RATE: 79 BPM | SYSTOLIC BLOOD PRESSURE: 130 MMHG | WEIGHT: 172 LBS | OXYGEN SATURATION: 96 % | BODY MASS INDEX: 24.62 KG/M2 | RESPIRATION RATE: 18 BRPM | TEMPERATURE: 98.6 F | DIASTOLIC BLOOD PRESSURE: 83 MMHG | HEIGHT: 70 IN

## 2024-12-18 LAB
BASOPHILS # BLD: 0.03 K/UL (ref 0–0.2)
BASOPHILS NFR BLD: 0 %
EOSINOPHIL # BLD: 0.2 K/UL (ref 0–0.6)
EOSINOPHILS RELATIVE PERCENT: 2 %
ERYTHROCYTE [DISTWIDTH] IN BLOOD BY AUTOMATED COUNT: 15 % (ref 12.4–15.4)
GLUCOSE BLD-MCNC: 123 MG/DL (ref 70–99)
GLUCOSE BLD-MCNC: 141 MG/DL (ref 70–99)
HCT VFR BLD AUTO: 30.6 % (ref 40.5–52.5)
HGB BLD-MCNC: 10.1 G/DL (ref 13.5–17.5)
IMM GRANULOCYTES # BLD AUTO: 0.04 K/UL (ref 0–0.5)
IMM GRANULOCYTES NFR BLD: 0 %
LYMPHOCYTES NFR BLD: 3.02 K/UL (ref 1–5.1)
LYMPHOCYTES RELATIVE PERCENT: 22 %
MCH RBC QN AUTO: 29.8 PG (ref 26–34)
MCHC RBC AUTO-ENTMCNC: 33 G/DL (ref 31–36)
MCV RBC AUTO: 90.3 FL (ref 80–100)
MONOCYTES NFR BLD: 0.81 K/UL (ref 0–1.3)
MONOCYTES NFR BLD: 6 %
NEUTROPHILS NFR BLD: 70 %
NEUTS SEG NFR BLD: 9.49 K/UL (ref 1.7–7.7)
PLATELET # BLD AUTO: 283 K/UL (ref 135–450)
PMV BLD AUTO: 10.3 FL
RBC # BLD AUTO: 3.39 M/UL (ref 4.2–5.9)
WBC OTHER # BLD: 13.6 K/UL (ref 4–11)

## 2024-12-18 PROCEDURE — 6360000002 HC RX W HCPCS: Performed by: NURSE PRACTITIONER

## 2024-12-18 PROCEDURE — 85025 COMPLETE CBC W/AUTO DIFF WBC: CPT

## 2024-12-18 PROCEDURE — 2580000003 HC RX 258: Performed by: NURSE PRACTITIONER

## 2024-12-18 PROCEDURE — 2500000003 HC RX 250 WO HCPCS: Performed by: NURSE PRACTITIONER

## 2024-12-18 PROCEDURE — 36415 COLL VENOUS BLD VENIPUNCTURE: CPT

## 2024-12-18 PROCEDURE — 6370000000 HC RX 637 (ALT 250 FOR IP): Performed by: NURSE PRACTITIONER

## 2024-12-18 PROCEDURE — 6360000002 HC RX W HCPCS: Performed by: INTERNAL MEDICINE

## 2024-12-18 PROCEDURE — 82962 GLUCOSE BLOOD TEST: CPT

## 2024-12-18 RX ADMIN — METOCLOPRAMIDE 5 MG: 5 INJECTION, SOLUTION INTRAMUSCULAR; INTRAVENOUS at 14:05

## 2024-12-18 RX ADMIN — SODIUM CHLORIDE, PRESERVATIVE FREE 10 ML: 5 INJECTION INTRAVENOUS at 08:47

## 2024-12-18 RX ADMIN — METOCLOPRAMIDE 5 MG: 5 INJECTION, SOLUTION INTRAMUSCULAR; INTRAVENOUS at 08:45

## 2024-12-18 RX ADMIN — METOCLOPRAMIDE 5 MG: 5 INJECTION, SOLUTION INTRAMUSCULAR; INTRAVENOUS at 03:56

## 2024-12-18 RX ADMIN — POLYETHYLENE GLYCOL 3350 17 G: 17 POWDER, FOR SOLUTION ORAL at 12:23

## 2024-12-18 RX ADMIN — ENOXAPARIN SODIUM 40 MG: 100 INJECTION SUBCUTANEOUS at 08:47

## 2024-12-18 RX ADMIN — PANTOPRAZOLE SODIUM 40 MG: 40 INJECTION, POWDER, FOR SOLUTION INTRAVENOUS at 03:57

## 2024-12-18 RX ADMIN — PANTOPRAZOLE SODIUM 40 MG: 40 INJECTION, POWDER, FOR SOLUTION INTRAVENOUS at 14:08

## 2024-12-18 ASSESSMENT — PAIN SCALES - GENERAL: PAINLEVEL_OUTOF10: 0

## 2024-12-18 NOTE — CARE COORDINATION
Discharge Note     Discharge order received.      Destination: Home    Discharging to Facility/ Agency     Name: [facility]   Address: [address] [facility] [address], [place] [address]   Phone: [phone]     [employer] (Tube Feeds)  [address] [place], [place] [id number] [phone]        Transportation: Family     All case management needs met.        Comment: Pt d/c home in care of family. Family to transport. Spoke with pt and spouse to convey above info

## 2024-12-18 NOTE — DISCHARGE INSTRUCTIONS
Hold miralax for watery diarrhea x 24 hours  Goal of 3 bowel movement a day, soft, pudding consistency    Go very slowly with clear liquid diet, avoid acidic liquids   Stay with sips of liquids x 48 hours before advancing intake     Follow up with GI as directed  Follow up with PCP in 1 week    Home health care to assist with tube feeds     Once resume tube feed at home, start with rate of 30ml/hr x 2 hours, then increase rate to 65ml/hr     Monitor blood pressure at home, call PCP if bp 180/90 or above     Check blood sugar twice a day, morning and evening. If glucose is 200 or above, notify PCP

## 2024-12-18 NOTE — DISCHARGE SUMMARY
09:19 AM       Renal:    Lab Results   Component Value Date/Time     12/17/2024 04:59 AM    K 3.9 12/17/2024 04:59 AM    K 3.9 09/25/2024 09:06 AM     12/17/2024 04:59 AM    CO2 21 12/17/2024 04:59 AM    BUN 13 12/17/2024 04:59 AM    CREATININE 0.6 12/17/2024 04:59 AM    CALCIUM 8.9 12/17/2024 04:59 AM         Significant Diagnostic Studies    Radiology:   CT ABDOMEN PELVIS W IV CONTRAST Additional Contrast? Radiologist Recommendation   Final Result   Sequela of pancreatitis, overall improved.      Electronically signed by [employer]      XR ABDOMEN (2 VIEWS)   Final Result   1.  Findings which can be seen in constipation in the appropriate clinical   setting.   2.  No significant change in support devices.   3.  Right pleural effusion.      Electronically signed by [employer]      XR CHEST PORTABLE   Final Result   1. Mild bibasilar atelectasis.      Electronically signed by Valerio Salamanca      XR ABDOMEN (KUB) (SINGLE AP VIEW)   Final Result      Tip of feeding tube is seen in the proximal jejunum.      Soft biliary stent is noted. Bowel gas pattern is unremarkable.      Electronically signed by Marcello Prasad      CT ABDOMEN PELVIS W IV CONTRAST Additional Contrast? None   Final Result   1.  Sequela of pancreatitis with worsening peripancreatic fluid collections about the pancreatic head, inferiorly along the anterior pararenal fascia, and adjacent to the gastric pylorus/first segment of the duodenum. There are a couple foci of gas    associated with the fluid collections concerning for superimposed infection. These findings are consistent with acute necrotic fluid collections.   2.  Moderate gastric distention secondary to extrinsic compression of the fluid collections on the gastric antrum/pylorus.   3.  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      Electronically signed by Armen Huynh MD             Consults:     IP CONSULT TO

## 2024-12-18 NOTE — PROGRESS NOTES
Hospitalist Progress Note      Name:  Edward Barlow /Age/Sex: 1950  (74 y.o. male)   MRN & CSN:  [id number] & [id number] Encounter Date/Time: 2024 1:40 PM EST    Location:  3222/3222-01 PCP: Ellis Ordonez MD       Hospital Day: 7         Date of Admission: 2024    Chief Complaint: Abdominal pain    Hospital Course: Edward Barlow is a 74 y.o. male with pmh of Bladder cancer (HCC), Celiac disease, Hyperlipidemia, Hypertension, and TIA (transient ischemic attack). Patient presents complaining of upper and lower abdominal pain has been present intermittently over the past several months but got worse over the past 2 days. Patient states that he has a history of pancreatitis which he was admitted for here at [facility] 2024. States he has had nausea but denies any vomiting. Has difficulty eating secondary to his nausea. Pt reports last BM 24. He has tried milk of magnesia, and had little results. Pt denies fever, vomiting, cp, sob, palpitations, headache, lightheadedness, dizziness, dysuria. Patient did report that about a week after he was discharged he woke up one morning and both hands were swollen and his left knee was swollen. It lasted about 24 hours, was mild to moderately painful, and then resolved spontaneously. He has not had any issues with this kind of swelling for the last week.        CT abdomen and pelvis showed:  1. Sequela of pancreatitis with worsening peripancreatic fluid collections about the pancreatic head, inferiorly along the anterior pararenal fascia, and adjacent to the gastric pylorus/first segment of the duodenum. There are a couple foci of gas   associated with the fluid collections concerning for superimposed infection. These findings are consistent with acute necrotic fluid collections.  2.  Moderate gastric distention secondary to extrinsic compression of the fluid collections on the gastric antrum/pylorus.  3.  Compression of the portal vein and 
      Hospitalist Progress Note      Name:  Edward Barlow /Age/Sex: 1950  (74 y.o. male)   MRN & CSN:  [id number] & [id number] Encounter Date/Time: 2024 9:50 AM EST    Location:  3222/3222-01 PCP: Ellis Ordonez MD       Hospital Day: 5         Date of Admission: 2024    Chief Complaint: Abdominal pain    Hospital Course: Edward Barlow is a 74 y.o. male with pmh of Bladder cancer (HCC), Celiac disease, Hyperlipidemia, Hypertension, and TIA (transient ischemic attack). Patient presents complaining of upper and lower abdominal pain has been present intermittently over the past several months but got worse over the past 2 days. Patient states that he has a history of pancreatitis which he was admitted for here at [facility] 2024. States he has had nausea but denies any vomiting. Has difficulty eating secondary to his nausea. Pt reports last BM 24. He has tried milk of magnesia, and had little results. Pt denies fever, vomiting, cp, sob, palpitations, headache, lightheadedness, dizziness, dysuria. Patient did report that about a week after he was discharged he woke up one morning and both hands were swollen and his left knee was swollen. It lasted about 24 hours, was mild to moderately painful, and then resolved spontaneously. He has not had any issues with this kind of swelling for the last week.        CT abdomen and pelvis showed:  1. Sequela of pancreatitis with worsening peripancreatic fluid collections about the pancreatic head, inferiorly along the anterior pararenal fascia, and adjacent to the gastric pylorus/first segment of the duodenum. There are a couple foci of gas   associated with the fluid collections concerning for superimposed infection. These findings are consistent with acute necrotic fluid collections.  2.  Moderate gastric distention secondary to extrinsic compression of the fluid collections on the gastric antrum/pylorus.  3.  Compression of the portal vein and 
      Hospitalist Progress Note      Name:  Edward Barlow /Age/Sex: 1950  (74 y.o. male)   MRN & CSN:  [id number] & [id number] Encounter Date/Time: 12/15/2024 9:47 AM EST    Location:  3222/3222-01 PCP: Ellis Ordonez MD       Hospital Day: 9         Date of Admission: 2024    Chief Complaint: Abdominal pain    Hospital Course: Edward Barlow is a 74 y.o. male with pmh of Bladder cancer (HCC), Celiac disease, Hyperlipidemia, Hypertension, and TIA (transient ischemic attack). Patient presents complaining of upper and lower abdominal pain has been present intermittently over the past several months but got worse over the past 2 days. Patient states that he has a history of pancreatitis which he was admitted for here at [facility] 2024. States he has had nausea but denies any vomiting. Has difficulty eating secondary to his nausea. Pt reports last BM 24. He has tried milk of magnesia, and had little results. Pt denies fever, vomiting, cp, sob, palpitations, headache, lightheadedness, dizziness, dysuria. Patient did report that about a week after he was discharged he woke up one morning and both hands were swollen and his left knee was swollen. It lasted about 24 hours, was mild to moderately painful, and then resolved spontaneously. He has not had any issues with this kind of swelling for the last week.        CT abdomen and pelvis showed:  1. Sequela of pancreatitis with worsening peripancreatic fluid collections about the pancreatic head, inferiorly along the anterior pararenal fascia, and adjacent to the gastric pylorus/first segment of the duodenum. There are a couple foci of gas   associated with the fluid collections concerning for superimposed infection. These findings are consistent with acute necrotic fluid collections.  2.  Moderate gastric distention secondary to extrinsic compression of the fluid collections on the gastric antrum/pylorus.  3.  Compression of the portal vein and 
      Hospitalist Progress Note      PCP: Ellis Ordonez MD    Date of Admission: 12/7/2024    Chief Complaint: abdominal pain     Hospital Course: Edward Barlow is a 74 y.o. male with pmh of Bladder cancer (HCC), Celiac disease, Hyperlipidemia, Hypertension, and TIA (transient ischemic attack). Patient presents complaining of upper and lower abdominal pain has been present intermittently over the past several months but got worse over the past 2 days. Patient states that he has a history of pancreatitis which he was admitted for here at [facility] 11/19/2024.   States he has had nausea but denies any vomiting.  Has difficulty eating secondary to his nausea.  Pt reports last BM 12/1/24. He has tried milk of magnesia, and had little results. Pt denies fever, vomiting, cp, sob, palpitations, headache, lightheadedness, dizziness, dysuria.  Patient did report that about a week after he was discharged he woke up one morning and both hands were swollen and his left knee was swollen.  It lasted about 24 hours, was mild to moderately painful, and then resolved spontaneously.  He has not had any issues with this kind of swelling for the last week.     In ER CT abdomen and pelvis showed:  1. Sequela of pancreatitis with worsening peripancreatic fluid collections about the pancreatic head, inferiorly along the anterior pararenal fascia, and adjacent to the gastric pylorus/first segment of the duodenum. There are a couple foci of gas   associated with the fluid collections concerning for superimposed infection. These findings are consistent with acute necrotic fluid collections.  2.  Moderate gastric distention secondary to extrinsic compression of the fluid collections on the gastric antrum/pylorus.  3.  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     While in ER, pt was treated with morphine IV, zofran IV, zosyn IV. Sepsis protocol ordered     Admit pt to 
      Hospitalist Progress Note      PCP: Ellis Ordonez MD    Date of Admission: 12/7/2024    Chief Complaint: abdominal pain     Hospital Course: Edward Barlow is a 74 y.o. male with pmh of Bladder cancer (HCC), Celiac disease, Hyperlipidemia, Hypertension, and TIA (transient ischemic attack). Patient presents complaining of upper and lower abdominal pain has been present intermittently over the past several months but got worse over the past 2 days. Patient states that he has a history of pancreatitis which he was admitted for here at [facility] 11/19/2024.   States he has had nausea but denies any vomiting.  Has difficulty eating secondary to his nausea.  Pt reports last BM 12/1/24. He has tried milk of magnesia, and had little results. Pt denies fever, vomiting, cp, sob, palpitations, headache, lightheadedness, dizziness, dysuria.  Patient did report that about a week after he was discharged he woke up one morning and both hands were swollen and his left knee was swollen.  It lasted about 24 hours, was mild to moderately painful, and then resolved spontaneously.  He has not had any issues with this kind of swelling for the last week.     In ER CT abdomen and pelvis showed:  1. Sequela of pancreatitis with worsening peripancreatic fluid collections about the pancreatic head, inferiorly along the anterior pararenal fascia, and adjacent to the gastric pylorus/first segment of the duodenum. There are a couple foci of gas   associated with the fluid collections concerning for superimposed infection. These findings are consistent with acute necrotic fluid collections.  2.  Moderate gastric distention secondary to extrinsic compression of the fluid collections on the gastric antrum/pylorus.  3.  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     While in ER, pt was treated with morphine IV, zofran IV, zosyn IV. Sepsis protocol ordered     Admit pt to 
     Nutrition Note    Follow-up. Pt remains NPO with Vital AF TF running @ 65 mL/hr. Currently tolerating with no abodminal pain. Has been having issues with advancement, issues with higher volume? Will trial Two Leodan TF with lower rate of 45 ml/hr. Hopeful to get to goal by end of day. Discussed with pt, anxious to go home.     Start Two Leodan @ 20 mL/hr.  After tolerating TF for 4 hours, advance to goal of 45 mL/hr.    Two Leodan @ 45 mL/hr provides: 900 mL TV, 1800 kcal, 75 gm pro, 630 mL free water.    Electronically signed by Leola Hernandez RD, LD on 12/18/24 at 9:54 AM EST    Contact: Bessy Smart Dietitian  Leola Hernandez RD, LD: [phone]      
     Nutrition Note    Per Lory ADAN and Dr Dias, will proceed with previous TF order, Vital AF @ 65 mL/hr. Goal is 80 mL/hr but unable to reach that. Pt was started on Clear liquids today. Will change TF order back to previous TF regimen.    Electronically signed by Leola Hernandez RD, LD on 12/18/24 at 11:56 AM EST    Contact: Bessy Smart Dietitian  Leola Hernandez RD, LD: [phone]      
     Nutrition Note    Pt tolerating TF , Vital AF @ 35 mL/hr since yesterday. Will advance by 15 mL/hr with a goal rate of 50 ml/hr. Will reassess TF advancement tomorrow if tolerating.     Electronically signed by Leola Hernandez RD, LD on 12/14/24 at 4:08 PM EST    Contact: Bessy Smart Dietitian  Leola Hernandez RD, LD: [phone]      
     Nutrition Note    Pt tolerating TF advancement at 50 mL/hr. Small liquid BM yesterday. Bowel sounds active. Labs WNL. No abdominal pain noted. Will advance to 65 mL/hr and monitor for tolerance.    Electronically signed by Leola Hernandez RD, LD on 12/15/24 at 11:34 AM EST    Contact: Bessy [place] Dietitian  Leola Hernandez RD, LD: [phone]      
    Gastroenterology Progress Note      Edward Barlow is a 74 y.o. male patient.  Principal Problem:    Pancreatitis, unspecified pancreatitis type  Active Problems:    Severe malnutrition (HCC)  Resolved Problems:    * No resolved hospital problems. *      SUBJECTIVE: Patient is doing well today there is a question of whether or not he has some confusion    ROS:  No fever, chills  No chest pain, palpitations  No SOB, cough  Gastrointestinal ROS: no abdominal pain, nausea, vomiting     Physical    VITALS:  /69   Pulse 78   Temp 98.2 °F (36.8 °C) (Oral)   Resp 16   Ht 1.778 m (5' 10\")   Wt 73 kg (161 lb)   SpO2 94%   BMI 23.10 kg/m²   TEMPERATURE:  Current - Temp: 98.2 °F (36.8 °C); Max - Temp  Av.1 °F (36.7 °C)  Min: 97.5 °F (36.4 °C)  Max: 99.3 °F (37.4 °C)    NAD  RRR, Nl s1s2  Lungs CTA Bilaterally, normal effort  Abdomen soft, ND, NT, no HSM, Bowel sounds normal.    Data    Data Review:    Recent Labs     12/09/24  0441 12/10/24  0416 12/11/24  0447   WBC 12.2* 16.0* 14.4*   HGB 10.2* 10.3* 10.3*   HCT 31.0* 30.9* 31.2*   MCV 91.4 90.6 91.0    266 278     Recent Labs     12/09/24  0441 12/10/24  0416 24    140 143   K 3.8 3.7 3.3*    110 113*   CO2 19* 21 22   BUN 13 11 12   CREATININE 0.7* 0.6* 0.6*     Recent Labs     12/09/24  0441 12/10/24  0416 24   AST 36 45* 68*   ALT 27 23 27   BILITOT 0.4 0.4 0.4   ALKPHOS 93 70 108     No results for input(s): \"LIPASE\", \"AMYLASE\" in the last 72 hours.  No results for input(s): \"PROTIME\", \"INR\" in the last 72 hours.  Invalid input(s): \"PTT\"    Radiology Review:        ASSESSMENT severe pancreatitis etiology unknown    -Currently patient has a nasojejunal feeding  -Patient clipped current medical status from his pancreatitis he is in no shape to be discharged home  -I would anticipate this patient will need at least 7 to 12 days here in the hospital  -From my note on Monday this patient does not have any 
    Progress Note    Patient Edward Barlow  MRN: [id number]  YOB: 1950 Age: 74 y.o. Sex: male  Room: [address] [place] [address]       Admitting Physician: George Collado MD   Date of Admission: 12/7/2024  8:25 AM   Primary Care Physician: Ellis Ordonez MD     Subjective:  Edward Barlow was seen and examined. We are following for pancreatitis with peripancreatic fluid collections.  -- Denies abdominal pain currently  -- Small bowel movement yesterday and today    ROS:  Constitutional: Denies fever, no change in appetite  Respiratory: Denies cough or shortness of breath  Cardiovascular: Denies chest pain or edema    Objective:  Vital Signs:   Vitals:    12/14/24 1550   BP: 123/76   Pulse: 78   Resp: 18   Temp: 98.1 °F (36.7 °C)   SpO2: 96%         Physical Exam:  Constitutional: Alert and oriented x 4. No acute distress.   Respiratory: Respirations nonlabored, no crepitus  GI: Abdomen nondistended, soft, and RUQ and epigastric TTP.    Neurological: No focal deficits noted. No asterixis.    Intake/Output:    Intake/Output Summary (Last 24 hours) at 12/14/2024 1604  Last data filed at 12/14/2024 1454  Gross per 24 hour   Intake 1053 ml   Output --   Net 1053 ml        Current Medications:  Current Facility-Administered Medications   Medication Dose Route Frequency Provider Last Rate Last Admin    HYDROmorphone (DILAUDID) injection 0.5 mg  0.5 mg IntraVENous Q3H Ashley Hooker APRN - CNP   0.5 mg at 12/14/24 1514    metoclopramide (REGLAN) injection 5 mg  5 mg IntraVENous Q6H Shekhar Carmen DO   5 mg at 12/14/24 1508    [Held by provider] amLODIPine (NORVASC) tablet 5 mg  5 mg Oral Daily Ashley Hooker APRN - CNP        [Held by provider] aspirin EC tablet 81 mg  81 mg Oral Daily Ashley Hooker APRN - CNP   81 mg at 12/11/24 1610    [Held by provider] atorvastatin (LIPITOR) tablet 40 mg  40 mg Oral Nightly Ashley Hooker APRN - CNP   40 mg at 12/11/24 2103    [Held by provider] finasteride (PROSCAR) tablet 
    Progress Note    Patient Edward Barlow  MRN: [id number]  YOB: 1950 Age: 74 y.o. Sex: male  Room: [address] [place] [address]       Admitting Physician: George Collado MD   Date of Admission: 12/7/2024  8:25 AM   Primary Care Physician: Ellis Ordonez MD     Subjective:  Edward Barlow was seen and examined. We are following for abdominal pain.  -- No significant events overnight  -- Continues with Right side abdominal pain.   -- Denies any Fever/chills but noted low grade temp of 99 this AM w/ WBC up to 16 from 12.2.   -- Tolerating TF's at present.     ROS:  Constitutional: Denies fever, no change in appetite  Respiratory: Denies cough or shortness of breath  Cardiovascular: Denies chest pain or edema    Objective:  Vital Signs:   Vitals:    12/10/24 0808   BP:    Pulse:    Resp: 18   Temp:    SpO2:          Physical Exam:  Constitutional: Alert and oriented x 4. No acute distress.   HEENT: Sclera anicteric, mucosal membranes moist  Cardiovascular: Regular rate and rhythm.  No murmurs.  Respiratory: Respirations nonlabored, no crepitus  GI: Abdomen nondistended, soft, and nontender.  Normal active bowel sounds.  No masses palpable.   Rectal: Deferred  Musculoskeletal:  No pitting edema of the lower legs.  Neurological: Gross memory appears intact.      Intake/Output:    Intake/Output Summary (Last 24 hours) at 12/10/2024 0852  Last data filed at 12/10/2024 0424  Gross per 24 hour   Intake 385 ml   Output --   Net 385 ml        Current Medications:  Current Facility-Administered Medications   Medication Dose Route Frequency Provider Last Rate Last Admin    HYDROmorphone (DILAUDID) injection 0.5 mg  0.5 mg IntraVENous Q3H Lory Lees APRN - CNP   0.5 mg at 12/10/24 0808    0.9 % sodium chloride infusion   IntraVENous Continuous George Collado  mL/hr at 12/10/24 0805 New Bag at 12/10/24 0805    sodium chloride flush 0.9 % injection 5-40 mL  5-40 mL IntraVENous 2 times per day Lory Lees APRN 
  General Surgery Progress Note                 PATIENT NAME: Edward Barlow   Medical Record Number: [id number]  YOB: 1950       TODAY'S DATE: 12/9/2024      Chief Complaint   Patient presents with    Abdominal Pain         SUBJECTIVE:    Pt reports pain slightly improved.  Denies nausea, emesis.       OBJECTIVE:   VITALS:  /73   Pulse 77   Temp 98.7 °F (37.1 °C) (Oral)   Resp 16   Ht 1.778 m (5' 10\")   Wt 70.7 kg (155 lb 13.8 oz)   SpO2 92%   BMI 22.36 kg/m²       INTAKE/OUTPUT:    I/O last 3 completed shifts:  In: 4831.3 [P.O.:720; I.V.:3888.4; IV Piggyback:222.9]  Out: -   No intake/output data recorded.              GENERAL: alert, no distress    LUNGS: Normal rate and effort  ABDOMEN: Soft, moderate distention  EXTREMITY: no cyanosis, clubbing or edema      DATA:  CBC:   Recent Labs     12/07/24  0844 12/08/24  0422 12/09/24  0441   WBC 19.6* 15.5* 12.2*   HGB 13.1* 10.9* 10.2*   HCT 38.6* 33.1* 31.0*    254 256     BMP:    Recent Labs     12/07/24  0844 12/08/24 0422 12/09/24  0441   * 135* 136   K 4.5 4.7 3.8   CL 97* 104 105   CO2 23 23 19*   BUN 14 12 13   CREATININE 0.8 0.7* 0.7*   GLUCOSE 146* 106* 97     Hepatic:   Recent Labs     12/07/24  0844 12/09/24  0441   AST 37 36   ALT 31 27   BILITOT 0.7 0.4   ALKPHOS 93 93     Mag:    No results for input(s): \"MG\" in the last 72 hours.   Phos:   No results for input(s): \"PHOS\" in the last 72 hours.   INR: No results for input(s): \"INR\" in the last 72 hours.            Assessment  Edward Barlow is a 74 y.o. male admitted for acute on chronic pancreatitis with peripancreatic fluid collections      Plan    1.  Acute on chronic pancreatitis  Continue supportive care with IV fluids.  Discussed with Dr. Lybik.  Plans for nasojejunal feeding tube and bowel rest  No evidence of severe necrosis with infection to require surgery  Consider referral to HPB specialist as outpatient.  LFTs unremarkable making GB etiology less 
  Physician Progress Note      PATIENT:               TRINA FRANKLIN  CSN #:                  [id number]  :                       1950  ADMIT DATE:       2024 8:25 AM  DISCH DATE:  RESPONDING  PROVIDER #:        RAFA JACOBS          QUERY TEXT:    Patient admitted with sepsis. Noted to have severe malnutrition per RD note   12/10. If possible, please document in progress notes and discharge summary if   you are evaluating and /or treating any of the following:    The medical record reflects the following:  Risk Factors: acute pancreatitis, gastroparesis with gastric distention,   recent duodenal ulcer    Clinical Indicators: Per RD note 12/10-\" Severe malnutrition (12/10/24 1010)  Context:  Chronic Illness  Findings of the 6 clinical characteristics of malnutrition:  Energy Intake:  75% or less estimated energy requirements for 1 month or   longer  Weight Loss:  7.5% over 3 months\"    Treatment: RD consult, tube feedings started for new [place] tube placement,   weight, I/O    ASPEN Criteria:    https://aspenjournals.onlinelibrary.[place].com/doi/full/10.1177/271057360599036  5  Options provided:  -- Protein calorie malnutrition severe  -- Other - I will add my own diagnosis  -- Disagree - Not applicable / Not valid  -- Disagree - Clinically unable to determine / Unknown  -- Refer to Clinical Documentation Reviewer    PROVIDER RESPONSE TEXT:    This patient has severe protein calorie malnutrition.    Query created by: Samanta Leija on 12/10/2024 11:49 AM      Electronically signed by:  RAFA JACOBS 12/10/2024 2:33 PM          
4 Eyes Skin Assessment     NAME:  Edward Barlow  YOB: 1950  MEDICAL RECORD NUMBER:  [id number]    The patient is being assessed for  Transfer to New Unit    I agree that at least one RN has performed a thorough Head to Toe Skin Assessment on the patient. ALL assessment sites listed below have been assessed.      Areas assessed by both nurses:    Head, Face, Ears, Shoulders, Back, Chest, Arms, Elbows, Hands, Sacrum. Buttock, Coccyx, Ischium, Legs. Feet and Heels, and Under Medical Devices         Does the Patient have a Wound? No noted wound(s)       Robin Prevention initiated by RN: No  Wound Care Orders initiated by RN: No    Pressure Injury (Stage 3,4, Unstageable, DTI, NWPT, and Complex wounds) if present, place Wound referral order by RN under : No    New Ostomies, if present place, Ostomy referral order under : No     Nurse 1 eSignature: Electronically signed by Louann Epps RN on 12/8/24 at 3:29 AM EST    **SHARE this note so that the co-signing nurse can place an eSignature**    Nurse 2 eSignature: Electronically signed by Orly Lindsey RN on 12/8/24 at 3:29 AM EST   
4 Eyes Skin Assessment     NAME:  Edward Barlow  YOB: 1950  MEDICAL RECORD NUMBER:  [id number]    The patient is being assessed for  Admission    I agree that at least one RN has performed a thorough Head to Toe Skin Assessment on the patient. ALL assessment sites listed below have been assessed.      Areas assessed by both nurses:    Head, Face, Ears, Shoulders, Back, Chest, Arms, Elbows, Hands, Sacrum. Buttock, Coccyx, Ischium, Legs. Feet and Heels, and Under Medical Devices         Does the Patient have a Wound? No noted wound(s)       Robin Prevention initiated by RN: No  Wound Care Orders initiated by RN: No    Pressure Injury (Stage 3,4, Unstageable, DTI, NWPT, and Complex wounds) if present, place Wound referral order by RN under : No    New Ostomies, if present place, Ostomy referral order under : No     Nurse 1 eSignature: Electronically signed by Maryjane Miller RN on 12/7/24 at 4:52 PM EST    **SHARE this note so that the co-signing nurse can place an eSignature**    Nurse 2 eSignature: Electronically signed by Genie Ward RN on 12/7/24 at 4:53 PM EST    
AVS reviewed with patient and wife at bedside. All questions answered. Pt and wife packed all belongings. Patient left with [place] tube in place. Paper Script provided to patient. Patient helped into wheelchair and taken down to car by this RN.  Pt's wife took all belongings, wife to transport home.    Electronically signed by Arlen Kent RN on 12/18/2024 at 4:50 PM   
Bedside report and transfer of care given to AMANDA Villalba. Pt currently resting in bed with the call light within reach. Pt denies any other care needs at this time. Pt stable at this time.      Richelle Mcclure RN    
Bedside report and transfer of care given to AMANDA Wang. Pt currently resting in bed with the call light within reach. Pt denies any other care needs at this time. Pt stable at this time.    Richelle Mcclure RN    
Call placed to [facility] at Pt's wife request to inquire about availability of Reglan 5mg/5ml ordered on discharge packet. Spoke with Lela. States they do stock the medication and pt/pt's wife could pick it up with out-of pocket costs.   
[facility] has verified that procedural consent is correctly filled out prior to the start of the procedure.   
Comprehensive Nutrition Assessment    Type and Reason for Visit:  Reassess    Nutrition Recommendations/Plan:   Continue NPO.  Continue Vital AF @ 65 mL/hr until resolution in abdominal pain/distention.  If abdominal pain/distention worsens, lower TF rate back to 50 mL/hr.  When tolerating, advance slowly to goal rate of 80 mL/hr.      Malnutrition Assessment:  Malnutrition Status:  Severe malnutrition (12/10/24 1010)    Context:  Chronic Illness     Findings of the 6 clinical characteristics of malnutrition:  Energy Intake:  75% or less estimated energy requirements for 1 month or longer  Weight Loss:  7.5% over 3 months     Body Fat Loss:  Unable to assess     Muscle Mass Loss:  Unable to assess    Fluid Accumulation:  No fluid accumulation     Strength:  Not Performed    Nutrition Assessment:    Follow-up. Pt remains NPO with Vital AF TF running. Slowly advanced over weekend. TF was advanced to 65 mL/hr yesterday. Pt developed some increased abdominal pain and distention this AM. Will continue current TF rate @ 65 mL/hr and wait to advance TF to goal. If abdominal pain or distention worsens, can decrease TF back to 50 ml/hr. Will add order for daily wts, no recent wts over past few days.    Nutrition Related Findings:    BM 12/16; labs reviewed Wound Type: None       Current Nutrition Intake & Therapies:    Average Meal Intake: NPO  Average Supplements Intake: NPO  Diet NPO Exceptions are: Ice Chips, Sips of Water with Meds  ADULT TUBE FEEDING; Nasojejunal; Peptide Based; Continuous; 65; No; 30; Q 6 hours  Current Tube Feeding (TF) Orders:  Feeding Route: Nasojejunal  Formula: Peptide Based  Schedule: Continuous  Feeding Regimen: @ 50 mL/hr  Additives/Modulars: None  Water Flushes: 30 q 6 hrs  Current TF Provides: Recommend Vital AF with goal rate of 80 mL/hr x 24 hrs (rate adjusted, ~20 hr run time, off for nrsg care). Flush per provider. Reigmen provides: 1600 mL TV, 1920 kcal, 120 gm pro, 1298 mL free 
Comprehensive Nutrition Assessment    Type and Reason for Visit:  Reassess    Nutrition Recommendations/Plan:   Continue NPO.  Resume TF via NJ when okay with MD.  Recommend Vital AF (peptide based) with goal rate 35 mL/hr.   RD to provide new advancement goals daily, monitoring tolerance.  Continue Bowel regimen.     Malnutrition Assessment:  Malnutrition Status:  Severe malnutrition (12/10/24 1010)    Context:  Chronic Illness     Findings of the 6 clinical characteristics of malnutrition:  Energy Intake:  75% or less estimated energy requirements for 1 month or longer  Weight Loss:  7.5% over 3 months     Body Fat Loss:  Unable to assess     Muscle Mass Loss:  Unable to assess    Fluid Accumulation:  No fluid accumulation     Strength:  Not Performed    Nutrition Assessment:    Follow-up. Pt was tolerating Peptide based TF up until yesterday and developed abdominal distention. NJ was clamped. KUB shows air-fluid levels and mild to moderate retained fecal material. Had BM yesterday. Had been tolerating TF at lower rate. Spoke with nursing, possibly restarting TF today. Will restart TF back at 35 mL/hr and will advance per RD recommendations daily. Pt was tolerating slow advancement. Bowel regimen, dulcolax suppositories continues.    Nutrition Related Findings:    BM 12/13; trace BLE edema; Glu 121 Wound Type: None       Current Nutrition Intake & Therapies:    Average Meal Intake: NPO  Average Supplements Intake: NPO  Diet NPO Exceptions are: Ice Chips, Sips of Water with Meds  ADULT TUBE FEEDING; Nasojejunal; Peptide Based; Continuous; 35; No; 30; Q 6 hours  Current Tube Feeding (TF) Orders:  Feeding Route: Nasojejunal  Formula: Peptide Based  Schedule: Continuous  Feeding Regimen: @ 35 mL/hr  Additives/Modulars: None  Water Flushes: 30 q 6 hrs  Current TF Provides: Recommend Vital AF with goal rate of 80 mL/hr x 24 hrs (rate adjusted, ~20 hr run time, off for nrsg care). Flush per provider. Reigmen 
Consult received.  Chart reviewed.  CT scan reviewed.  No need for urgent surgical intervention.  Full consult after morning rounds tomorrow.  
Dr. Tj To notified via perfect serve of General Surgery consult.   
Increased TF rate to 65mL per orders   
Left message with GI answering service to notify RN/NP agreed to hold tube feed d/t abdominal distension and patient discomfort.   
Message left with [facility] answering service for consult for Dr. Moore with GI.   
Patient admitted (PCU status) with diagnosis of Pancreatitis. Patient c/o intermittent cramping to left lower abdomen, medicated with Morphine per MAR. Patient alert and oriented. VSS. Standby assist with ambulation. SR on monitor. NPO with IVF infusing. Patient's wife at bedside upon arrival and was updated on plan of care.   
Patient had large bowel movement in toilet, NP aware  
Patient resting in bed, call light in reach. Denies further needs at this time. Bedside shift report given to Megan PATEL at this time.  
Patient said he was extremely full. Message hospitalist;said to pause feeding for the night.   
Patient sitting in bed. Abdomen a little distended but soft. Patient states pain level of 3. Pain located at R upper quadrant and left lower quadrant.Tube feed infusing at 35ml/hr. Abdomin and patient's comfort will be assessed throughout the night.     2138  Patient is comfortable in bd. Abdomen soft to touch. Schedule pain medication refused at this time.    0030  Patient abdomen is soft to touch. Patient has no pain, says he is comfortable. Vitals stable. Dilaudid not given.    0404  Patient's abdomen soft with little discomfort. Patient ask for pain medication. Dilaudid administered. Patient is resting in bed. Call light within each.  
Patient transferred to Preop 02 in preparation for procedure, VSS.  
Patient's tube feed advanced to 50ml per new orders. Patient updated on plan of care. Patient had 1 small liquid BM this morning. Abdominal pain is controlled at this time. Denies nausea. Abdomen remains soft. Care ongoing.  
Per NP restart TF at 65 and monitor patient  
Pt arrived from endo to PACU bay 8. Reported received from RN/CRNA staff. Pt asleep from anesthesia. Pt on 2L NC, NSR, and VSS. NJ tube placed in procedure tube came out at 127 tube placement. Will continue to monitor.   
Pt refused bed alarm, education provided including but not limited to the risk of injury and death. Patient and spouse express understanding, patient ambulating well at this time.   
Pt resting in bed, vital signs obtained. Mild hypoxia noted with SpO2 of 86 with multiple sites checked. No distress noted. Encouraged to cough and deep breath. Placed on 2L NC. Respiratory notified, IS ordered and at bedside.   
Pt stable and able to be transferred from PACU to room 3222. A&O , VSS, with no complaints at this time. Dahlia called and notified that pt is being transferred out of PACU and to room.   
Report given to AMANDA monique at bedside. Call light in reach.  
Spiritual Health History and Assessment/Progress Note  [facility]    Initial Encounter,  ,  ,      Name: Edward Barlow MRN: [id number]    Age: 74 y.o.     Sex: male   Language: English   Restoration: Unknown   Pancreatitis, unspecified pancreatitis type     Date: 12/11/2024            Total Time Calculated: 1 min              Spiritual Assessment began in [facility] 3 PROGRESSIVE CARE        Referral/Consult From: Rounding   Encounter Overview/Reason: Initial Encounter  Service Provided For: Patient    Marisol, Belief, Meaning:   Patient unable to assess at this time  Family/Friends No family/friends present      Importance and Influence:  Patient unable to assess at this time  Family/Friends No family/friends present    Community:  Patient Other: unable to assess  Family/Friends No family/friends present    Assessment and Plan of Care:     Patient Interventions include: Other: unable to assess  Family/Friends Interventions include: No family/friends present    Patient Plan of Care: Other: unable to assess  Family/Friends Plan of Care: No family/friends present    Electronically signed by CELIA Lazo on 12/11/2024 at 4:43 PM    
Spoke to Dr. Dias and discussed Tube Feed orders. After further discussion, we feel it is best for patient to continue on Vital AF @ 65 mL/hr as he has been tolerating this tube feed well.     Patient may also start on clear liquids slowly per Dr. Dias.   
Tube feed started at 65 ml/hr. 30 ml/hr Q6.     
X ray at bedside to check tube placement per order.  
leukocytosis and evidence of infected necrosis then will need transfer to tertiary center      Dimitry Garcia MD  [phone]  Electronically signed 12/11/2024 at 1:07 PM  
12/12/24 2358    bisacodyl (DULCOLAX) suppository 10 mg  10 mg Rectal Daily Lory Lees, APRN - CNP   10 mg at 12/13/24 0910         Recent Imaging:   XR ABDOMEN (2 VIEWS)  Narrative: EXAM: XR ABDOMEN (2 VIEWS)    INDICATION: abdominal distention    COMPARISON: 12/9/2024 and other prior studies    FINDINGS:    BOWEL GAS PATTERN: Mild stool burden throughout the colon with air-fluid levels.    BONES AND SOFT TISSUES: No significant abnormality. No abnormal calcific  density.    OTHER: Biliary stent unchanged. Enteric tube again terminates over the jejunum.  Blunting of the right costophrenic angle.  Impression: 1.  Findings which can be seen in constipation in the appropriate clinical  setting.  2.  No significant change in support devices.  3.  Right pleural effusion.    Electronically signed by Candelario Yost       Labs:   Recent Labs     12/11/24  0447 12/12/24  0852 12/13/24  0429   WBC 14.4* 16.2*  --    HGB 10.3* 10.5*  --     307  --    ALKPHOS 108  --   --    ALT 27  --   --    AST 68*  --   --    BILITOT 0.4  --   --    BUN 12 16 13   CREATININE 0.6* 0.5* 0.5*    142 141   K 3.3* 3.6 3.7          Assessment:  Hospital Problems             Last Modified POA    * (Principal) Pancreatitis, unspecified pancreatitis type 12/7/2024 Yes    Severe malnutrition (HCC) (Chronic) 12/10/2024 Yes     73 yo M presents with PMHx significant for HTN; HDL, BPH, Celiac disease, Recurrent pancreatitis. Presented to hospital on 12/7/2024 w/ progressive abdominal pain.  We have been consulted for Pancreatitis. F/B Dr. Dias in OP Setting.   Prior to hospital evaluation:   10/23/2024 EUS: Impression: There was a lymph node at the celiac axis A mass was found in the lower third of the main bile duct. Tissue was obtained from this exam, and results are pending. However, the endosonographic appearance. Fine needle biopsy performed. Endosonographic images of the left adrenal gland were unremarkable. There was no 
pantoprazole (PROTONIX) 40 mg in sodium chloride (PF) 0.9 % 10 mL injection  40 mg IntraVENous Q12H Lory Lees, APRN - CNP   40 mg at 12/16/24 0154    bisacodyl (DULCOLAX) suppository 10 mg  10 mg Rectal Daily Lory Lees, APRN - CNP   10 mg at 12/15/24 0941         Recent Imaging:   XR ABDOMEN (2 VIEWS)  Narrative: EXAM: XR ABDOMEN (2 VIEWS)    INDICATION: abdominal distention    COMPARISON: 12/9/2024 and other prior studies    FINDINGS:    BOWEL GAS PATTERN: Mild stool burden throughout the colon with air-fluid levels.    BONES AND SOFT TISSUES: No significant abnormality. No abnormal calcific  density.    OTHER: Biliary stent unchanged. Enteric tube again terminates over the jejunum.  Blunting of the right costophrenic angle.  Impression: 1.  Findings which can be seen in constipation in the appropriate clinical  setting.  2.  No significant change in support devices.  3.  Right pleural effusion.    Electronically signed by Candelario Yost       Labs:   Recent Labs     12/14/24  0453 12/15/24  0434 12/16/24  0549   WBC 11.1*  --  14.3*   HGB 9.9*  --  10.2*     --  277   BUN 13 13 15   CREATININE 0.5* 0.6* 0.6*    140 140   K 3.9 3.7 3.9          Assessment:  Hospital Problems             Last Modified POA    * (Principal) Pancreatitis, unspecified pancreatitis type 12/7/2024 Yes    Severe malnutrition (HCC) (Chronic) 12/10/2024 Yes     73 yo M presents with PMHx significant for HTN; HDL, BPH, Celiac disease, Recurrent pancreatitis. Presented to hospital on 12/7/2024 w/ progressive abdominal pain. We have been consulted for Pancreatitis. F/B Dr. Dias in OP Setting.   Pancreatitis is complicated by peripancreatic fluid collections with possible infection or necrosis.     CT abdomen/pelvis 12/7/2024 1.  Sequela of pancreatitis with worsening peripancreatic fluid collections about the pancreatic head, inferiorly along the anterior pararenal fascia, and adjacent to the gastric 
the pancreatic body.         -  Pancreatic parenchymal abnormalities consisting of cysts were noted in the            pancreatic body.         - This cyst measures 8 mm x 5 mm there was an isoechoic area in the body of            the pancreas it could be a cyst filled with some debris         -  There was no evidence of significant pathology in the visualized portion of            the liver.         -  Hyperechoic material consistent with sludge was visualized            endosonographically in the gallbladder.      Subjective: Patient seen and examined at bedside this morning.  Wife is at bedside.  Patient has NJ tube in place he is tolerating tube feed well at this time.  Patient does report some right-sided abdominal pain.  Did discuss with him elevation and white count and we will obtain a chest x-ray just to ensure there is no aspiration during his procedure patient denies any cough, fever or chills.  Also discussed with the patient and spouse as GI is following and they will give recommendations on how long the NG tube feeding will be last noted by Dr. Dias is up to 2 weeks.  Patient and spouse were aware of this from GI.  Patient follows GI as outpatient Dr. Dias    Assessment and Recommendations:    Acute on chronic pancreatitis with necrosis/superimposed infection  Abdominal pain  -seen on CT abd/pelvis  -GI consulted and recommend \"Continue IV antibiotics. Assess nutritional status and nutrition needs. Repeat imaging, timing to be decided depending on progress\"  -Surgery consulted, discussed with Dr. Duong, plans for NJ feeding tube and bowel rest.  Consider referral to HPB specialist as outpatient  -Continue IV fluids  Per GI recs. Dr. Dias - regarding the crampy abdominal pain, he recommended Dilaudid every 3 hours scheduled.  -Continue Zosyn IV in setting of continued leukocytosis-concern for infected pancreatic pseudocyst.  -Encourage incentive spirometer use  -Diet advancement recommendations per GI 
Perfect Serve    Please note that some or all of this record was generated using voice recognition software. If there are any questions about the content of this document, please contact the author as some errors in translation may have occurred.   
oral intake  -Patient likely home on tube feeds  -Discharge planning to start once patient cleared from GI-planning for repeat CT abdomen pelvis possibly by Monday.  Check with GI if they want imaging completed.  -General Surgery signed off-12/11.  Reconsult if any change in clinical condition, pain, fevers or worsening leukocytosis and evidence of infected necrosis then recommend transfer to tertiary center.     Sepsis POA improved  -Treated with sepsis protocol bolus, and maintenance IV fluids  -Criteria met: Leukocytosis, tachycardia  -Source: Pancreatitis with necrosis  -Obtain blood cultures  - lactic acid, 0.8 12/7  -Leukocytosis downtrending, tachycardia resolved  -Daily CBC, BMP  -Telemonitor  -Chest x-ray-12/10/2024-mild bibasilar atelectasis     Gastric distention  Recent duodenal ulcer  -Seen on CT abdomen pelvis  -Patient recently had to have residual food suctioned out of stomach during EGD  -GI consulted  -Protonix 40 mg IV twice daily      Nausea-resolved  -Continue as needed Zofran     Constipation-improved  -No bowel movement for 6 days; had a BM 12/8  -Trial Dulcolax suppository     Hyperlipidemia  -Triglycerides checked 11/19/2024, resulted at 72  -Pt NPO, hold home PO meds     Hypertension  -BP stable 121/72  -Pt NPO, hold home PO meds     Hx of TIA (transient ischemic attack)  -Pt NPO, hold home PO meds     Hx of Bladder cancer   Celiac disease     Protein calorie malnutrition-severe likely secondary to chronic illness chronic pancreatitis  -Consult to dietitian to monitor tube feed titration  -NJ tube feeds    Chronic comorbidities continue outpatient regimen unless otherwise contraindicated    Active Hospital Problems    Diagnosis     Severe malnutrition (HCC) [E43]     Pancreatitis, unspecified pancreatitis type [K85.90]        Diet Diet NPO Exceptions are: Ice Chips, Sips of Water with Meds  ADULT TUBE FEEDING; Nasojejunal; Peptide Based; Continuous; 35; No; 30; Q 6 hours   DVT Prophylaxis 
splenic vein similar to comparison. The superior mesenteric  vein is improved in caliber with patent distal branches. No portal venous  thrombus.    LYMPH NODES: No pathologically enlarged lymph nodes.    PERITONEUM/RETROPERITONEUM: No free air or ascites.    PELVIC ORGANS AND BLADDER: Left varicocele.    BODY WALL AND SOFT TISSUES: Small fat-containing umbilical hernia.    BONES: No acute or suspicious abnormality.  Impression: Sequela of pancreatitis, overall improved.    Electronically signed by Candelario Yost       Labs:   Recent Labs     12/16/24  0549 12/17/24  0459   WBC 14.3*  --    HGB 10.2*  --      --    ALKPHOS  --  55   ALT  --  51*   AST  --  50*   BILITOT  --  0.4   BUN 15 13   CREATININE 0.6* 0.6*    138   K 3.9 3.9          Assessment:  Hospital Problems             Last Modified POA    * (Principal) Pancreatitis, unspecified pancreatitis type 12/7/2024 Yes    Severe malnutrition (HCC) (Chronic) 12/10/2024 Yes     73 yo M presents with PMHx significant for HTN; HDL, BPH, Celiac disease, Recurrent pancreatitis. Presented to hospital on 12/7/2024 w/ progressive abdominal pain. We have been consulted for Pancreatitis. F/B Dr. Dias in OP Setting.   Pancreatitis is complicated by peripancreatic fluid collections with possible infection or necrosis.   -- 12/16/2024 CT AP w/Contrast: IMPRESSION: Sequela of pancreatitis, overall improved.    Overall patient feeling better.  Tolerating TF's without issues.  Minimal abdominal pain.  I have reached out to Dr. Dias to FU w/ patient/family. Attempted last evening but no answer.      Plan:  Continue supportive care.     Discussed w/ Hospitalist and Dr. Arun CARTWRIGHT, APRN - CNP    [facility]    [phone]. Also available via Perfect Serve    Please note that some or all of this record was generated using voice recognition software. If there are any questions about the content of this document, please contact the author as some errors in 
Perfect Serve    Please note that some or all of this record was generated using voice recognition software. If there are any questions about the content of this document, please contact the author as some errors in translation may have occurred.    
is unremarkable. Electronically signed by Marcello Prasad      CBC:   Recent Labs     12/14/24  0453 12/16/24  0549   WBC 11.1* 14.3*   HGB 9.9* 10.2*    277     BMP:    Recent Labs     12/14/24  0453 12/15/24  0434 12/16/24  0549    140 140   K 3.9 3.7 3.9    109 110   CO2 23 24 23   BUN 13 13 15   CREATININE 0.5* 0.6* 0.6*   GLUCOSE 136* 143* 125*     Hepatic: No results for input(s): \"AST\", \"ALT\", \"BILITOT\", \"ALKPHOS\" in the last 72 hours.    Invalid input(s): \"ALB\"  Lipids:   Lab Results   Component Value Date/Time    CHOL 153 11/05/2015 04:25 PM    HDL 50 11/05/2015 04:25 PM    TRIG 72 11/19/2024 01:55 PM     Hemoglobin A1C: No results found for: \"LABA1C\"  TSH:   Lab Results   Component Value Date/Time    TSH 1.12 11/05/2015 04:25 PM     Troponin: No results found for: \"TROPONINT\"  Lactic Acid: No results for input(s): \"LACTA\" in the last 72 hours.  BNP: No results for input(s): \"PROBNP\" in the last 72 hours.  UA:  Lab Results   Component Value Date/Time    NITRU Negative 09/25/2024 11:16 AM    COLORU Yellow 09/25/2024 11:16 AM    PHUR 7.0 09/25/2024 11:16 AM    CLARITYU Clear 09/25/2024 11:16 AM    LEUKOCYTESUR Negative 09/25/2024 11:16 AM    UROBILINOGEN 0.2 09/25/2024 11:16 AM    BILIRUBINUR Negative 09/25/2024 11:16 AM    BLOODU Negative 09/25/2024 11:16 AM    GLUCOSEU Negative 09/25/2024 11:16 AM    KETUA Negative 09/25/2024 11:16 AM     Urine Cultures: No results found for: \"LABURIN\"  Blood Cultures: No results found for: \"BC\"  No results found for: \"BLOODCULT2\"  Organism: No results found for: \"ORG\"      Electronically signed by HAROON Prakash CNP on 12/16/2024 at 2:55 PM   
CNP on 12/17/2024 at 3:54 PM   
atelectasis.      Electronically signed by Valerio Salamanca      XR ABDOMEN (KUB) (SINGLE AP VIEW)   Final Result      Tip of feeding tube is seen in the proximal jejunum.      Soft biliary stent is noted. Bowel gas pattern is unremarkable.      Electronically signed by Marcello Prasad      CT ABDOMEN PELVIS W IV CONTRAST Additional Contrast? None   Final Result   1.  Sequela of pancreatitis with worsening peripancreatic fluid collections about the pancreatic head, inferiorly along the anterior pararenal fascia, and adjacent to the gastric pylorus/first segment of the duodenum. There are a couple foci of gas    associated with the fluid collections concerning for superimposed infection. These findings are consistent with acute necrotic fluid collections.   2.  Moderate gastric distention secondary to extrinsic compression of the fluid collections on the gastric antrum/pylorus.   3.  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      Electronically signed by MD Ashley Zepeda, APRN - CNP      NOTE:  This report was transcribed using voice recognition software.  Every effort was made to ensure accuracy; however, inadvertent computerized transcription errors may be present.

## 2024-12-18 NOTE — PLAN OF CARE
Problem: Discharge Planning  Goal: Discharge to home or other facility with appropriate resources  12/12/2024 1303 by Tari Hill RN  Outcome: Progressing     Problem: Pain  Goal: Verbalizes/displays adequate comfort level or baseline comfort level  12/12/2024 1303 by Tari Hill RN  Outcome: Progressing     Problem: ABCDS Injury Assessment  Goal: Absence of physical injury  12/12/2024 1303 by Tari Hill RN  Outcome: Progressing     Problem: Skin/Tissue Integrity  Goal: Absence of new skin breakdown  Description: 1.  Monitor for areas of redness and/or skin breakdown  2.  Assess vascular access sites hourly  3.  Every 4-6 hours minimum:  Change oxygen saturation probe site  4.  Every 4-6 hours:  If on nasal continuous positive airway pressure, respiratory therapy assess nares and determine need for appliance change or resting period.  12/12/2024 1303 by Tari Hlil RN  Outcome: Progressing     Problem: Safety - Adult  Goal: Free from fall injury  12/12/2024 1303 by Tari Hill RN  Outcome: Progressing     Problem: Nutrition Deficit:  Goal: Optimize nutritional status  12/12/2024 1303 by Tari Hill RN  Outcome: Progressing     
  Problem: Discharge Planning  Goal: Discharge to home or other facility with appropriate resources  12/13/2024 1153 by Miguelina Andrade RN  Outcome: Progressing  12/13/2024 0336 by Rebecca Cordova RN  Outcome: Progressing     Problem: Pain  Goal: Verbalizes/displays adequate comfort level or baseline comfort level  12/13/2024 1153 by Miguelina Andrade RN  Outcome: Progressing  12/13/2024 0336 by Rebecca Cordova RN  Outcome: Progressing     Problem: ABCDS Injury Assessment  Goal: Absence of physical injury  12/13/2024 1153 by Miguelina Andrade RN  Outcome: Progressing  12/13/2024 0336 by Rebecca Cordova RN  Outcome: Progressing     Problem: Skin/Tissue Integrity  Goal: Absence of new skin breakdown  Description: 1.  Monitor for areas of redness and/or skin breakdown  2.  Assess vascular access sites hourly  3.  Every 4-6 hours minimum:  Change oxygen saturation probe site  4.  Every 4-6 hours:  If on nasal continuous positive airway pressure, respiratory therapy assess nares and determine need for appliance change or resting period.  12/13/2024 1153 by Miguelina Andrade RN  Outcome: Progressing  12/13/2024 0336 by Rebecca Cordova RN  Outcome: Progressing     Problem: Safety - Adult  Goal: Free from fall injury  12/13/2024 1153 by Miguelina Andrade RN  Outcome: Progressing  12/13/2024 0336 by Rebecca Cordova RN  Outcome: Progressing     Problem: Nutrition Deficit:  Goal: Optimize nutritional status  12/13/2024 1153 by Miguelina Andrade RN  Outcome: Progressing  12/13/2024 0336 by Rebecca Cordova RN  Outcome: Progressing     
  Problem: Discharge Planning  Goal: Discharge to home or other facility with appropriate resources  12/14/2024 1300 by Arlen Kent RN  Outcome: Progressing     Problem: Pain  Goal: Verbalizes/displays adequate comfort level or baseline comfort level  12/14/2024 1300 by Arlen Kent RN  Outcome: Progressing     Problem: ABCDS Injury Assessment  Goal: Absence of physical injury  12/14/2024 1300 by Arlen Kent RN  Outcome: Progressing     Problem: Skin/Tissue Integrity  Goal: Absence of new skin breakdown  Description: 1.  Monitor for areas of redness and/or skin breakdown  2.  Assess vascular access sites hourly  3.  Every 4-6 hours minimum:  Change oxygen saturation probe site  4.  Every 4-6 hours:  If on nasal continuous positive airway pressure, respiratory therapy assess nares and determine need for appliance change or resting period.  12/14/2024 1300 by Arlen Kent RN  Outcome: Progressing     Problem: Safety - Adult  Goal: Free from fall injury  12/14/2024 1300 by Arlen Kent RN  Outcome: Progressing    Problem: Nutrition Deficit:  Goal: Optimize nutritional status  12/14/2024 1300 by Arlen Kent RN  Outcome: Progressing     Problem: Gastrointestinal - Adult  Goal: Minimal or absence of nausea and vomiting  Outcome: Progressing     Problem: Gastrointestinal - Adult  Goal: Maintains or returns to baseline bowel function  Outcome: Progressing     Problem: Gastrointestinal - Adult  Goal: Maintains adequate nutritional intake  Outcome: Progressing     
  Problem: Discharge Planning  Goal: Discharge to home or other facility with appropriate resources  12/15/2024 1222 by Arlen Kent RN  Outcome: Progressing     Problem: Pain  Goal: Verbalizes/displays adequate comfort level or baseline comfort level  12/15/2024 1222 by Arlen Kent RN  Outcome: Progressing     Problem: ABCDS Injury Assessment  Goal: Absence of physical injury  12/15/2024 1222 by Arlen Kent RN  Outcome: Progressing  Flowsheets (Taken 12/15/2024 0136 by Megan Kaminski, RN)  Absence of Physical Injury: Implement safety measures based on patient assessment     Problem: Skin/Tissue Integrity  Goal: Absence of new skin breakdown  Description: 1.  Monitor for areas of redness and/or skin breakdown  2.  Assess vascular access sites hourly  3.  Every 4-6 hours minimum:  Change oxygen saturation probe site  4.  Every 4-6 hours:  If on nasal continuous positive airway pressure, respiratory therapy assess nares and determine need for appliance change or resting period.  12/15/2024 1222 by Arlen Kent RN  Outcome: Progressing     Problem: Safety - Adult  Goal: Free from fall injury  12/15/2024 1222 by Arlen Kent RN  Outcome: Progressing  Flowsheets (Taken 12/15/2024 0136 by Megan Kaminski, RN)  Free From Fall Injury: Instruct family/caregiver on patient safety     Problem: Gastrointestinal - Adult  Goal: Maintains or returns to baseline bowel function  12/15/2024 1222 by Arlen Kent RN  Outcome: Progressing     Problem: Gastrointestinal - Adult  Goal: Minimal or absence of nausea and vomiting  12/15/2024 1222 by Alren Kent, RN  Outcome: Progressing     Problem: Gastrointestinal - Adult  Goal: Maintains adequate nutritional intake  12/15/2024 1222 by Arlen Kent RN  Outcome: Progressing     
  Problem: Discharge Planning  Goal: Discharge to home or other facility with appropriate resources  12/18/2024 0028 by Vidya Ramirez RN  Outcome: Progressing  12/17/2024 1612 by Ysabel Bro RN  Outcome: Progressing     Problem: Pain  Goal: Verbalizes/displays adequate comfort level or baseline comfort level  12/18/2024 0028 by Vidya Ramirez RN  Outcome: Progressing  12/17/2024 1612 by Ysabel Bro RN  Outcome: Progressing     Problem: ABCDS Injury Assessment  Goal: Absence of physical injury  12/18/2024 0028 by Vidya Ramirez RN  Outcome: Progressing  12/17/2024 1612 by Ysabel Bro RN  Outcome: Progressing     Problem: Skin/Tissue Integrity  Goal: Absence of new skin breakdown  Description: 1.  Monitor for areas of redness and/or skin breakdown  2.  Assess vascular access sites hourly  3.  Every 4-6 hours minimum:  Change oxygen saturation probe site  4.  Every 4-6 hours:  If on nasal continuous positive airway pressure, respiratory therapy assess nares and determine need for appliance change or resting period.  12/18/2024 0028 by Vidya Ramirez RN  Outcome: Progressing  12/17/2024 1612 by Ysabel Bro RN  Outcome: Progressing     Problem: Safety - Adult  Goal: Free from fall injury  12/18/2024 0028 by Vidya Ramirez RN  Outcome: Progressing  12/17/2024 1612 by Ysabel Bro RN  Outcome: Progressing     Problem: Nutrition Deficit:  Goal: Optimize nutritional status  12/18/2024 0028 by Vidya Ramirez RN  Outcome: Progressing  12/17/2024 1612 by Ysabel Bro RN  Outcome: Progressing     Problem: Gastrointestinal - Adult  Goal: Minimal or absence of nausea and vomiting  12/18/2024 0028 by Vidya Ramirez RN  Outcome: Progressing  12/17/2024 1612 by Ysabel Bro RN  Outcome: Progressing  Goal: Maintains or returns to baseline bowel function  12/18/2024 0028 by Vidya Ramirez RN  Outcome: Progressing  12/17/2024 1612 by Ysabel Bro RN  Outcome: Progressing  Goal: 
  Problem: Discharge Planning  Goal: Discharge to home or other facility with appropriate resources  12/18/2024 1325 by Arlen Kent RN  Outcome: Progressing     Problem: Pain  Goal: Verbalizes/displays adequate comfort level or baseline comfort level  12/18/2024 1325 by Arlen Kent RN  Outcome: Progressing     Problem: ABCDS Injury Assessment  Goal: Absence of physical injury  12/18/2024 1325 by Arlen Kent RN  Outcome: Progressing     Problem: Skin/Tissue Integrity  Goal: Absence of new skin breakdown  Description: 1.  Monitor for areas of redness and/or skin breakdown  2.  Assess vascular access sites hourly  3.  Every 4-6 hours minimum:  Change oxygen saturation probe site  4.  Every 4-6 hours:  If on nasal continuous positive airway pressure, respiratory therapy assess nares and determine need for appliance change or resting period.  12/18/2024 1325 by Arlen Kent RN  Outcome: Progressing     Problem: Safety - Adult  Goal: Free from fall injury  12/18/2024 1325 by Arlen Kent RN  Outcome: Progressing     Problem: Nutrition Deficit:  Goal: Optimize nutritional status  12/18/2024 1325 by Arlen Kent RN  Outcome: Progressing     Problem: Gastrointestinal - Adult  Goal: Minimal or absence of nausea and vomiting  12/18/2024 1325 by Arlen Kent RN  Outcome: Progressing     Problem: Gastrointestinal - Adult  Goal: Maintains or returns to baseline bowel function  12/18/2024 1325 by Arlen Kent RN  Outcome: Progressing     Problem: Gastrointestinal - Adult  Goal: Maintains adequate nutritional intake  12/18/2024 1325 by Arlen Kent, RN  Outcome: Progressing     
  Problem: Discharge Planning  Goal: Discharge to home or other facility with appropriate resources  12/7/2024 2213 by Claudio Short, RN  Outcome: Progressing  Flowsheets (Taken 12/7/2024 2048)  Discharge to home or other facility with appropriate resources:   Identify barriers to discharge with patient and caregiver   Arrange for needed discharge resources and transportation as appropriate  12/7/2024 1644 by Maryjane Miller RN  Outcome: Progressing  Flowsheets  Taken 12/7/2024 1643  Discharge to home or other facility with appropriate resources:   Identify barriers to discharge with patient and caregiver   Identify discharge learning needs (meds, wound care, etc)   Refer to discharge planning if patient needs post-hospital services based on physician order or complex needs related to functional status, cognitive ability or social support system   Arrange for needed discharge resources and transportation as appropriate  Taken 12/7/2024 1534  Discharge to home or other facility with appropriate resources:   Identify barriers to discharge with patient and caregiver   Arrange for needed discharge resources and transportation as appropriate   Identify discharge learning needs (meds, wound care, etc)   Refer to discharge planning if patient needs post-hospital services based on physician order or complex needs related to functional status, cognitive ability or social support system     Problem: Pain  Goal: Verbalizes/displays adequate comfort level or baseline comfort level  12/7/2024 2213 by Claudio Short, RN  Outcome: Progressing  Flowsheets (Taken 12/7/2024 2048)  Verbalizes/displays adequate comfort level or baseline comfort level:   Encourage patient to monitor pain and request assistance   Assess pain using appropriate pain scale   Administer analgesics based on type and severity of pain and evaluate response   Implement non-pharmacological measures as appropriate and evaluate response  12/7/2024 1644 by Angela 
  Problem: Discharge Planning  Goal: Discharge to home or other facility with appropriate resources  12/9/2024 0826 by Corina Young RN  Outcome: Progressing  12/8/2024 2244 by Cecilia Gonzales RN  Outcome: Progressing     Problem: Pain  Goal: Verbalizes/displays adequate comfort level or baseline comfort level  12/9/2024 0826 by Corina Young RN  Outcome: Progressing  12/8/2024 2244 by Cecilia Gonzales RN  Outcome: Progressing     Problem: ABCDS Injury Assessment  Goal: Absence of physical injury  12/9/2024 0826 by Corina Young RN  Outcome: Progressing  12/8/2024 2244 by Cecilia Gonzales RN  Outcome: Progressing     Problem: Skin/Tissue Integrity  Goal: Absence of new skin breakdown  Description: 1.  Monitor for areas of redness and/or skin breakdown  2.  Assess vascular access sites hourly  3.  Every 4-6 hours minimum:  Change oxygen saturation probe site  4.  Every 4-6 hours:  If on nasal continuous positive airway pressure, respiratory therapy assess nares and determine need for appliance change or resting period.  12/9/2024 0826 by Corina Young RN  Outcome: Progressing  12/8/2024 2244 by Cecilia Gonzales RN  Outcome: Progressing     Problem: Safety - Adult  Goal: Free from fall injury  12/9/2024 0826 by Corina Young RN  Outcome: Progressing  12/8/2024 2244 by Cecilia Gonzales RN  Outcome: Progressing     
  Problem: Discharge Planning  Goal: Discharge to home or other facility with appropriate resources  Outcome: Progressing     Problem: Pain  Goal: Verbalizes/displays adequate comfort level or baseline comfort level  Outcome: Progressing     Problem: ABCDS Injury Assessment  Goal: Absence of physical injury  Outcome: Progressing     Problem: Skin/Tissue Integrity  Goal: Absence of new skin breakdown  Description: 1.  Monitor for areas of redness and/or skin breakdown  2.  Assess vascular access sites hourly  3.  Every 4-6 hours minimum:  Change oxygen saturation probe site  4.  Every 4-6 hours:  If on nasal continuous positive airway pressure, respiratory therapy assess nares and determine need for appliance change or resting period.  Outcome: Progressing     Problem: Safety - Adult  Goal: Free from fall injury  Outcome: Progressing     
  Problem: Discharge Planning  Goal: Discharge to home or other facility with appropriate resources  Outcome: Progressing     Problem: Pain  Goal: Verbalizes/displays adequate comfort level or baseline comfort level  Outcome: Progressing     Problem: ABCDS Injury Assessment  Goal: Absence of physical injury  Outcome: Progressing     Problem: Skin/Tissue Integrity  Goal: Absence of new skin breakdown  Description: 1.  Monitor for areas of redness and/or skin breakdown  2.  Assess vascular access sites hourly  3.  Every 4-6 hours minimum:  Change oxygen saturation probe site  4.  Every 4-6 hours:  If on nasal continuous positive airway pressure, respiratory therapy assess nares and determine need for appliance change or resting period.  Outcome: Progressing     Problem: Safety - Adult  Goal: Free from fall injury  Outcome: Progressing     Problem: Nutrition Deficit:  Goal: Optimize nutritional status  Outcome: Progressing     
  Problem: Discharge Planning  Goal: Discharge to home or other facility with appropriate resources  Outcome: Progressing     Problem: Pain  Goal: Verbalizes/displays adequate comfort level or baseline comfort level  Outcome: Progressing     Problem: ABCDS Injury Assessment  Goal: Absence of physical injury  Outcome: Progressing     Problem: Skin/Tissue Integrity  Goal: Absence of new skin breakdown  Description: 1.  Monitor for areas of redness and/or skin breakdown  2.  Assess vascular access sites hourly  3.  Every 4-6 hours minimum:  Change oxygen saturation probe site  4.  Every 4-6 hours:  If on nasal continuous positive airway pressure, respiratory therapy assess nares and determine need for appliance change or resting period.  Outcome: Progressing     Problem: Safety - Adult  Goal: Free from fall injury  Outcome: Progressing     Problem: Nutrition Deficit:  Goal: Optimize nutritional status  Outcome: Progressing     Problem: Gastrointestinal - Adult  Goal: Minimal or absence of nausea and vomiting  Outcome: Progressing  Goal: Maintains or returns to baseline bowel function  Outcome: Progressing  Goal: Maintains adequate nutritional intake  Outcome: Progressing     
  Problem: Discharge Planning  Goal: Discharge to home or other facility with appropriate resources  Outcome: Progressing    Problem: Pain  Goal: Verbalizes/displays adequate comfort level or baseline comfort level  Outcome: Progressing     Problem: ABCDS Injury Assessment  Goal: Absence of physical injury  Outcome: Progressing     Problem: Skin/Tissue Integrity  Goal: Absence of new skin breakdown  Description: 1.  Monitor for areas of redness and/or skin breakdown  2.  Assess vascular access sites hourly  3.  Every 4-6 hours minimum:  Change oxygen saturation probe site  4.  Every 4-6 hours:  If on nasal continuous positive airway pressure, respiratory therapy assess nares and determine need for appliance change or resting period.  Outcome: Progressing     Problem: Safety - Adult  Goal: Free from fall injury  Outcome: Progressing     Problem: Nutrition Deficit:  Goal: Optimize nutritional status  Outcome: Progressing     
  Problem: Discharge Planning  Goal: Discharge to home or other facility with appropriate resources  Outcome: Progressing  Flowsheets  Taken 12/7/2024 1643  Discharge to home or other facility with appropriate resources:   Identify barriers to discharge with patient and caregiver   Identify discharge learning needs (meds, wound care, etc)   Refer to discharge planning if patient needs post-hospital services based on physician order or complex needs related to functional status, cognitive ability or social support system   Arrange for needed discharge resources and transportation as appropriate  Taken 12/7/2024 1534  Discharge to home or other facility with appropriate resources:   Identify barriers to discharge with patient and caregiver   Arrange for needed discharge resources and transportation as appropriate   Identify discharge learning needs (meds, wound care, etc)   Refer to discharge planning if patient needs post-hospital services based on physician order or complex needs related to functional status, cognitive ability or social support system     Problem: Pain  Goal: Verbalizes/displays adequate comfort level or baseline comfort level  Outcome: Progressing     Problem: ABCDS Injury Assessment  Goal: Absence of physical injury  Outcome: Progressing     Problem: Skin/Tissue Integrity  Goal: Absence of new skin breakdown  Description: 1.  Monitor for areas of redness and/or skin breakdown  2.  Assess vascular access sites hourly  3.  Every 4-6 hours minimum:  Change oxygen saturation probe site  4.  Every 4-6 hours:  If on nasal continuous positive airway pressure, respiratory therapy assess nares and determine need for appliance change or resting period.  Outcome: Progressing     
  Problem: Discharge Planning  Goal: Discharge to home or other facility with appropriate resources  Outcome: Progressing  Flowsheets (Taken 12/10/2024 0000 by Orly Lindsey RN)  Discharge to home or other facility with appropriate resources: Identify barriers to discharge with patient and caregiver     Problem: Pain  Goal: Verbalizes/displays adequate comfort level or baseline comfort level  Outcome: Progressing     Problem: ABCDS Injury Assessment  Goal: Absence of physical injury  Outcome: Progressing     Problem: Skin/Tissue Integrity  Goal: Absence of new skin breakdown  Description: 1.  Monitor for areas of redness and/or skin breakdown  2.  Assess vascular access sites hourly  3.  Every 4-6 hours minimum:  Change oxygen saturation probe site  4.  Every 4-6 hours:  If on nasal continuous positive airway pressure, respiratory therapy assess nares and determine need for appliance change or resting period.  Outcome: Progressing     
  Problem: Discharge Planning  Goal: Discharge to home or other facility with appropriate resources  Outcome: Progressing  Flowsheets (Taken 12/16/2024 2000)  Discharge to home or other facility with appropriate resources: Identify barriers to discharge with patient and caregiver     Problem: Pain  Goal: Verbalizes/displays adequate comfort level or baseline comfort level  Outcome: Progressing  Flowsheets (Taken 12/16/2024 1930)  Verbalizes/displays adequate comfort level or baseline comfort level: Encourage patient to monitor pain and request assistance     Problem: ABCDS Injury Assessment  Goal: Absence of physical injury  Outcome: Progressing     Problem: Skin/Tissue Integrity  Goal: Absence of new skin breakdown  Description: 1.  Monitor for areas of redness and/or skin breakdown  2.  Assess vascular access sites hourly  3.  Every 4-6 hours minimum:  Change oxygen saturation probe site  4.  Every 4-6 hours:  If on nasal continuous positive airway pressure, respiratory therapy assess nares and determine need for appliance change or resting period.  Outcome: Progressing     Problem: Safety - Adult  Goal: Free from fall injury  Outcome: Progressing     Problem: Gastrointestinal - Adult  Goal: Minimal or absence of nausea and vomiting  Outcome: Progressing  Flowsheets (Taken 12/16/2024 2000)  Minimal or absence of nausea and vomiting: Maintain NPO status until nausea and vomiting are resolved  Goal: Maintains or returns to baseline bowel function  Outcome: Progressing  Flowsheets (Taken 12/16/2024 2000)  Maintains or returns to baseline bowel function: Encourage mobilization and activity  Goal: Maintains adequate nutritional intake  Outcome: Progressing     
oxygen saturation probe site  4.  Every 4-6 hours:  If on nasal continuous positive airway pressure, respiratory therapy assess nares and determine need for appliance change or resting period.  12/15/2024 0129 by Megan Kaimnski RN  Outcome: Progressing  12/14/2024 1300 by Arlen Kent RN  Outcome: Progressing   Skin assessment performed each shift per protocol.  Patient turned and repositioned every two hours and prn with pillow support. Patient checked for incontence every two hours.     Problem: Safety - Adult  Goal: Free from fall injury  12/15/2024 0129 by Megan Kaminski RN  Outcome: Progressing  12/14/2024 1300 by Arlen Kent RN  Outcome: Progressing   Pt free from falls this shift. Fall precautions in place at all times. Call light always within reach. Pt able and agreeable to contact for safety appropriately.    Problem: Nutrition Deficit:  Goal: Optimize nutritional status  12/15/2024 0129 by Megan Kaminski RN  Outcome: Progressing  12/14/2024 1300 by Arlen Kent RN  Outcome: Progressing     Problem: Gastrointestinal - Adult  Goal: Minimal or absence of nausea and vomiting  12/15/2024 0129 by Megan Kaminski RN  Outcome: Progressing  12/14/2024 1300 by Arlen Kent RN  Outcome: Progressing  Goal: Maintains or returns to baseline bowel function  12/15/2024 0129 by Megan Kaminski RN  Outcome: Progressing  12/14/2024 1300 by Arlen Kent RN  Outcome: Progressing  Goal: Maintains adequate nutritional intake  12/15/2024 0129 by Megan Kaminski RN  Outcome: Progressing  12/14/2024 1300 by Arlen Kent RN  Outcome: Progressing

## 2024-12-18 NOTE — CARE COORDINATION
MARIAJOSE/PRIETO has secured the following home IV infusion services:    MARIAJOSE/PRIETO has called the following  in to [facility]      Enteral Access:Nasojejunal   Formula: Vital AF 1.2 Calorie   Should patient be NPO or Oral Diet: NPO   Delivery MethodContinuous Continuous   Continuous Goal Rate (mL/hr): 45   Water Flush Volume (mL): 30   Water Flush Frequency: Q 6 hours      [facility] [place] Agency: [facility]  [address]   [place] [address]  Phone: [phone]  Fax: [phone]            Any changes made from this point forward will need to be called to the following pharmacy.    [facility]  [address] [place] [address]  Phone: [phone]  Fax: [phone]

## 2024-12-26 ENCOUNTER — APPOINTMENT (OUTPATIENT)
Age: 74
End: 2024-12-26
Payer: MEDICARE

## 2024-12-26 ENCOUNTER — HOSPITAL ENCOUNTER (INPATIENT)
Age: 74
LOS: 2 days | Discharge: HOME HEALTH | End: 2024-12-28
Attending: EMERGENCY MEDICINE | Admitting: STUDENT IN AN ORGANIZED HEALTH CARE EDUCATION/TRAINING PROGRAM
Payer: MEDICARE

## 2024-12-26 DIAGNOSIS — I26.99 PULMONARY EMBOLISM, UNSPECIFIED CHRONICITY, UNSPECIFIED PULMONARY EMBOLISM TYPE, UNSPECIFIED WHETHER ACUTE COR PULMONALE PRESENT (HCC): ICD-10-CM

## 2024-12-26 DIAGNOSIS — I26.99 ACUTE PULMONARY EMBOLISM, UNSPECIFIED PULMONARY EMBOLISM TYPE, UNSPECIFIED WHETHER ACUTE COR PULMONALE PRESENT (HCC): Primary | ICD-10-CM

## 2024-12-26 LAB
ABO + RH BLD: NORMAL
ALBUMIN SERPL-MCNC: 2.7 G/DL (ref 3.4–5)
ALBUMIN/GLOB SERPL: 0.8 {RATIO}
ALP SERPL-CCNC: 66 U/L (ref 40–129)
ALT SERPL-CCNC: 27 U/L (ref 10–40)
ANION GAP SERPL CALCULATED.3IONS-SCNC: 10 MMOL/L (ref 3–16)
ANTI-XA UNFRAC HEPARIN: <0.1 IU/L (ref 0.3–0.7)
ARM BAND NUMBER: NORMAL
AST SERPL-CCNC: 27 U/L (ref 15–37)
BASOPHILS # BLD: 0.02 K/UL (ref 0–0.2)
BASOPHILS NFR BLD: 0 %
BILIRUB SERPL-MCNC: 0.7 MG/DL (ref 0–1)
BILIRUB UR QL STRIP: NEGATIVE
BLOOD BANK SAMPLE EXPIRATION: NORMAL
BLOOD GROUP ANTIBODIES SERPL: NEGATIVE
BNP SERPL-MCNC: 193 PG/ML (ref 0–124)
BUN SERPL-MCNC: 30 MG/DL (ref 7–20)
CALCIUM SERPL-MCNC: 9.6 MG/DL (ref 8.3–10.6)
CHLORIDE SERPL-SCNC: 102 MMOL/L (ref 99–110)
CLARITY UR: CLEAR
CO2 SERPL-SCNC: 27 MMOL/L (ref 21–32)
COLOR UR: YELLOW
COMMENT: ABNORMAL
CREAT SERPL-MCNC: 0.7 MG/DL (ref 0.8–1.3)
EOSINOPHIL # BLD: 0.02 K/UL (ref 0–0.6)
EOSINOPHILS RELATIVE PERCENT: 0 %
ERYTHROCYTE [DISTWIDTH] IN BLOOD BY AUTOMATED COUNT: 15 % (ref 12.4–15.4)
ERYTHROCYTE [DISTWIDTH] IN BLOOD BY AUTOMATED COUNT: 15.1 % (ref 12.4–15.4)
GFR, ESTIMATED: >90 ML/MIN/1.73M2
GLUCOSE SERPL-MCNC: 132 MG/DL (ref 70–99)
GLUCOSE UR STRIP-MCNC: NEGATIVE MG/DL
HCT VFR BLD AUTO: 31.7 % (ref 40.5–52.5)
HCT VFR BLD AUTO: 34.1 % (ref 40.5–52.5)
HGB BLD-MCNC: 10.4 G/DL (ref 13.5–17.5)
HGB BLD-MCNC: 11.2 G/DL (ref 13.5–17.5)
HGB UR QL STRIP.AUTO: NEGATIVE
IMM GRANULOCYTES # BLD AUTO: 0.09 K/UL (ref 0–0.5)
IMM GRANULOCYTES NFR BLD: 0 %
INR PPP: 1.1 (ref 0.9–1.2)
INR PPP: 1.2 (ref 0.9–1.2)
KETONES UR STRIP-MCNC: NEGATIVE MG/DL
LEUKOCYTE ESTERASE UR QL STRIP: NEGATIVE
LIPASE SERPL-CCNC: 25 U/L (ref 13–60)
LYMPHOCYTES NFR BLD: 4.13 K/UL (ref 1–5.1)
LYMPHOCYTES RELATIVE PERCENT: 20 %
MCH RBC QN AUTO: 29.3 PG (ref 26–34)
MCH RBC QN AUTO: 29.5 PG (ref 26–34)
MCHC RBC AUTO-ENTMCNC: 32.8 G/DL (ref 31–36)
MCHC RBC AUTO-ENTMCNC: 32.8 G/DL (ref 31–36)
MCV RBC AUTO: 89.3 FL (ref 80–100)
MCV RBC AUTO: 89.7 FL (ref 80–100)
MONOCYTES NFR BLD: 1.15 K/UL (ref 0–1.3)
MONOCYTES NFR BLD: 6 %
NEUTROPHILS NFR BLD: 74 %
NEUTS SEG NFR BLD: 15.37 K/UL (ref 1.7–7.7)
NITRITE UR QL STRIP: NEGATIVE
PARTIAL THROMBOPLASTIN TIME: 25 SEC (ref 22.1–36.4)
PH UR STRIP: 7.5 [PH] (ref 5–8)
PLATELET # BLD AUTO: 328 K/UL (ref 135–450)
PLATELET # BLD AUTO: 395 K/UL (ref 135–450)
PMV BLD AUTO: 9.5 FL
PMV BLD AUTO: 9.6 FL
POTASSIUM SERPL-SCNC: 4.1 MMOL/L (ref 3.5–5.1)
PROT SERPL-MCNC: 6.3 G/DL (ref 6.4–8.2)
PROT UR STRIP-MCNC: NEGATIVE MG/DL
PROTHROMBIN TIME: 14.8 SEC (ref 11.9–14.9)
PROTHROMBIN TIME: 15 SEC (ref 11.9–14.9)
RBC # BLD AUTO: 3.55 M/UL (ref 4.2–5.9)
RBC # BLD AUTO: 3.8 M/UL (ref 4.2–5.9)
SODIUM SERPL-SCNC: 138 MMOL/L (ref 136–145)
SP GR UR STRIP: <1.005 (ref 1–1.03)
TROPONIN I SERPL HS-MCNC: 16 NG/L (ref 0–22)
UROBILINOGEN UR STRIP-ACNC: 1 EU/DL (ref 0–1)
WBC OTHER # BLD: 16.3 K/UL (ref 4–11)
WBC OTHER # BLD: 20.8 K/UL (ref 4–11)

## 2024-12-26 PROCEDURE — 85025 COMPLETE CBC W/AUTO DIFF WBC: CPT

## 2024-12-26 PROCEDURE — 6360000004 HC RX CONTRAST MEDICATION: Performed by: EMERGENCY MEDICINE

## 2024-12-26 PROCEDURE — 71260 CT THORAX DX C+: CPT

## 2024-12-26 PROCEDURE — 80053 COMPREHEN METABOLIC PANEL: CPT

## 2024-12-26 PROCEDURE — 85610 PROTHROMBIN TIME: CPT

## 2024-12-26 PROCEDURE — 86850 RBC ANTIBODY SCREEN: CPT

## 2024-12-26 PROCEDURE — 84145 PROCALCITONIN (PCT): CPT

## 2024-12-26 PROCEDURE — 96374 THER/PROPH/DIAG INJ IV PUSH: CPT

## 2024-12-26 PROCEDURE — 85027 COMPLETE CBC AUTOMATED: CPT

## 2024-12-26 PROCEDURE — 2060000000 HC ICU INTERMEDIATE R&B

## 2024-12-26 PROCEDURE — 83880 ASSAY OF NATRIURETIC PEPTIDE: CPT

## 2024-12-26 PROCEDURE — 85520 HEPARIN ASSAY: CPT

## 2024-12-26 PROCEDURE — 99285 EMERGENCY DEPT VISIT HI MDM: CPT

## 2024-12-26 PROCEDURE — 86900 BLOOD TYPING SEROLOGIC ABO: CPT

## 2024-12-26 PROCEDURE — 86901 BLOOD TYPING SEROLOGIC RH(D): CPT

## 2024-12-26 PROCEDURE — 83690 ASSAY OF LIPASE: CPT

## 2024-12-26 PROCEDURE — 96365 THER/PROPH/DIAG IV INF INIT: CPT

## 2024-12-26 PROCEDURE — 6360000002 HC RX W HCPCS: Performed by: EMERGENCY MEDICINE

## 2024-12-26 PROCEDURE — 74177 CT ABD & PELVIS W/CONTRAST: CPT

## 2024-12-26 PROCEDURE — 84484 ASSAY OF TROPONIN QUANT: CPT

## 2024-12-26 PROCEDURE — 81003 URINALYSIS AUTO W/O SCOPE: CPT

## 2024-12-26 PROCEDURE — 85730 THROMBOPLASTIN TIME PARTIAL: CPT

## 2024-12-26 RX ORDER — HEPARIN SODIUM 1000 [USP'U]/ML
80 INJECTION, SOLUTION INTRAVENOUS; SUBCUTANEOUS PRN
Status: DISCONTINUED | OUTPATIENT
Start: 2024-12-27 | End: 2024-12-27

## 2024-12-26 RX ORDER — POTASSIUM CHLORIDE 1500 MG/1
40 TABLET, EXTENDED RELEASE ORAL PRN
Status: DISCONTINUED | OUTPATIENT
Start: 2024-12-26 | End: 2024-12-28 | Stop reason: HOSPADM

## 2024-12-26 RX ORDER — SODIUM CHLORIDE 9 MG/ML
INJECTION, SOLUTION INTRAVENOUS PRN
Status: DISCONTINUED | OUTPATIENT
Start: 2024-12-26 | End: 2024-12-28 | Stop reason: HOSPADM

## 2024-12-26 RX ORDER — ATORVASTATIN CALCIUM 40 MG/1
40 TABLET, FILM COATED ORAL DAILY
Status: DISCONTINUED | OUTPATIENT
Start: 2024-12-27 | End: 2024-12-27

## 2024-12-26 RX ORDER — IOPAMIDOL 755 MG/ML
75 INJECTION, SOLUTION INTRAVASCULAR
Status: COMPLETED | OUTPATIENT
Start: 2024-12-26 | End: 2024-12-26

## 2024-12-26 RX ORDER — HEPARIN SODIUM 10000 [USP'U]/100ML
5-30 INJECTION, SOLUTION INTRAVENOUS CONTINUOUS
Status: DISCONTINUED | OUTPATIENT
Start: 2024-12-26 | End: 2024-12-27

## 2024-12-26 RX ORDER — METOCLOPRAMIDE HYDROCHLORIDE 5 MG/5ML
5 SOLUTION ORAL 4 TIMES DAILY
Status: DISCONTINUED | OUTPATIENT
Start: 2024-12-27 | End: 2024-12-28 | Stop reason: HOSPADM

## 2024-12-26 RX ORDER — ONDANSETRON 4 MG/1
4 TABLET, ORALLY DISINTEGRATING ORAL EVERY 8 HOURS PRN
Status: DISCONTINUED | OUTPATIENT
Start: 2024-12-26 | End: 2024-12-28 | Stop reason: HOSPADM

## 2024-12-26 RX ORDER — HEPARIN SODIUM 1000 [USP'U]/ML
80 INJECTION, SOLUTION INTRAVENOUS; SUBCUTANEOUS ONCE
Status: COMPLETED | OUTPATIENT
Start: 2024-12-26 | End: 2024-12-26

## 2024-12-26 RX ORDER — AMLODIPINE BESYLATE 5 MG/1
5 TABLET ORAL DAILY
Status: DISCONTINUED | OUTPATIENT
Start: 2024-12-27 | End: 2024-12-27

## 2024-12-26 RX ORDER — ACETAMINOPHEN 325 MG/1
650 TABLET ORAL EVERY 6 HOURS PRN
Status: DISCONTINUED | OUTPATIENT
Start: 2024-12-26 | End: 2024-12-28 | Stop reason: HOSPADM

## 2024-12-26 RX ORDER — POLYETHYLENE GLYCOL 3350 17 G/17G
17 POWDER, FOR SOLUTION ORAL DAILY PRN
Status: DISCONTINUED | OUTPATIENT
Start: 2024-12-26 | End: 2024-12-28 | Stop reason: HOSPADM

## 2024-12-26 RX ORDER — SODIUM CHLORIDE 0.9 % (FLUSH) 0.9 %
5-40 SYRINGE (ML) INJECTION EVERY 12 HOURS SCHEDULED
Status: DISCONTINUED | OUTPATIENT
Start: 2024-12-26 | End: 2024-12-28 | Stop reason: HOSPADM

## 2024-12-26 RX ORDER — POTASSIUM CHLORIDE 7.45 MG/ML
10 INJECTION INTRAVENOUS PRN
Status: DISCONTINUED | OUTPATIENT
Start: 2024-12-26 | End: 2024-12-28 | Stop reason: HOSPADM

## 2024-12-26 RX ORDER — LABETALOL 100 MG/1
50 TABLET, FILM COATED ORAL 2 TIMES DAILY
Status: DISCONTINUED | OUTPATIENT
Start: 2024-12-26 | End: 2024-12-27

## 2024-12-26 RX ORDER — ACETAMINOPHEN 650 MG/1
650 SUPPOSITORY RECTAL EVERY 6 HOURS PRN
Status: DISCONTINUED | OUTPATIENT
Start: 2024-12-26 | End: 2024-12-28 | Stop reason: HOSPADM

## 2024-12-26 RX ORDER — LEVOFLOXACIN 5 MG/ML
750 INJECTION, SOLUTION INTRAVENOUS ONCE
Status: COMPLETED | OUTPATIENT
Start: 2024-12-26 | End: 2024-12-26

## 2024-12-26 RX ORDER — FINASTERIDE 5 MG/1
5 TABLET, FILM COATED ORAL DAILY
Status: DISCONTINUED | OUTPATIENT
Start: 2024-12-27 | End: 2024-12-27

## 2024-12-26 RX ORDER — SODIUM CHLORIDE 0.9 % (FLUSH) 0.9 %
5-40 SYRINGE (ML) INJECTION PRN
Status: DISCONTINUED | OUTPATIENT
Start: 2024-12-26 | End: 2024-12-28 | Stop reason: HOSPADM

## 2024-12-26 RX ORDER — HEPARIN SODIUM 1000 [USP'U]/ML
40 INJECTION, SOLUTION INTRAVENOUS; SUBCUTANEOUS PRN
Status: DISCONTINUED | OUTPATIENT
Start: 2024-12-27 | End: 2024-12-27

## 2024-12-26 RX ORDER — MAGNESIUM SULFATE IN WATER 40 MG/ML
2000 INJECTION, SOLUTION INTRAVENOUS PRN
Status: DISCONTINUED | OUTPATIENT
Start: 2024-12-26 | End: 2024-12-28 | Stop reason: HOSPADM

## 2024-12-26 RX ADMIN — HEPARIN SODIUM 18 UNITS/KG/HR: 10000 INJECTION, SOLUTION INTRAVENOUS at 21:14

## 2024-12-26 RX ADMIN — HEPARIN SODIUM 5600 UNITS: 1000 INJECTION INTRAVENOUS; SUBCUTANEOUS at 21:10

## 2024-12-26 RX ADMIN — IOPAMIDOL 75 ML: 755 INJECTION, SOLUTION INTRAVENOUS at 20:28

## 2024-12-26 RX ADMIN — IOPAMIDOL 75 ML: 755 INJECTION, SOLUTION INTRAVENOUS at 19:26

## 2024-12-26 RX ADMIN — LEVOFLOXACIN 750 MG: 750 INJECTION, SOLUTION INTRAVENOUS at 21:18

## 2024-12-26 ASSESSMENT — PAIN SCALES - GENERAL: PAINLEVEL_OUTOF10: 0

## 2024-12-26 NOTE — ED TRIAGE NOTES
Pt presents to the ED after being referred by his GI Dr. Dr. Travis referred pt to ED due to hiccups and pale. Pt has a pseudo cyst and GI Dr believes that he is rather bleeding into the cyst or has a bowel obstruction. Pt was recently discharged with a feeding tube from the hospital for pancreatitis.

## 2024-12-27 ENCOUNTER — APPOINTMENT (OUTPATIENT)
Age: 74
End: 2024-12-27
Attending: STUDENT IN AN ORGANIZED HEALTH CARE EDUCATION/TRAINING PROGRAM
Payer: MEDICARE

## 2024-12-27 LAB
ALBUMIN SERPL-MCNC: 2.6 G/DL (ref 3.4–5)
ALBUMIN/GLOB SERPL: 0.8 {RATIO}
ALP SERPL-CCNC: 62 U/L (ref 40–129)
ALT SERPL-CCNC: 22 U/L (ref 10–40)
ANION GAP SERPL CALCULATED.3IONS-SCNC: 8 MMOL/L (ref 3–16)
ANTI-XA UNFRAC HEPARIN: 0.13 IU/L (ref 0.3–0.7)
ANTI-XA UNFRAC HEPARIN: 0.18 IU/L (ref 0.3–0.7)
AST SERPL-CCNC: 23 U/L (ref 15–37)
BASOPHILS # BLD: 0.02 K/UL (ref 0–0.2)
BASOPHILS NFR BLD: 0 %
BILIRUB SERPL-MCNC: 0.6 MG/DL (ref 0–1)
BUN SERPL-MCNC: 27 MG/DL (ref 7–20)
CALCIUM SERPL-MCNC: 9.7 MG/DL (ref 8.3–10.6)
CHLORIDE SERPL-SCNC: 103 MMOL/L (ref 99–110)
CO2 SERPL-SCNC: 28 MMOL/L (ref 21–32)
CREAT SERPL-MCNC: 0.6 MG/DL (ref 0.8–1.3)
ECHO AO ROOT DIAM: 3.8 CM
ECHO AO ROOT INDEX: 2.03 CM/M2
ECHO AV AREA PEAK VELOCITY: 2.4 CM2
ECHO AV AREA VTI: 2.8 CM2
ECHO AV AREA/BSA PEAK VELOCITY: 1.3 CM2/M2
ECHO AV AREA/BSA VTI: 1.5 CM2/M2
ECHO AV CUSP MM: 2.1 CM
ECHO AV MEAN GRADIENT: 4 MMHG
ECHO AV MEAN VELOCITY: 0.9 M/S
ECHO AV PEAK GRADIENT: 7 MMHG
ECHO AV PEAK VELOCITY: 1.3 M/S
ECHO AV VELOCITY RATIO: 0.77
ECHO AV VTI: 22.8 CM
ECHO BSA: 1.86 M2
ECHO EST RA PRESSURE: 15 MMHG
ECHO LV EJECTION FRACTION BIPLANE: 60 % (ref 55–100)
ECHO LV FRACTIONAL SHORTENING: 41 % (ref 28–44)
ECHO LV INTERNAL DIMENSION DIASTOLE INDEX: 2.35 CM/M2
ECHO LV INTERNAL DIMENSION DIASTOLIC: 4.4 CM (ref 4.2–5.9)
ECHO LV INTERNAL DIMENSION SYSTOLIC INDEX: 1.39 CM/M2
ECHO LV INTERNAL DIMENSION SYSTOLIC: 2.6 CM
ECHO LV IVSD: 0.7 CM (ref 0.6–1)
ECHO LV MASS 2D: 118.6 G (ref 88–224)
ECHO LV MASS INDEX 2D: 63.4 G/M2 (ref 49–115)
ECHO LV POSTERIOR WALL DIASTOLIC: 1 CM (ref 0.6–1)
ECHO LV RELATIVE WALL THICKNESS RATIO: 0.45
ECHO LVOT AREA: 3.1 CM2
ECHO LVOT AV VTI INDEX: 0.9
ECHO LVOT DIAM: 2 CM
ECHO LVOT MEAN GRADIENT: 2 MMHG
ECHO LVOT PEAK GRADIENT: 4 MMHG
ECHO LVOT PEAK VELOCITY: 1 M/S
ECHO LVOT STROKE VOLUME INDEX: 34.4 ML/M2
ECHO LVOT SV: 64.4 ML
ECHO LVOT VTI: 20.5 CM
ECHO MV A VELOCITY: 0.59 M/S
ECHO MV AREA VTI: 3.3 CM2
ECHO MV E DECELERATION TIME (DT): 248 MS
ECHO MV E VELOCITY: 0.58 M/S
ECHO MV E/A RATIO: 0.98
ECHO MV LVOT VTI INDEX: 0.97
ECHO MV MAX VELOCITY: 0.7 M/S
ECHO MV MEAN GRADIENT: 1 MMHG
ECHO MV MEAN VELOCITY: 0.5 M/S
ECHO MV PEAK GRADIENT: 2 MMHG
ECHO MV VTI: 19.8 CM
ECHO PV MAX VELOCITY: 1 M/S
ECHO PV PEAK GRADIENT: 4 MMHG
ECHO PVEIN A DURATION: 137 MS
ECHO PVEIN A VELOCITY: 0.2 M/S
ECHO PVEIN PEAK D VELOCITY: 0.2 M/S
ECHO PVEIN PEAK S VELOCITY: 0.3 M/S
ECHO PVEIN S/D RATIO: 1.5 NO UNITS
ECHO RIGHT VENTRICULAR SYSTOLIC PRESSURE (RVSP): 38 MMHG
ECHO RV BASAL DIMENSION: 3.8 CM
ECHO RV FREE WALL PEAK S': 17.2 CM/S
ECHO RV INTERNAL DIMENSION: 4.6 CM
ECHO RV LONGITUDINAL DIMENSION: 7.1 CM
ECHO RV MID DIMENSION: 3.9 CM
ECHO RV TAPSE: 2.5 CM (ref 1.7–?)
ECHO TV REGURGITANT MAX VELOCITY: 2.42 M/S
ECHO TV REGURGITANT PEAK GRADIENT: 23 MMHG
EKG ATRIAL RATE: 74 BPM
EKG DIAGNOSIS: NORMAL
EKG P AXIS: 35 DEGREES
EKG P-R INTERVAL: 180 MS
EKG Q-T INTERVAL: 364 MS
EKG QRS DURATION: 84 MS
EKG QTC CALCULATION (BAZETT): 404 MS
EKG R AXIS: 6 DEGREES
EKG T AXIS: 83 DEGREES
EKG VENTRICULAR RATE: 74 BPM
EOSINOPHIL # BLD: 0.07 K/UL (ref 0–0.6)
EOSINOPHILS RELATIVE PERCENT: 1 %
ERYTHROCYTE [DISTWIDTH] IN BLOOD BY AUTOMATED COUNT: 15.1 % (ref 12.4–15.4)
GFR, ESTIMATED: >90 ML/MIN/1.73M2
GLUCOSE SERPL-MCNC: 99 MG/DL (ref 70–99)
HCT VFR BLD AUTO: 33.1 % (ref 40.5–52.5)
HGB BLD-MCNC: 10.6 G/DL (ref 13.5–17.5)
IMM GRANULOCYTES # BLD AUTO: 0.05 K/UL (ref 0–0.5)
IMM GRANULOCYTES NFR BLD: 0 %
LYMPHOCYTES NFR BLD: 3 K/UL (ref 1–5.1)
LYMPHOCYTES RELATIVE PERCENT: 21 %
MCH RBC QN AUTO: 29 PG (ref 26–34)
MCHC RBC AUTO-ENTMCNC: 32 G/DL (ref 31–36)
MCV RBC AUTO: 90.4 FL (ref 80–100)
MONOCYTES NFR BLD: 0.91 K/UL (ref 0–1.3)
MONOCYTES NFR BLD: 6 %
NEUTROPHILS NFR BLD: 72 %
NEUTS SEG NFR BLD: 10.46 K/UL (ref 1.7–7.7)
PLATELET # BLD AUTO: 314 K/UL (ref 135–450)
PMV BLD AUTO: 9.8 FL
POTASSIUM SERPL-SCNC: 3.7 MMOL/L (ref 3.5–5.1)
PROCALCITONIN SERPL-MCNC: 0.3 NG/ML (ref 0–0.15)
PROT SERPL-MCNC: 6 G/DL (ref 6.4–8.2)
RBC # BLD AUTO: 3.66 M/UL (ref 4.2–5.9)
SODIUM SERPL-SCNC: 139 MMOL/L (ref 136–145)
WBC OTHER # BLD: 14.5 K/UL (ref 4–11)

## 2024-12-27 PROCEDURE — 6370000000 HC RX 637 (ALT 250 FOR IP): Performed by: HOSPITALIST

## 2024-12-27 PROCEDURE — 93970 EXTREMITY STUDY: CPT

## 2024-12-27 PROCEDURE — 36415 COLL VENOUS BLD VENIPUNCTURE: CPT

## 2024-12-27 PROCEDURE — 85520 HEPARIN ASSAY: CPT

## 2024-12-27 PROCEDURE — 85025 COMPLETE CBC W/AUTO DIFF WBC: CPT

## 2024-12-27 PROCEDURE — 6370000000 HC RX 637 (ALT 250 FOR IP): Performed by: STUDENT IN AN ORGANIZED HEALTH CARE EDUCATION/TRAINING PROGRAM

## 2024-12-27 PROCEDURE — 2500000003 HC RX 250 WO HCPCS: Performed by: STUDENT IN AN ORGANIZED HEALTH CARE EDUCATION/TRAINING PROGRAM

## 2024-12-27 PROCEDURE — 93306 TTE W/DOPPLER COMPLETE: CPT

## 2024-12-27 PROCEDURE — 80053 COMPREHEN METABOLIC PANEL: CPT

## 2024-12-27 PROCEDURE — 93970 EXTREMITY STUDY: CPT | Performed by: INTERNAL MEDICINE

## 2024-12-27 PROCEDURE — 6360000002 HC RX W HCPCS: Performed by: STUDENT IN AN ORGANIZED HEALTH CARE EDUCATION/TRAINING PROGRAM

## 2024-12-27 PROCEDURE — 2580000003 HC RX 258: Performed by: STUDENT IN AN ORGANIZED HEALTH CARE EDUCATION/TRAINING PROGRAM

## 2024-12-27 PROCEDURE — 99222 1ST HOSP IP/OBS MODERATE 55: CPT | Performed by: INTERNAL MEDICINE

## 2024-12-27 PROCEDURE — 93010 ELECTROCARDIOGRAM REPORT: CPT | Performed by: INTERNAL MEDICINE

## 2024-12-27 PROCEDURE — 93306 TTE W/DOPPLER COMPLETE: CPT | Performed by: INTERNAL MEDICINE

## 2024-12-27 PROCEDURE — 93005 ELECTROCARDIOGRAM TRACING: CPT | Performed by: INTERNAL MEDICINE

## 2024-12-27 PROCEDURE — 2060000000 HC ICU INTERMEDIATE R&B

## 2024-12-27 RX ORDER — GUAIFENESIN 600 MG/1
600 TABLET, EXTENDED RELEASE ORAL 2 TIMES DAILY PRN
Status: DISCONTINUED | OUTPATIENT
Start: 2024-12-27 | End: 2024-12-28 | Stop reason: HOSPADM

## 2024-12-27 RX ORDER — ALBUTEROL SULFATE 0.83 MG/ML
2.5 SOLUTION RESPIRATORY (INHALATION)
Status: DISCONTINUED | OUTPATIENT
Start: 2024-12-27 | End: 2024-12-28 | Stop reason: HOSPADM

## 2024-12-27 RX ADMIN — Medication 10 ML: at 08:37

## 2024-12-27 RX ADMIN — SODIUM CHLORIDE 40 MG: 9 INJECTION INTRAMUSCULAR; INTRAVENOUS; SUBCUTANEOUS at 06:57

## 2024-12-27 RX ADMIN — HEPARIN SODIUM 2800 UNITS: 1000 INJECTION INTRAVENOUS; SUBCUTANEOUS at 03:37

## 2024-12-27 RX ADMIN — APIXABAN 10 MG: 5 TABLET, FILM COATED ORAL at 17:38

## 2024-12-27 RX ADMIN — METOCLOPRAMIDE HYDROCHLORIDE 5 MG: 5 SOLUTION ORAL at 14:04

## 2024-12-27 RX ADMIN — METOCLOPRAMIDE HYDROCHLORIDE 5 MG: 5 SOLUTION ORAL at 08:34

## 2024-12-27 RX ADMIN — AZITHROMYCIN MONOHYDRATE 500 MG: 500 INJECTION, POWDER, LYOPHILIZED, FOR SOLUTION INTRAVENOUS at 02:13

## 2024-12-27 RX ADMIN — METOCLOPRAMIDE HYDROCHLORIDE 5 MG: 5 SOLUTION ORAL at 17:38

## 2024-12-27 RX ADMIN — WATER 1000 MG: 1 INJECTION INTRAMUSCULAR; INTRAVENOUS; SUBCUTANEOUS at 03:20

## 2024-12-27 RX ADMIN — HEPARIN SODIUM 2800 UNITS: 1000 INJECTION INTRAVENOUS; SUBCUTANEOUS at 10:12

## 2024-12-27 RX ADMIN — METOCLOPRAMIDE HYDROCHLORIDE 5 MG: 5 SOLUTION ORAL at 21:08

## 2024-12-27 RX ADMIN — Medication 10 ML: at 21:08

## 2024-12-27 RX ADMIN — SODIUM CHLORIDE 40 MG: 9 INJECTION INTRAMUSCULAR; INTRAVENOUS; SUBCUTANEOUS at 17:38

## 2024-12-27 ASSESSMENT — PAIN SCALES - GENERAL
PAINLEVEL_OUTOF10: 0

## 2024-12-27 NOTE — PLAN OF CARE
Problem: Discharge Planning  Goal: Discharge to home or other facility with appropriate resources  12/27/2024 1156 by Miguelina Andrade RN  Outcome: Progressing  12/27/2024 0222 by Allie Larson RN  Outcome: Progressing     Problem: Pain  Goal: Verbalizes/displays adequate comfort level or baseline comfort level  12/27/2024 1156 by Miguelina Andrade RN  Outcome: Progressing  12/27/2024 0222 by Allie Larson RN  Outcome: Progressing     Problem: Safety - Adult  Goal: Free from fall injury  12/27/2024 1156 by Miguelina Andrade RN  Outcome: Progressing  12/27/2024 0222 by Allie Larson RN  Outcome: Progressing     Problem: ABCDS Injury Assessment  Goal: Absence of physical injury  12/27/2024 1156 by Miguelina Andrade RN  Outcome: Progressing  12/27/2024 0222 by Allie Larson RN  Outcome: Progressing     Problem: Skin/Tissue Integrity  Goal: Absence of new skin breakdown  Description: 1.  Monitor for areas of redness and/or skin breakdown  2.  Assess vascular access sites hourly  3.  Every 4-6 hours minimum:  Change oxygen saturation probe site  4.  Every 4-6 hours:  If on nasal continuous positive airway pressure, respiratory therapy assess nares and determine need for appliance change or resting period.  12/27/2024 1156 by Miguelina Andrade RN  Outcome: Progressing  12/27/2024 0222 by Allie Larson RN  Outcome: Progressing     Problem: Nutrition Deficit:  Goal: Optimize nutritional status  Outcome: Progressing

## 2024-12-27 NOTE — PROGRESS NOTES
Hospitalist Progress Note  12/27/2024 10:09 AM    PCP: Ellis Ordonez MD    [phone]     Date of Admission: 12/26/2024                                                                                                                     HOSPITAL COURSE    Patient demographics:  The patient  Edward Barlow is a 74 y.o. male     Significant past medical history:   Patient Active Problem List   Diagnosis    Celiac sprue    HTN (hypertension)    History of CVA (cerebrovascular accident) without residual deficits    Benign prostatic hyperplasia    Bladder cancer (HCC)    Acute necrotizing pancreatitis    Pancreatitis, unspecified pancreatitis type    Severe malnutrition (HCC)    Acute pulmonary embolism, unspecified pulmonary embolism type, unspecified whether acute cor pulmonale present (HCC)         Presenting symptoms:  abdominal pain.     Diagnostic workup:      CONSULTS DURING ADMISSION :   IP CONSULT TO CARDIOLOGY  IP CONSULT TO GI  IP CONSULT TO DIETITIAN      Patient was diagnosed with:        Treatment while inpatient:  Edward Barlow is a 74 y.o. male with past medical history of chronic pancreatitis with recent NG tube placement, class III malnutrition, duodenal ulcer, celiac disease hypertension, hyperlipidemia, history of TIA and bladder cancer.      Patient  presented to the ED with a 4-day history of hiccups and worsening abdominal pain.                                                                                        ----------------------------------------------------------      SUBJECTIVE COMPLAINTS- abdominal pain.     Diet: ADULT DIET; Clear Liquid  ADULT TUBE FEEDING; Nasojejunal; Standard with Fiber; Continuous; 30; Yes; 10; Q 6 hours; 65; 30; Q 4 hours      OBJECTIVE:   Patient Active Problem List   Diagnosis    Celiac sprue    HTN (hypertension)    History of CVA (cerebrovascular accident) without residual deficits    Benign prostatic hyperplasia    Bladder cancer (HCC)    Acute necrotizing  rub. No edema.   GI: Abdomen is distended soft and bowel sounds are positive  Lymph: No cervical LAD. No supraclavicular LAD.   M/S: / Ext. No cyanosis. No clubbing. No joint deformity.    Neuro: CN 2-12 are intact,  no neurologic deficits noted.    PT/INR:   Recent Labs     12/26/24  1800 12/26/24  2100   PROTIME 14.8 15.0*   INR 1.1 1.2     APTT:   Recent Labs     12/26/24  2100   APTT 25.0       CBC:   Recent Labs     12/26/24  1800 12/26/24  2100 12/27/24  0311   WBC 20.8* 16.3* 14.5*   HGB 11.2* 10.4* 10.6*   HCT 34.1* 31.7* 33.1*   MCV 89.7 89.3 90.4    328 314       BMP:   Recent Labs     12/26/24  1845 12/27/24  0311    139   K 4.1 3.7    103   CO2 27 28   BUN 30* 27*   CREATININE 0.7* 0.6*       LIVER PROFILE:   Recent Labs     12/26/24  1845 12/27/24  0311   ALKPHOS 66 62   AST 27 23   ALT 27 22   BILITOT 0.7 0.6     No results for input(s): \"AMYLASE\" in the last 72 hours.    Recent Labs     12/26/24  1845   LIPASE 25       UA:No results for input(s): \"NITRITE\", \"LABCAST\", \"WBCUA\", \"RBCUA\", \"MUCUS\" in the last 72 hours.    TROPONIN: No results for input(s): \"CKTOTAL\", \"TROPONINI\" in the last 72 hours.    Lab Results   Component Value Date/Time    URRFLXCULT Not Indicated 09/25/2024 11:16 AM       Invalid input(s): \"TSHREFLEX\"    No components found for: \"LCO2853\"  POC GLUCOSE:  No results for input(s): \"POCGLU\" in the last 72 hours.  No results for input(s): \"LABA1C\" in the last 72 hours. No results found for: \"LABA1C\"    estimated EF of 55 - 60%.        CTA chest with right lower lobe pulmonary embolus with straightening of ventricular septum and mildly elevated RV to LV ratio.     ASSESSMENT AND PLAN    Acute right-sided pulmonary embolism:  Change heparin drip to Eliquis  Cardiology consult is appreciated  No need for pulmonary thrombectomy  Echocardiogram is unremarkable       Bibasilar infiltrates of the lungs:   Likely pneumonia, procalcitonin was 0.3.    Will treat as

## 2024-12-27 NOTE — ED NOTES
ED TO INPATIENT SBAR HANDOFF    Patient Name: Edward Barlow   :  1950  74 y.o.   MRN:  [id number]  Preferred Name    ED Room #:    Family/Caregiver Present yes   Restraints no   Sitter no   Sepsis Risk Score      Situation  Code Status: Prior No additional code details.    Allergies: Gluten meal  Weight: Patient Vitals for the past 96 hrs (Last 3 readings):   Weight   24 1745 70.1 kg (154 lb 9.6 oz)     Arrived from: home  Chief Complaint:   Chief Complaint   Patient presents with    Hiccups     Hospital Problem/Diagnosis:  Principal Problem:    Acute pulmonary embolism, unspecified pulmonary embolism type, unspecified whether acute cor pulmonale present (HCC)  Resolved Problems:    * No resolved hospital problems. *    Imaging:   CT CHEST PULMONARY EMBOLISM W CONTRAST   Final Result         1. Right lower lobe pulmonary artery pulmonary embolism.   2. Straightening of the intraventricular septum mildly elevated RV to LV ratio.   3. Marked fluid distention of the stomach.      Electronically signed by Marcos Reece      CT ABDOMEN PELVIS W IV CONTRAST Additional Contrast? None   Final Result         1. Right lower lobe pulmonary embolus   2. Bibasilar infiltrate pneumonia versus atelectasis.   3. Marked gastric distention. Feeding tube extends into the jejunum.   4. Bilateral nephrolithiasis.         Findings were relayed to the emergency room. 2024 at 7:55 PM      Electronically signed by Marcos Reece        Abnormal labs:   Abnormal Labs Reviewed   CBC WITH AUTO DIFFERENTIAL - Abnormal; Notable for the following components:       Result Value    WBC 20.8 (*)     RBC 3.80 (*)     Hemoglobin 11.2 (*)     Hematocrit 34.1 (*)     Neutrophils Absolute 15.37 (*)     All other components within normal limits   URINALYSIS WITH REFLEX TO CULTURE - Abnormal; Notable for the following components:    Specific Gravity, UA <1.005 (*)     All other components within normal limits   COMPREHENSIVE  Spontaneous  Best Verbal Response: Oriented  Best Motor Response: Obeys commands  [place] Coma Scale Score: 15  Active LDA's:   Peripheral IV 12/26/24 Right Antecubital (Active)   Site Assessment Clean, dry & intact 12/26/24 1809   Line Status Blood return noted;Capped;Flushed 12/26/24 1809   Line Care Cap changed 12/26/24 1809       Peripheral IV 12/26/24 Left Antecubital (Active)                 PO Status: Water / Ice Chips  Pertinent or High Risk Medications/Drips: yes   If Yes, please provide details: Heparin  Pending Blood Product Administration: no       You may also review the ED PT Care Timeline found under the Summary Nursing Index tab.    Recommendation    Pending orders Will need anti-xa unfractionated heparin drawn 0215  Plan for Discharge (if known):   Additional Comments: NG clamped uses for tube feed   If any further questions, please call Sending RN at     Electronically signed by: Electronically signed by Samanta Barlow RN on 12/26/2024 at 10:13 PM

## 2024-12-27 NOTE — CONSULTS
[facility]      CONSULTATION  [phone]      Chief Complaint   Patient presents with    Hiccups      Reason for consult:  PE with RV strain on CT scan    ASSESSMENT / PLAN:     Acute PE (RLL)  HTN  Bladder cancer  History of necrotizing pancreatitis  Celiac disease  Bilateral kidney stones  ? Pancreatic mass  Stomach distention / possible gastric outlet obstruction  Bibasilar infiltrates on CXR - atelectasis versus pneumonia    PLAN  -PE is confined to the RLL.  Patient is hemodynamically stable  -Unlikely that he will need a pulmonary thrombectomy  -Will get echo today to evaluate for echocardiographic evidence of RV strain  -Continue anticoagulation    -Echo done / reviewed - no RV strain    -If no invasive procedures planned per primary team, patient can be switched to a NOAC instead of heparin    -Mild BNP elevation is likely related to PE and requires no further workup or treatment    Cardiology will sign-off at this time. Please call us if there are other issues or questions.       HISTORY OF PRESENT ILLNESS:     Edward Barlow is a 74 y.o. patient who is very medically complex.  He was just discharged from this hospital a little over a week ago after a prolonged admission from 12/7 - 12/18 for necrotizing pancreatitis.  He has a history of bladder cancer, gastric distention, and severe malnutrition.  NJ tube was placed 12/9/2024 for long-term tube feeds.  He was treated with antibiotics with CT scan showing improving pancreatic inflammation.   He has been on oxycodone for pain relief.  He had a recent duodenal ulcer and had to have residual food aspirated from his stomach during an EGD.    Yesterday, patient reported to the ED with a new complaint of severe hiccups.  He had been seen at GI office and was sent to ER by GI due to concerns about a problem with his NJ tube.  No other symptoms.  No chest pain, dyspnea, palpiations, or leg swelling.  He was not hypoxic or tachycardic in the ER.   IntraVENous Continuous Faith Dominguez MD 14 mL/hr at 12/27/24 0746 20 Units/kg/hr at 12/27/24 0746    metoclopramide (REGLAN) 10 MG/10ML solution 5 mg  5 mg Per NG tube 4x daily Faith Dominguez MD        ondansetron (ZOFRAN-ODT) disintegrating tablet 4 mg  4 mg Per NG tube Q8H PRN Faith Dominguez MD        sodium chloride flush 0.9 % injection 5-40 mL  5-40 mL IntraVENous 2 times per day Faith Dominguez MD        sodium chloride flush 0.9 % injection 5-40 mL  5-40 mL IntraVENous PRN Faith Dominguez MD        0.9 % sodium chloride infusion   IntraVENous PRN Faith Dominguez MD        potassium chloride (KLOR-CON M) extended release tablet 40 mEq  40 mEq Oral PRN Faith Dominguez MD        Or    potassium bicarb-citric acid (EFFER-K) effervescent tablet 40 mEq  40 mEq Oral PRN Faith Dominguez MD        Or    potassium chloride 10 mEq/100 mL IVPB (Peripheral Line)  10 mEq IntraVENous PRN Faith Dominguez MD        magnesium sulfate 2000 mg in 50 mL IVPB premix  2,000 mg IntraVENous PRN Faith Dominguez MD        polyethylene glycol (GLYCOLAX) packet 17 g  17 g Oral Daily PRN Faith Dominguez MD        acetaminophen (TYLENOL) tablet 650 mg  650 mg Oral Q6H PRN Faith Dominguez MD        Or    acetaminophen (TYLENOL) suppository 650 mg  650 mg Rectal Q6H PRN Faith Dominguez MD          Allergies:  Gluten meal   Review of Systems:   All systems reviewed and negative except as stated above.    PHYSICAL EXAM:     Vitals:    12/27/24 0400   BP: 111/77   Pulse: 80   Resp: 20   Temp: 97.5 °F (36.4 °C)   SpO2: 98%    Weight - Scale: 70.1 kg (154 lb 9.6 oz)     Body mass index is 22.18 kg/m².    General Appearance:  Alert, cooperative, no distress, appears stated age   Head:  Normocephalic, without obvious abnormality, atraumatic   Eyes:  Conjunctiva/corneas clear   ENT: Moist mucus membranes.  NJ tube in place.   Neck: Supple, trachea midline, no carotid bruit or JVD   Lungs:   Clear to auscultation bilaterally, respirations

## 2024-12-27 NOTE — CARE COORDINATION
Case Management Assessment  Initial Evaluation    Date/Time of Evaluation: 12/27/2024 9:31 AM  Assessment Completed by: WALTER Anderson    If patient is discharged prior to next notation, then this note serves as note for discharge by case management.    Patient Name: Edward Barlow                   YOB: 1950  Diagnosis: Acute pulmonary embolism, unspecified pulmonary embolism type, unspecified whether acute cor pulmonale present (HCC) [I26.99]  Pulmonary embolism, unspecified chronicity, unspecified pulmonary embolism type, unspecified whether acute cor pulmonale present (HCC) [I26.99]                   Date / Time: 12/26/2024  5:37 PM    Patient Admission Status: Inpatient   Readmission Risk (Low < 19, Mod (19-27), High > 27): Readmission Risk Score: 25.8    Current PCP: Ellis Ordonez MD  PCP verified by ? Yes    Chart Reviewed: Yes      History Provided by: Patient, Significant Other  Patient Orientation: Alert and Oriented    Patient Cognition: Alert    Hospitalization in the last 30 days (Readmission):  Yes    If yes, Readmission Assessment in  Navigator will be completed.    Advance Directives:      Code Status: Full Code   Patient's Primary Decision Maker is: Legal Next of Kin      Discharge Planning:    Patient lives with: Spouse/Significant Other Type of Home: House  Primary Care Giver: Spouse  Patient Support Systems include: Spouse/Significant Other   Current Financial resources: Medicare  Current community resources: ECF/Home Care  Current services prior to admission: Home Care            Current DME:              Type of Home Care services:  OT, PT, Nursing Services, Aide Services    ADLS  Prior functional level: Assistance with the following:, Bathing, Dressing, Toileting, Feeding, Cooking, Housework, Shopping, Mobility  Current functional level: Assistance with the following:, Bathing, Dressing, Toileting, Feeding, Cooking, Housework, Shopping, Mobility    PT AM-PAC:   /24  OT

## 2024-12-27 NOTE — H&P
recent NG tube placement, class III malnutrition, duodenal ulcer, celiac disease hypertension, hyperlipidemia, history of TIA and bladder cancer.  He presented to the ED with a 4-day history of hiccups and worsening abdominal pain.  He tested nausea and diarrhea.  No vomiting or chest pain.  Patient saw his gastroenterologist today who advised him to come to the ED for workup.  Patient recent NG tube placement with suspension of outpatient medications, only taking Protonix and Reglan prior to presentation.    Upon arrival at the emergency department, his vital signs were reassuring.  WBC 22.8, rest of CBC + CMP reassuring.  UA not suggestive of UTI.  PT/INR, lipase, troponin all unremarkable.  BNP was 193.  CT of the chest, abdomen + pelvis revealed right lower lobe pulmonary embolus with straightening of ventricular septum and mildly elevated RV to LV ratio, bibasilar pulmonary infiltration.  Marked gastric fluid distention and bilateral nephrolithiasis were also noted.  Patient was started on a heparin bolus + drip, the hospitalist team was asked admit the patient.    Prior medical records in Epic have been thoroughly reviewed and there is no relevant information beyond that already obtained.      Past Medical History:     Past Medical History:   Diagnosis Date    Bladder cancer (HCC) 07/28/2014    Celiac disease     Hyperlipidemia     Hypertension     TIA (transient ischemic attack)         Past Surgical History:     Past Surgical History:   Procedure Laterality Date    ERCP N/A 10/23/2024    ENDOSCOPIC RETROGRADE CHOLANGIOPANCREATOGRAPHY SPHINCTER/PAPILLOTOMY performed by Tom Dias MD at [facility] ENDOSCOPY    ERCP N/A 10/23/2024    ENDOSCOPIC RETROGRADE CHOLANGIOPANCREATOGRAPHY STONE REMOVAL performed by Tom Dias MD at [facility] ENDOSCOPY    ERCP N/A 10/23/2024    ENDOSCOPIC RETROGRADE CHOLANGIOPANCREATOGRAPHY STENT INSERTION performed by Tom Dias MD at [facility] ENDOSCOPY    MASTECTOMY      double    TUMOR REMOVAL   mLs by mouth in the morning, at noon, in the evening, and at bedtime 750 mL 1    ondansetron (ZOFRAN-ODT) 4 MG disintegrating tablet Take 1 tablet by mouth every 8 hours as needed for Nausea or Vomiting 20 tablet 0    triamcinolone (ARISTOCORT) 0.5 % cream Apply topically 3 times daily. 4 Tube 6          Review of Systems:   10 point ROS performed and negative except as in HPI above      Physical Examination:     VITAL SIGNS:  /60   Pulse 86   Temp 97.8 °F (36.6 °C) (Oral)   Resp 29   Ht 1.778 m (5' 10\")   Wt 70.1 kg (154 lb 9.6 oz)   SpO2 92%   BMI 22.18 kg/m²      General: Adult male, nontoxic appearing, NAD  Head: NC, AT  ENT: NG tube in situ, oral mucosa is moist, pharynx not congested, hard of hearing  Eyes: Pink conjunctiva, no scleral icterus, PERRLA   Neck : Supple, no stridor   Lungs : No accessory muscle use, clear to auscultation  Heart : S1S2 regular, no murmur or pedal edema  Abdomen : Soft, non tender, BS+   : Deferred  Musculoskeletal : No limb/joint deformity, moving all 4 limbs spontaneously  Skin: Warm and dry, adequate capillary refill  Neurological : Alert and oriented, following commands, no gross neurological deficits  Psych: Neutral mood, flat affect, not agitated, cooperative      Laboratory Data:     CBC:   Recent Labs     12/26/24  1800 12/26/24  2100   WBC 20.8* 16.3*   HGB 11.2* 10.4*    328     BMP:    Recent Labs     12/26/24  1845      K 4.1      CO2 27   BUN 30*   CREATININE 0.7*   GLUCOSE 132*     Hepatic:   Recent Labs     12/26/24  1845   AST 27   ALT 27   BILITOT 0.7   ALKPHOS 66     Lipids:   Lab Results   Component Value Date/Time    CHOL 153 11/05/2015 04:25 PM    HDL 50 11/05/2015 04:25 PM    TRIG 72 11/19/2024 01:55 PM     Hemoglobin A1C: No results found for: \"LABA1C\"  TSH:   Lab Results   Component Value Date/Time    TSH 1.12 11/05/2015 04:25 PM     Troponin: No results found for: \"TROPONINT\"  Lactic Acid: No results for input(s): \"LACTA\"

## 2024-12-27 NOTE — PROGRESS NOTES
End of Shift Summary:    Significant Events:  - admitted from ED overnight  - plan for echo and US BLE this AM    Neuro: alert and oriented X4, able to make needs known    Cardiac: NSR on telemetry    Respiratory: room air    GI: clear liquid diet, NG bridled @ 127cm. Has orders to start tube feed once verified by nutrition, residual checks unremarkable    : standby to bathroom    IV Access/Drips: right antecubital peripheral, left antecubital peripheral, heparin infusing

## 2024-12-27 NOTE — PROGRESS NOTES
High dose Heparin Infusion protocol:    Once a baseline aPTT has been collected give a 5,600unit IV Bolus dose of Heparin and begin the infusion at 18units/kg/hr OR 1, 260units/hr).  Patient dosing weight is 70.1kg.  Obtain an anti-Xa 6hrs after starting protocol.  Desired range is 0.3-0.7IU/mL.    anti-Xa < 0.1          Heparin 80 units/kg bolus (max 10,000 units)   Increase infusion by 4 units/kg/hr  anti-Xa 0.1-0.29     Heparin 40 units/kg bolus (max 5,000 units)     Increase infusion by 2 units/kg/hr  anti-Xa 0.3-0.7       No bolus                                                            No change   anti-Xa 0.71-0.8     No bolus                                                            Decrease infusion by 1 units/kg/hr  anti-Xa 0.81-0.99   No bolus                                                            Decrease infusion by 2 units/kg/hr  anti-Xa 1 or more   Hold heparin for 1 hour                                     Decrease infusion by 3 units/kg/hr    When Anti-Xa  is within target range for two consecutive times, check Anti-Xa once daily with morning labs.   Valerio Goodman RPH PharmD 12/26/2024 8:15 PM

## 2024-12-27 NOTE — ED PROVIDER NOTES
I PERSONALLY SAW THE PATIENT AND PERFORMED A SUBSTANTIVE PORTION OF THE VISIT INCLUDING ALL ASPECTS OF THE MEDICAL DECISION MAKING PROCESS.    [facility] EMERGENCY DEPT  EMERGENCY DEPARTMENT ENCOUNTER      Pt Name: Edward Barlow  MRN: [id number]  Birthdate 1950  Date of evaluation: 12/26/2024  Provider: Chava Martinez MD    CHIEF COMPLAINT       Chief Complaint   Patient presents with    Hiccups       HISTORY OF PRESENT ILLNESS    Edward Barlow is a 74 y.o. male who presents to the emergency department with GI evaluation.  Patient presents from GI office for evaluation.  GI was concern for issues with patient's NG tube.  Would like CAT scan.  Patient has no complaints at bedside.  Positive for general weakness.  Positive for hiccups.  No other associated symptoms.  No leg swelling.    Nursing Notes were reviewed. Including nursing noted for FM, Surgical History, Past Medical History, Social History, vitals, and allergies; agree with all.     REVIEW OF SYSTEMS       Review of Systems    Except as noted above the remainder of the review of systems was reviewed and negative.     PAST MEDICAL HISTORY     Past Medical History:   Diagnosis Date    Bladder cancer (HCC) 07/28/2014    Celiac disease     Hyperlipidemia     Hypertension     TIA (transient ischemic attack)        SURGICAL HISTORY       Past Surgical History:   Procedure Laterality Date    ERCP N/A 10/23/2024    ENDOSCOPIC RETROGRADE CHOLANGIOPANCREATOGRAPHY SPHINCTER/PAPILLOTOMY performed by Tom Dias MD at [facility] ENDOSCOPY    ERCP N/A 10/23/2024    ENDOSCOPIC RETROGRADE CHOLANGIOPANCREATOGRAPHY STONE REMOVAL performed by Tom Dias MD at [facility] ENDOSCOPY    ERCP N/A 10/23/2024    ENDOSCOPIC RETROGRADE CHOLANGIOPANCREATOGRAPHY STENT INSERTION performed by Tom Dias MD at [facility] ENDOSCOPY    MASTECTOMY      double    TUMOR REMOVAL  06/06/2014    positive bladder tumor    UPPER GASTROINTESTINAL ENDOSCOPY N/A 10/23/2024    ENDOSCOPIC ULTRASOUND with  Sexual Activity    Alcohol use: No    Drug use: Never     Social Determinants of Health     Food Insecurity: No Food Insecurity (12/7/2024)    Hunger Vital Sign     Worried About Running Out of Food in the Last Year: Never true     Ran Out of Food in the Last Year: Never true   Transportation Needs: No Transportation Needs (12/7/2024)    PRAPARE - Transportation     Lack of Transportation (Medical): No     Lack of Transportation (Non-Medical): No   Physical Activity: Insufficiently Active (7/17/2019)    Received from [facility]    Exercise Vital Sign     Days of Exercise per Week: 3 days     Minutes of Exercise per Session: 30 min   Housing Stability: Low Risk  (12/7/2024)    Housing Stability Vital Sign     Unable to Pay for Housing in the Last Year: No     Number of Times Moved in the Last Year: 1     Homeless in the Last Year: No       PHYSICAL EXAM       ED Triage Vitals   BP Systolic BP Percentile Diastolic BP Percentile Temp Temp Source Pulse Respirations SpO2   12/26/24 1745 -- -- 12/26/24 1745 12/26/24 1745 12/26/24 1745 12/26/24 1745 12/26/24 1745   129/78   97.8 °F (36.6 °C) Oral 91 20 94 %      Height Weight - Scale         12/26/24 1750 12/26/24 1745         1.778 m (5' 10\") 70.1 kg (154 lb 9.6 oz)             Physical Exam    DIAGNOSTIC RESULTS     ED BEDSIDE ULTRASOUND:   Performed by ED Physician - none    LABS:  Labs Reviewed   CBC WITH AUTO DIFFERENTIAL - Abnormal; Notable for the following components:       Result Value    WBC 20.8 (*)     RBC 3.80 (*)     Hemoglobin 11.2 (*)     Hematocrit 34.1 (*)     Neutrophils Absolute 15.37 (*)     All other components within normal limits   URINALYSIS WITH REFLEX TO CULTURE - Abnormal; Notable for the following components:    Specific Gravity, UA <1.005 (*)     All other components within normal limits   COMPREHENSIVE METABOLIC PANEL W/ REFLEX TO MG FOR LOW K - Abnormal; Notable for the following components:    Glucose 132 (*)     BUN 30 (*)

## 2024-12-27 NOTE — PROGRESS NOTES
Spiritual Health History and Assessment/Progress Note  [facility]    Attempted Encounter,  ,  ,      Name: Edward Barlow MRN: [id number]    Age: 74 y.o.     Sex: male   Language: English   Sabianism: Unknown   Acute pulmonary embolism, unspecified pulmonary embolism type, unspecified whether acute cor pulmonale present (Formerly [facility])     Date: 12/27/2024                           Spiritual Assessment began in [facility] 3 PROGRESSIVE CARE        Referral/Consult From: Rounding   Encounter Overview/Reason: Attempted Encounter  Service Provided For: Patient not available    Marisol, Belief, Meaning:   Patient Other: unable to assess at this time /patient was with staff  Family/Friends No family/friends present      Importance and Influence:  Patient unable to assess at this time/patient was with staff  Family/Friends No family/friends present    Community:  Patient Other: unable to assess  Family/Friends No family/friends present    Assessment and Plan of Care:     Patient Interventions include: Other: unable to assess  Family/Friends Interventions include: No family/friends present    Patient Plan of Care: Other: unable to assess  Family/Friends Plan of Care: No family/friends present    Electronically signed by CELIA Lazo on 12/27/2024 at 4:10 PM

## 2024-12-27 NOTE — CONSULTS
Comprehensive Nutrition Assessment    Type and Reason for Visit:  Positive nutrition screen, Nutrition support, Consult (TF ordering and management; malnutrition)    Nutrition Recommendations/Plan:   Continue Clear liquid diet.  Change Vital AF to Jevity 1.5 and monitor tolerance.  Recommend Jevity 1.5 with goal rate of 65 mL/hr.     Malnutrition Assessment:  Malnutrition Status:  Severe malnutrition (12/27/24 0941)    Context:  Chronic Illness     Findings of the 6 clinical characteristics of malnutrition:  Energy Intake:  Mild decrease in energy intake  Weight Loss:  7.5% over 3 months     Body Fat Loss:  No body fat loss     Muscle Mass Loss:  Mild muscle mass loss Temples (temporalis)    Nutrition Assessment:    + nutrition screen/ Consult \"TF ordering and management; malnutrition\". Pt with PMH of HLD, HTN, and celiac disease, who presented with hiccups. Pt with recent extended admission r/t pancreatitis. NJ was placed on 12/9 for pancreas rest, Vital 1.2 @ 65 mL/hr. TF currently running Vital AF @ 30 ml/hr. Reglan on board. Clear liquids ordered. Recommend trialing Jevity 1.5 with goal rate of 65 mL/hr. Will meet nutrition needs better.    Nutrition Related Findings:    BM 12/27; no edema noted; labs reviewed; Wound Type: None       Current Nutrition Intake & Therapies:    Average Meal Intake:  (on clear liquid diet)  Average Supplements Intake: None Ordered  ADULT DIET; Clear Liquid  ADULT TUBE FEEDING; Nasogastric; Other Tube Feeding (specify); Vital AF; Continuous; 30; Yes; 5; Q 4 hours; 65; 30; Q 4 hours  Current Tube Feeding (TF) Orders:  Feeding Route: Nasojejunal  Formula: Peptide Based  Schedule: Continuous  Feeding Regimen: @ 30 ml/hr  Additives/Modulars: None  Water Flushes: 30 q 4 hrs  Current TF Provides: Vital AF @ 30 mL/hr. Regimen: 600 mL TV, 720 kcal, 45 gm pro, 487 mL free water.    Anthropometric Measures:  Height: 177.8 cm (5' 10\")  Ideal Body Weight (IBW): 166 lbs (75 kg)       Current Body

## 2024-12-27 NOTE — CONSULTS
Consultation Note    Patient Name: Edward Barlow  : 1950  Age: 74 y.o.     Admitting Physician: Nicholas Griggs MD   Date of Admission: 2024  5:37 PM   Primary Care Physician: Ellis Ordonez MD        Edward Barlow is being seen at the request of Nicholas Griggs MD for abnormal CT scan.    History of Present Illness:  74-year-old male with a past medical history of celiac disease and a history of complicated pancreatitis with fluid collections requiring NJ tube feedings with previous EUS and ERCP who is known to our service and was admitted with a new diagnosis of a pulmonary embolism.  The patient has been receiving NJ tube feedings and clear liquids only at home.  The CT did notice some gastric distention.  Patient denies any nausea or vomiting.    GI History:  Upper EUS 10/23/2024  Significant for an irregular heterogeneous mass was identified endosonographically in the lower third of the main bile duct.  The mass measures 7.4 mm x 7.2mm in maximal cross-sectional diameter.  Fine-needle biopsy was performed.  There was a lymph node at the celiac axis.  Fine-needle biopsy was performed.  There was no sign of significant pathology in the pancreatic head, genu of the pancreas, pancreatic body and pancreatic tail.  Transgastric fine-needle biopsy performed  There was no evidence of significant pathology in the visualized portion of the liver  There was no sign of significant pathology in the gallbladder body.     ERCP 10/23/2024  The major papilla appeared normal  The lower third of the main bile duct was moderately dilated, with an obstruction  Biliary sphincterectomy was performed  The biliary tree was swept and nothing was found  1 biliary stent was placed into the common bile duct      EUS: 2024-  Impression  Esophagitis  Large amount of food residue in the stomach  600 mL was suctioned out during procedure  Nonbleeding duodenal ulcer biopsied  1 stent was visualized and  HAROON Baird CNP   bisacodyl (DULCOLAX) 10 MG suppository Place 1 suppository rectally daily as needed for Constipation  Oli Durham APRN - CNP   polyethylene glycol (GLYCOLAX) 17 g packet Take 1 packet by mouth daily Hold if having multiple bowel movements per day  Oli Durham APRN - CNP   finasteride (PROSCAR) 5 MG tablet Take 1 tablet by mouth daily  Oli Durham APRN - CNP   glucose monitoring kit 1 kit by Does not apply route daily  Oli Durham APRN - CNP   metoclopramide (REGLAN) 5 MG/5ML solution Take 5 mLs by mouth in the morning, at noon, in the evening, and at bedtime  Oli Durham APRN - CNP   ondansetron (ZOFRAN-ODT) 4 MG disintegrating tablet Take 1 tablet by mouth every 8 hours as needed for Nausea or Vomiting  Oli Durham APRN - CNP   triamcinolone (ARISTOCORT) 0.5 % cream Apply topically 3 times daily.  Candelario Pastrana MD        [facility] Medications:  Current Facility-Administered Medications: albuterol (PROVENTIL) (2.5 MG/3ML) 0.083% nebulizer solution 2.5 mg, 2.5 mg, Nebulization, Q2H PRN  cefTRIAXone (ROCEPHIN) 1,000 mg in sterile water 10 mL IV syringe, 1,000 mg, IntraVENous, Q24H **AND** azithromycin (ZITHROMAX) 500 mg in sodium chloride 0.9 % 250 mL IVPB (Zefc9Pie), 500 mg, IntraVENous, Q24H  guaiFENesin (MUCINEX) extended release tablet 600 mg, 600 mg, Oral, BID PRN  pantoprazole (PROTONIX) 40 mg in sodium chloride (PF) 0.9 % 10 mL injection, 40 mg, IntraVENous, Q12H  heparin (porcine) injection 5,600 Units, 80 Units/kg, IntraVENous, PRN  heparin (porcine) injection 2,800 Units, 40 Units/kg, IntraVENous, PRN  heparin 25,000 units in dextrose 5% 250 mL (premix) infusion, 5-30 Units/kg/hr, IntraVENous, Continuous  metoclopramide (REGLAN) 10 MG/10ML solution 5 mg, 5 mg, Per NG tube, 4x daily  ondansetron (ZOFRAN-ODT) disintegrating tablet 4 mg, 4 mg, Per NG tube, Q8H PRN  sodium chloride flush 0.9 % injection 5-40 mL, 5-40 mL, IntraVENous, 2 times per day  sodium chloride flush 0.9 %

## 2024-12-27 NOTE — PROGRESS NOTES
4 Eyes Skin Assessment     NAME:  Edward Barlow  YOB: 1950  MEDICAL RECORD NUMBER:  [id number]    The patient is being assessed for  Admission    I agree that at least one RN has performed a thorough Head to Toe Skin Assessment on the patient. ALL assessment sites listed below have been assessed.      Areas assessed by both nurses:    Head, Face, Ears, Shoulders, Back, Chest, Arms, Elbows, Hands, Sacrum. Buttock, Coccyx, Ischium, Legs. Feet and Heels, and Under Medical Devices         Does the Patient have a Wound? No noted wound(s)       Robin Prevention initiated by RN: Yes  Wound Care Orders initiated by RN: No    Pressure Injury (Stage 3,4, Unstageable, DTI, NWPT, and Complex wounds) if present, place Wound referral order by RN under : No    New Ostomies, if present place, Ostomy referral order under : No     Nurse 1 eSignature: Electronically signed by Allie Larson RN on 12/27/24 at 1:26 AM EST    **SHARE this note so that the co-signing nurse can place an eSignature**    Nurse 2 eSignature: Electronically signed by Rebecca Cordova RN on 12/27/24 at 1:27 AM EST

## 2024-12-28 VITALS
HEART RATE: 73 BPM | RESPIRATION RATE: 16 BRPM | DIASTOLIC BLOOD PRESSURE: 70 MMHG | WEIGHT: 154 LBS | SYSTOLIC BLOOD PRESSURE: 116 MMHG | BODY MASS INDEX: 22.05 KG/M2 | OXYGEN SATURATION: 97 % | HEIGHT: 70 IN | TEMPERATURE: 97.7 F

## 2024-12-28 LAB
ALBUMIN SERPL-MCNC: 2.4 G/DL (ref 3.4–5)
ALBUMIN/GLOB SERPL: 0.8 {RATIO}
ALP SERPL-CCNC: 59 U/L (ref 40–129)
ALT SERPL-CCNC: 20 U/L (ref 10–40)
ANION GAP SERPL CALCULATED.3IONS-SCNC: 9 MMOL/L (ref 3–16)
AST SERPL-CCNC: 23 U/L (ref 15–37)
BASOPHILS # BLD: 0.02 K/UL (ref 0–0.2)
BASOPHILS NFR BLD: 0 %
BILIRUB SERPL-MCNC: <0.2 MG/DL (ref 0–1)
BUN SERPL-MCNC: 20 MG/DL (ref 7–20)
CALCIUM SERPL-MCNC: 9.3 MG/DL (ref 8.3–10.6)
CHLORIDE SERPL-SCNC: 106 MMOL/L (ref 99–110)
CO2 SERPL-SCNC: 27 MMOL/L (ref 21–32)
CREAT SERPL-MCNC: 0.6 MG/DL (ref 0.8–1.3)
EOSINOPHIL # BLD: 0.11 K/UL (ref 0–0.6)
EOSINOPHILS RELATIVE PERCENT: 1 %
ERYTHROCYTE [DISTWIDTH] IN BLOOD BY AUTOMATED COUNT: 15.2 % (ref 12.4–15.4)
GFR, ESTIMATED: >90 ML/MIN/1.73M2
GLUCOSE SERPL-MCNC: 136 MG/DL (ref 70–99)
HCT VFR BLD AUTO: 29.8 % (ref 40.5–52.5)
HGB BLD-MCNC: 9.5 G/DL (ref 13.5–17.5)
IMM GRANULOCYTES # BLD AUTO: 0.05 K/UL (ref 0–0.5)
IMM GRANULOCYTES NFR BLD: 0 %
LYMPHOCYTES NFR BLD: 2.43 K/UL (ref 1–5.1)
LYMPHOCYTES RELATIVE PERCENT: 21 %
MAGNESIUM SERPL-MCNC: 2.1 MG/DL (ref 1.8–2.4)
MCH RBC QN AUTO: 28.9 PG (ref 26–34)
MCHC RBC AUTO-ENTMCNC: 31.9 G/DL (ref 31–36)
MCV RBC AUTO: 90.6 FL (ref 80–100)
MONOCYTES NFR BLD: 0.69 K/UL (ref 0–1.3)
MONOCYTES NFR BLD: 6 %
NEUTROPHILS NFR BLD: 72 %
NEUTS SEG NFR BLD: 8.49 K/UL (ref 1.7–7.7)
PHOSPHATE SERPL-MCNC: 2.7 MG/DL (ref 2.5–4.9)
PLATELET # BLD AUTO: 272 K/UL (ref 135–450)
PMV BLD AUTO: 10.3 FL
POTASSIUM SERPL-SCNC: 3.5 MMOL/L (ref 3.5–5.1)
PROT SERPL-MCNC: 5.4 G/DL (ref 6.4–8.2)
RBC # BLD AUTO: 3.29 M/UL (ref 4.2–5.9)
SODIUM SERPL-SCNC: 141 MMOL/L (ref 136–145)
WBC OTHER # BLD: 11.8 K/UL (ref 4–11)

## 2024-12-28 PROCEDURE — 2580000003 HC RX 258: Performed by: STUDENT IN AN ORGANIZED HEALTH CARE EDUCATION/TRAINING PROGRAM

## 2024-12-28 PROCEDURE — 6370000000 HC RX 637 (ALT 250 FOR IP): Performed by: STUDENT IN AN ORGANIZED HEALTH CARE EDUCATION/TRAINING PROGRAM

## 2024-12-28 PROCEDURE — 85025 COMPLETE CBC W/AUTO DIFF WBC: CPT

## 2024-12-28 PROCEDURE — 6370000000 HC RX 637 (ALT 250 FOR IP): Performed by: HOSPITALIST

## 2024-12-28 PROCEDURE — 84100 ASSAY OF PHOSPHORUS: CPT

## 2024-12-28 PROCEDURE — 84478 ASSAY OF TRIGLYCERIDES: CPT

## 2024-12-28 PROCEDURE — 6360000002 HC RX W HCPCS: Performed by: STUDENT IN AN ORGANIZED HEALTH CARE EDUCATION/TRAINING PROGRAM

## 2024-12-28 PROCEDURE — 83735 ASSAY OF MAGNESIUM: CPT

## 2024-12-28 PROCEDURE — 36415 COLL VENOUS BLD VENIPUNCTURE: CPT

## 2024-12-28 PROCEDURE — 80053 COMPREHEN METABOLIC PANEL: CPT

## 2024-12-28 PROCEDURE — 2500000003 HC RX 250 WO HCPCS: Performed by: STUDENT IN AN ORGANIZED HEALTH CARE EDUCATION/TRAINING PROGRAM

## 2024-12-28 RX ORDER — AZITHROMYCIN 250 MG/1
250 TABLET, FILM COATED ORAL DAILY
Qty: 3 TABLET | Refills: 0 | Status: ON HOLD | OUTPATIENT
Start: 2024-12-28

## 2024-12-28 RX ADMIN — APIXABAN 10 MG: 5 TABLET, FILM COATED ORAL at 10:14

## 2024-12-28 RX ADMIN — Medication 10 ML: at 10:15

## 2024-12-28 RX ADMIN — AZITHROMYCIN MONOHYDRATE 500 MG: 500 INJECTION, POWDER, LYOPHILIZED, FOR SOLUTION INTRAVENOUS at 01:54

## 2024-12-28 RX ADMIN — SODIUM CHLORIDE 40 MG: 9 INJECTION INTRAMUSCULAR; INTRAVENOUS; SUBCUTANEOUS at 06:57

## 2024-12-28 RX ADMIN — METOCLOPRAMIDE HYDROCHLORIDE 5 MG: 5 SOLUTION ORAL at 17:02

## 2024-12-28 RX ADMIN — WATER 1000 MG: 1 INJECTION INTRAMUSCULAR; INTRAVENOUS; SUBCUTANEOUS at 01:54

## 2024-12-28 RX ADMIN — METOCLOPRAMIDE HYDROCHLORIDE 5 MG: 5 SOLUTION ORAL at 10:14

## 2024-12-28 ASSESSMENT — PAIN SCALES - GENERAL
PAINLEVEL_OUTOF10: 0

## 2024-12-28 NOTE — PROGRESS NOTES
GLUCOSE:  No results for input(s): \"POCGLU\" in the last 72 hours.  No results for input(s): \"LABA1C\" in the last 72 hours. No results found for: \"LABA1C\"    estimated EF of 55 - 60%.        CTA chest with right lower lobe pulmonary embolus with straightening of ventricular septum and mildly elevated RV to LV ratio.     ASSESSMENT AND PLAN    Acute right-sided pulmonary embolism:  Change heparin drip to Eliquis  Cardiology consult is appreciated  No need for pulmonary thrombectomy  Echocardiogram is unremarkable       Bibasilar infiltrates of the lungs:   Likely pneumonia, procalcitonin was 0.3.    Will treat as community-acquired pneumonia, started on Rocephin + azithromycin.    Provide antitussives and breathing treatments as needed.     Chronic pancreatitis with necrosis:   NG tube placed on 12/9/2024 during last hospitalization.  Continue tube feeds and clear liquid diet. Continue to hold PTA medications. Consult GI.     Gastric distention recent duodenal ulcer:   Gastric fluid distention noted on CT today.    Continue Protonix twice daily and Reglan.    GI consult is appreciated     Class III malnutrition:   Continue tube feeds Vital AF at 30 cc/h, advance to goal of 65 cc an hour.  Dietitian consulted     Hypertension:   Normotensive despite being off home antihypertensives, continue to hold.  Monitor     History of TIA  : Continue holding home aspirin     History of constipation:   Now resolved, patient with loose watery stools at home, hold MiraLAX        Code Status: Full Code        Dispo - cc        The patient and / or the family were informed of the results of any tests, a time was given to answer questions, a plan was proposed and they agreed with plan.    REYNOLD WORTHINGTON MD    This note was transcribed using Dragon Dictation software. Please disregard any translational errors.

## 2024-12-28 NOTE — CARE COORDINATION
DISCHARGE ORDER  Date/Time 2024 3:11 PM  Completed by: Mai Bird, Case Management    Patient Name: Edward Barlow      : 1950  Admitting Diagnosis: Acute pulmonary embolism, unspecified pulmonary embolism type, unspecified whether acute cor pulmonale present (HCC) [I26.99]  Pulmonary embolism, unspecified chronicity, unspecified pulmonary embolism type, unspecified whether acute cor pulmonale present (HCC) [I26.99]      Admit order Date and Status:24  (verify MD's last order for status of admission)      Noted discharge order.     Confirmed discharge plan : Yes  with patient     Discharge Plan: Pt to dc home with wife.  NJ tube in place. New TF orders [facility].  New formula to be delivered this evening to patients home.  [facility] to visit patient on Monday.      Home Care Information:   Is patient resuming current home health care services: Yes     Home Care Agency:   [facility] ([facility])  [address] [id number]  Phone: [phone]  Fax: [phone]  Services: pt/ot/sn  Home Health Order Obtained: Y    Home health agency notified of discharge.          Reviewed chart.  Role of discharge planner explained and patient verbalized understanding. Discharge order is noted.    Has Home O2 in place on admit:  No  Informed of need to bring portable home O2 tank on day of discharge for nursing to connect prior to leaving:   Not Indicated  Verbalized agreement/Understanding:   Not Indicated  Pt is being d/c'd to home today. Pt's O2 sats are 97% on ra.    Discharge timeout done with CM/Pt/RN. All discharge needs and concerns addressed.

## 2024-12-28 NOTE — PLAN OF CARE
Problem: Discharge Planning  Goal: Discharge to home or other facility with appropriate resources  12/28/2024 0117 by Cecilia Gonzales RN  Outcome: Progressing  12/27/2024 1156 by Miguelina Andrade RN  Outcome: Progressing     Problem: Pain  Goal: Verbalizes/displays adequate comfort level or baseline comfort level  12/28/2024 0117 by Cecilia Gonzales RN  Outcome: Progressing  12/27/2024 1156 by Miguelina Andrade RN  Outcome: Progressing     Problem: Safety - Adult  Goal: Free from fall injury  12/28/2024 0117 by Cecilia Gonzales RN  Outcome: Progressing  12/27/2024 1156 by Miguelina Andrade RN  Outcome: Progressing     Problem: ABCDS Injury Assessment  Goal: Absence of physical injury  12/28/2024 0117 by Cecilia Gonzales RN  Outcome: Progressing  12/27/2024 1156 by Miguelina Andrade RN  Outcome: Progressing     Problem: Skin/Tissue Integrity  Goal: Absence of new skin breakdown  Description: 1.  Monitor for areas of redness and/or skin breakdown  2.  Assess vascular access sites hourly  3.  Every 4-6 hours minimum:  Change oxygen saturation probe site  4.  Every 4-6 hours:  If on nasal continuous positive airway pressure, respiratory therapy assess nares and determine need for appliance change or resting period.  12/28/2024 0117 by Cecilia Gonzales RN  Outcome: Progressing  12/27/2024 1156 by Miguelina Andrade RN  Outcome: Progressing     Problem: Nutrition Deficit:  Goal: Optimize nutritional status  12/28/2024 0117 by Cecilia Gonzales RN  Outcome: Progressing  12/27/2024 1156 by Miguelina Andrade RN  Outcome: Progressing

## 2024-12-28 NOTE — CARE COORDINATION
Case Management Note:    Pt is to be discharged home on new recommended tube feedings.  NJ tube remains in place.     Nutrition Recommendations/Plan:   Continue Clear liquid diet.  Change Vital AF to Jevity 1.5 and monitor tolerance.  Recommend Jevity 1.5 with goal rate of 65 mL/hr.    CM called [facility] pharmacy and confirmed new orders.  Tube feedings will be delivered to patients home today 12/28/24.     [facility] is scheduled to see patient on Monday 12/30/24.         [facility]  [address] [place] [address]  Phone: [phone]  Fax: [phone]       [facility] ([facility])  [address]   [facility] [address]  Phone: [phone]  Fax: [phone]

## 2024-12-28 NOTE — DISCHARGE INSTR - COC
Continuity of Care Form    Patient Name: Edward Barlow   :  1950  MRN:  [id number]    Admit date:  2024  Discharge date:  ***    Code Status Order: Full Code   Advance Directives:   Advance Care Flowsheet Documentation             Admitting Physician:  Faith Dominguez MD  PCP: Ellis Ordonez MD    Discharging Nurse: ***  Discharging Hospital Unit/Room#: 3211/3211-01  Discharging Unit Phone Number: ***    Emergency Contact:   Extended Emergency Contact Information  Primary Emergency Contact: Ольга Barlow  Address: [address] [place] [address]  Home Phone: [phone]  Mobile Phone: [phone]  Relation: Spouse    Past Surgical History:  Past Surgical History:   Procedure Laterality Date    ERCP N/A 10/23/2024    ENDOSCOPIC RETROGRADE CHOLANGIOPANCREATOGRAPHY SPHINCTER/PAPILLOTOMY performed by Tom Dias MD at [facility] ENDOSCOPY    ERCP N/A 10/23/2024    ENDOSCOPIC RETROGRADE CHOLANGIOPANCREATOGRAPHY STONE REMOVAL performed by Tom Dias MD at [facility] ENDOSCOPY    ERCP N/A 10/23/2024    ENDOSCOPIC RETROGRADE CHOLANGIOPANCREATOGRAPHY STENT INSERTION performed by Tom Dias MD at [facility] ENDOSCOPY    MASTECTOMY      double    TUMOR REMOVAL  2014    positive bladder tumor    UPPER GASTROINTESTINAL ENDOSCOPY N/A 10/23/2024    ENDOSCOPIC ULTRASOUND with biopsy performed by Tom Dias MD at [facility] ENDOSCOPY    UPPER GASTROINTESTINAL ENDOSCOPY N/A 2024    ESOPHAGOGASTRODUODENOSCOPY SUBMUCOSAL INJECTION performed by Tom Dias MD at [facility] ENDOSCOPY    UPPER GASTROINTESTINAL ENDOSCOPY N/A 2024    ESOPHAGOGASTRODUODENOSCOPY WITH NASAL-JEJUNAL TUBE PLACEMENT performed by Tom Dias MD at [facility] ENDOSCOPY    VASECTOMY         Immunization History:   Immunization History   Administered Date(s) Administered    COVID-19, MODERNA BLUE border, Primary or Immunocompromised, (age 12y+), IM, 100 mcg/0.5mL 2021, 2021    COVID-19, PFIZER Bivalent, DO NOT

## 2024-12-28 NOTE — PLAN OF CARE
Problem: Discharge Planning  Goal: Discharge to home or other facility with appropriate resources  12/28/2024 1815 by Tari Hill RN  Outcome: Adequate for Discharge

## 2024-12-28 NOTE — PLAN OF CARE
Problem: Discharge Planning  Goal: Discharge to home or other facility with appropriate resources  12/28/2024 1114 by Tari Hill RN  Outcome: Progressing     Problem: Pain  Goal: Verbalizes/displays adequate comfort level or baseline comfort level  12/28/2024 1114 by Tari Hill RN  Outcome: Progressing     Problem: Safety - Adult  Goal: Free from fall injury  12/28/2024 1114 by Tari Hill RN  Outcome: Progressing     Problem: ABCDS Injury Assessment  Goal: Absence of physical injury  12/28/2024 1114 by Tari Hill RN  Outcome: Progressing     Problem: Skin/Tissue Integrity  Goal: Absence of new skin breakdown  Description: 1.  Monitor for areas of redness and/or skin breakdown  2.  Assess vascular access sites hourly  3.  Every 4-6 hours minimum:  Change oxygen saturation probe site  4.  Every 4-6 hours:  If on nasal continuous positive airway pressure, respiratory therapy assess nares and determine need for appliance change or resting period.  12/28/2024 1114 by Tari Hill RN  Outcome: Progressing     Problem: Nutrition Deficit:  Goal: Optimize nutritional status  12/28/2024 1114 by Tari Hill RN  Outcome: Progressing

## 2025-01-01 NOTE — DISCHARGE SUMMARY
Hospital Medicine Discharge Summary      Patient ID: Edward Barlow , [id number]     Patient's PCP: Ellis Ordonez MD    Admit Date: 12/26/2024     Discharge Date: 12/28/2024      Admitting Physician: Faith Dominguez MD    Discharge Physician: REYNOLD WORTHINGTON MD     Discharge Diagnoses:     Active Hospital Problems    Diagnosis Date Noted    Acute pulmonary embolism, unspecified pulmonary embolism type, unspecified whether acute cor pulmonale present (HCC) [I26.99] 12/26/2024    Severe malnutrition (HCC) [E43] 12/10/2024         The patient was seen and examined on the day of discharge and this discharge summary is in conjunction with any daily progress note from day of discharge.          HOSPITAL COURSE    Patient demographics:  The patient  Edward Barlow is a 74 y.o. male     Significant past medical history:   Patient Active Problem List   Diagnosis    Celiac sprue    HTN (hypertension)    History of CVA (cerebrovascular accident) without residual deficits    Benign prostatic hyperplasia    Bladder cancer (HCC)    Acute necrotizing pancreatitis    Pancreatitis, unspecified pancreatitis type    Severe malnutrition (HCC)    Acute pulmonary embolism, unspecified pulmonary embolism type, unspecified whether acute cor pulmonale present (HCC)         Presenting symptoms:      Vitals: /70   Pulse 73   Temp 97.7 °F (36.5 °C) (Oral)   Resp 16   Ht 1.778 m (5' 10\")   Wt 69.9 kg (154 lb)   SpO2 97%   BMI 22.10 kg/m²     Gen:          Alert and oriented x 3   Eyes: PERRL. No sclera icterus. No conjunctival injection.   ENT: No discharge. Pharynx clear. External appearance of ears and nose normal.  Neck: Trachea midline. No obvious mass.    Resp: No accessory muscle use. No crackles. No wheezes. No rhonchi.  CV: Regular rate. Regular rhythm. No murmur or rub. No edema.   GI: Non-tender. Non-distended. No hernia.     Lymph: No cervical LAD. No supraclavicular LAD.   M/S: / Ext. No cyanosis. No clubbing. No

## 2025-01-03 LAB — TRIGL SERPL-MCNC: 115 MG/DL

## 2025-01-04 ENCOUNTER — APPOINTMENT (OUTPATIENT)
Age: 75
End: 2025-01-04
Payer: MEDICARE

## 2025-01-04 ENCOUNTER — HOSPITAL ENCOUNTER (INPATIENT)
Age: 75
LOS: 13 days | Discharge: HOME HEALTH | End: 2025-01-17
Attending: STUDENT IN AN ORGANIZED HEALTH CARE EDUCATION/TRAINING PROGRAM | Admitting: STUDENT IN AN ORGANIZED HEALTH CARE EDUCATION/TRAINING PROGRAM
Payer: MEDICARE

## 2025-01-04 DIAGNOSIS — E86.0 DEHYDRATION: ICD-10-CM

## 2025-01-04 DIAGNOSIS — R14.0 ABDOMINAL DISTENTION: Primary | ICD-10-CM

## 2025-01-04 DIAGNOSIS — K56.609 INTESTINAL OBSTRUCTION, UNSPECIFIED CAUSE, UNSPECIFIED WHETHER PARTIAL OR COMPLETE (HCC): ICD-10-CM

## 2025-01-04 DIAGNOSIS — R11.2 NAUSEA AND VOMITING, UNSPECIFIED VOMITING TYPE: ICD-10-CM

## 2025-01-04 DIAGNOSIS — I99.9 VASCULAR ABNORMALITY: ICD-10-CM

## 2025-01-04 DIAGNOSIS — K85.02 IDIOPATHIC ACUTE PANCREATITIS WITH INFECTED NECROSIS: ICD-10-CM

## 2025-01-04 DIAGNOSIS — N17.9 AKI (ACUTE KIDNEY INJURY) (HCC): ICD-10-CM

## 2025-01-04 DIAGNOSIS — E87.20 LACTIC ACIDOSIS: ICD-10-CM

## 2025-01-04 LAB
ALBUMIN SERPL-MCNC: 3.5 G/DL (ref 3.4–5)
ALBUMIN/GLOB SERPL: 0.8 {RATIO}
ALP SERPL-CCNC: 96 U/L (ref 40–129)
ALT SERPL-CCNC: 40 U/L (ref 10–40)
ANION GAP SERPL CALCULATED.3IONS-SCNC: 16 MMOL/L (ref 3–16)
ANTI-XA UNFRAC HEPARIN: >1.1 IU/L (ref 0.3–0.7)
AST SERPL-CCNC: 31 U/L (ref 15–37)
BACTERIA URNS QL MICRO: ABNORMAL
BASOPHILS # BLD: 0.02 K/UL (ref 0–0.2)
BASOPHILS NFR BLD: 0 %
BILIRUB DIRECT SERPL-MCNC: 0.3 MG/DL (ref 0–0.3)
BILIRUB INDIRECT SERPL-MCNC: 0.3 MG/DL (ref 0–1)
BILIRUB SERPL-MCNC: 0.6 MG/DL (ref 0–1)
BILIRUB UR QL STRIP: NEGATIVE
BUN SERPL-MCNC: 19 MG/DL (ref 7–20)
CALCIUM SERPL-MCNC: 11.2 MG/DL (ref 8.3–10.6)
CASTS #/AREA URNS LPF: ABNORMAL /LPF
CHARACTER UR: ABNORMAL
CHLORIDE SERPL-SCNC: 98 MMOL/L (ref 99–110)
CLARITY UR: CLEAR
CO2 SERPL-SCNC: 20 MMOL/L (ref 21–32)
COLOR UR: YELLOW
CREAT SERPL-MCNC: 1.4 MG/DL (ref 0.8–1.3)
CRYSTALS URNS MICRO: ABNORMAL /HPF
EOSINOPHIL # BLD: 0.02 K/UL (ref 0–0.6)
EOSINOPHILS RELATIVE PERCENT: 0 %
EPI CELLS #/AREA URNS HPF: ABNORMAL /HPF
ERYTHROCYTE [DISTWIDTH] IN BLOOD BY AUTOMATED COUNT: 15.3 % (ref 12.4–15.4)
GFR, ESTIMATED: 55 ML/MIN/1.73M2
GLUCOSE BLD-MCNC: 142 MG/DL (ref 70–99)
GLUCOSE SERPL-MCNC: 216 MG/DL (ref 70–99)
GLUCOSE UR STRIP-MCNC: NEGATIVE MG/DL
HCT VFR BLD AUTO: 45 % (ref 40.5–52.5)
HGB BLD-MCNC: 14.4 G/DL (ref 13.5–17.5)
HGB UR QL STRIP.AUTO: ABNORMAL
IMM GRANULOCYTES # BLD AUTO: 0.14 K/UL (ref 0–0.5)
IMM GRANULOCYTES NFR BLD: 1 %
INR PPP: 1.5 (ref 0.9–1.2)
KETONES UR STRIP-MCNC: NEGATIVE MG/DL
LACTATE BLDV-SCNC: 2.7 MMOL/L (ref 0.4–1.9)
LACTATE BLDV-SCNC: 3 MMOL/L (ref 0.4–2)
LACTATE BLDV-SCNC: 3.9 MMOL/L (ref 0.4–1.9)
LEUKOCYTE ESTERASE UR QL STRIP: NEGATIVE
LIPASE SERPL-CCNC: 14 U/L (ref 13–60)
LYMPHOCYTES NFR BLD: 2.73 K/UL (ref 1–5.1)
LYMPHOCYTES RELATIVE PERCENT: 15 %
MAGNESIUM SERPL-MCNC: 2.2 MG/DL (ref 1.8–2.4)
MCH RBC QN AUTO: 28.3 PG (ref 26–34)
MCHC RBC AUTO-ENTMCNC: 32 G/DL (ref 31–36)
MCV RBC AUTO: 88.4 FL (ref 80–100)
MONOCYTES NFR BLD: 0.54 K/UL (ref 0–1.3)
MONOCYTES NFR BLD: 3 %
MUCOUS THREADS URNS QL MICRO: PRESENT
NEUTROPHILS NFR BLD: 81 %
NEUTS SEG NFR BLD: 15.12 K/UL (ref 1.7–7.7)
NITRITE UR QL STRIP: NEGATIVE
PARTIAL THROMBOPLASTIN TIME: 27.3 SEC (ref 22.1–36.4)
PH UR STRIP: 6 [PH] (ref 5–8)
PLATELET # BLD AUTO: 433 K/UL (ref 135–450)
PMV BLD AUTO: 9.9 FL
POTASSIUM SERPL-SCNC: 4.8 MMOL/L (ref 3.5–5.1)
PROT SERPL-MCNC: 8 G/DL (ref 6.4–8.2)
PROT UR STRIP-MCNC: 30 MG/DL
PROTHROMBIN TIME: 18.6 SEC (ref 11.9–14.9)
RBC # BLD AUTO: 5.09 M/UL (ref 4.2–5.9)
RBC #/AREA URNS HPF: ABNORMAL /HPF
SODIUM SERPL-SCNC: 134 MMOL/L (ref 136–145)
SP GR UR STRIP: >1.03 (ref 1–1.03)
UROBILINOGEN UR STRIP-ACNC: 1 EU/DL (ref 0–1)
WBC #/AREA URNS HPF: ABNORMAL /HPF
WBC OTHER # BLD: 18.6 K/UL (ref 4–11)

## 2025-01-04 PROCEDURE — 2580000003 HC RX 258: Performed by: EMERGENCY MEDICINE

## 2025-01-04 PROCEDURE — 80053 COMPREHEN METABOLIC PANEL: CPT

## 2025-01-04 PROCEDURE — 83735 ASSAY OF MAGNESIUM: CPT

## 2025-01-04 PROCEDURE — 81001 URINALYSIS AUTO W/SCOPE: CPT

## 2025-01-04 PROCEDURE — 85610 PROTHROMBIN TIME: CPT

## 2025-01-04 PROCEDURE — 82248 BILIRUBIN DIRECT: CPT

## 2025-01-04 PROCEDURE — 85520 HEPARIN ASSAY: CPT

## 2025-01-04 PROCEDURE — 2500000003 HC RX 250 WO HCPCS: Performed by: STUDENT IN AN ORGANIZED HEALTH CARE EDUCATION/TRAINING PROGRAM

## 2025-01-04 PROCEDURE — 6360000002 HC RX W HCPCS: Performed by: EMERGENCY MEDICINE

## 2025-01-04 PROCEDURE — 36415 COLL VENOUS BLD VENIPUNCTURE: CPT

## 2025-01-04 PROCEDURE — 83690 ASSAY OF LIPASE: CPT

## 2025-01-04 PROCEDURE — 85730 THROMBOPLASTIN TIME PARTIAL: CPT

## 2025-01-04 PROCEDURE — 99285 EMERGENCY DEPT VISIT HI MDM: CPT

## 2025-01-04 PROCEDURE — 87040 BLOOD CULTURE FOR BACTERIA: CPT

## 2025-01-04 PROCEDURE — 87086 URINE CULTURE/COLONY COUNT: CPT

## 2025-01-04 PROCEDURE — 2060000000 HC ICU INTERMEDIATE R&B

## 2025-01-04 PROCEDURE — 6360000002 HC RX W HCPCS: Performed by: STUDENT IN AN ORGANIZED HEALTH CARE EDUCATION/TRAINING PROGRAM

## 2025-01-04 PROCEDURE — 82962 GLUCOSE BLOOD TEST: CPT

## 2025-01-04 PROCEDURE — 83605 ASSAY OF LACTIC ACID: CPT

## 2025-01-04 PROCEDURE — 85025 COMPLETE CBC W/AUTO DIFF WBC: CPT

## 2025-01-04 PROCEDURE — 96374 THER/PROPH/DIAG INJ IV PUSH: CPT

## 2025-01-04 PROCEDURE — 71045 X-RAY EXAM CHEST 1 VIEW: CPT

## 2025-01-04 PROCEDURE — 2580000003 HC RX 258: Performed by: STUDENT IN AN ORGANIZED HEALTH CARE EDUCATION/TRAINING PROGRAM

## 2025-01-04 PROCEDURE — 74177 CT ABD & PELVIS W/CONTRAST: CPT

## 2025-01-04 PROCEDURE — 6360000004 HC RX CONTRAST MEDICATION: Performed by: STUDENT IN AN ORGANIZED HEALTH CARE EDUCATION/TRAINING PROGRAM

## 2025-01-04 RX ORDER — IOPAMIDOL 755 MG/ML
75 INJECTION, SOLUTION INTRAVASCULAR
Status: COMPLETED | OUTPATIENT
Start: 2025-01-04 | End: 2025-01-04

## 2025-01-04 RX ORDER — HEPARIN SODIUM 1000 [USP'U]/ML
4000 INJECTION, SOLUTION INTRAVENOUS; SUBCUTANEOUS ONCE
Status: COMPLETED | OUTPATIENT
Start: 2025-01-04 | End: 2025-01-04

## 2025-01-04 RX ORDER — POTASSIUM CHLORIDE 7.45 MG/ML
10 INJECTION INTRAVENOUS PRN
Status: DISCONTINUED | OUTPATIENT
Start: 2025-01-04 | End: 2025-01-17 | Stop reason: HOSPADM

## 2025-01-04 RX ORDER — HEPARIN SODIUM 1000 [USP'U]/ML
2000 INJECTION, SOLUTION INTRAVENOUS; SUBCUTANEOUS PRN
Status: DISCONTINUED | OUTPATIENT
Start: 2025-01-04 | End: 2025-01-15

## 2025-01-04 RX ORDER — BISACODYL 10 MG
10 SUPPOSITORY, RECTAL RECTAL DAILY PRN
Status: DISCONTINUED | OUTPATIENT
Start: 2025-01-04 | End: 2025-01-09

## 2025-01-04 RX ORDER — ONDANSETRON 2 MG/ML
4 INJECTION INTRAMUSCULAR; INTRAVENOUS ONCE
Status: COMPLETED | OUTPATIENT
Start: 2025-01-04 | End: 2025-01-04

## 2025-01-04 RX ORDER — DROPERIDOL 2.5 MG/ML
0.62 INJECTION, SOLUTION INTRAMUSCULAR; INTRAVENOUS EVERY 6 HOURS PRN
Status: DISCONTINUED | OUTPATIENT
Start: 2025-01-04 | End: 2025-01-17 | Stop reason: HOSPADM

## 2025-01-04 RX ORDER — PANTOPRAZOLE SODIUM 40 MG/1
40 TABLET, DELAYED RELEASE ORAL 2 TIMES DAILY
Status: ON HOLD | COMMUNITY
End: 2025-01-17

## 2025-01-04 RX ORDER — ACETAMINOPHEN 325 MG/1
650 TABLET ORAL EVERY 6 HOURS PRN
Status: DISCONTINUED | OUTPATIENT
Start: 2025-01-04 | End: 2025-01-17 | Stop reason: HOSPADM

## 2025-01-04 RX ORDER — SODIUM CHLORIDE 9 MG/ML
INJECTION, SOLUTION INTRAVENOUS CONTINUOUS
Status: ACTIVE | OUTPATIENT
Start: 2025-01-04 | End: 2025-01-05

## 2025-01-04 RX ORDER — SODIUM CHLORIDE 0.9 % (FLUSH) 0.9 %
5-40 SYRINGE (ML) INJECTION PRN
Status: DISCONTINUED | OUTPATIENT
Start: 2025-01-04 | End: 2025-01-17 | Stop reason: HOSPADM

## 2025-01-04 RX ORDER — 0.9 % SODIUM CHLORIDE 0.9 %
1000 INTRAVENOUS SOLUTION INTRAVENOUS ONCE
Status: COMPLETED | OUTPATIENT
Start: 2025-01-04 | End: 2025-01-04

## 2025-01-04 RX ORDER — ONDANSETRON 2 MG/ML
4 INJECTION INTRAMUSCULAR; INTRAVENOUS EVERY 6 HOURS PRN
Status: DISCONTINUED | OUTPATIENT
Start: 2025-01-04 | End: 2025-01-17 | Stop reason: HOSPADM

## 2025-01-04 RX ORDER — HEPARIN SODIUM 10000 [USP'U]/100ML
5-30 INJECTION, SOLUTION INTRAVENOUS CONTINUOUS
Status: DISCONTINUED | OUTPATIENT
Start: 2025-01-04 | End: 2025-01-15

## 2025-01-04 RX ORDER — GLUCAGON 1 MG/ML
1 KIT INJECTION PRN
Status: DISCONTINUED | OUTPATIENT
Start: 2025-01-04 | End: 2025-01-17 | Stop reason: HOSPADM

## 2025-01-04 RX ORDER — ONDANSETRON 4 MG/1
4 TABLET, ORALLY DISINTEGRATING ORAL EVERY 8 HOURS PRN
Status: DISCONTINUED | OUTPATIENT
Start: 2025-01-04 | End: 2025-01-17 | Stop reason: HOSPADM

## 2025-01-04 RX ORDER — SODIUM CHLORIDE 0.9 % (FLUSH) 0.9 %
5-40 SYRINGE (ML) INJECTION EVERY 12 HOURS SCHEDULED
Status: DISCONTINUED | OUTPATIENT
Start: 2025-01-04 | End: 2025-01-17 | Stop reason: HOSPADM

## 2025-01-04 RX ORDER — INSULIN LISPRO 100 [IU]/ML
0-4 INJECTION, SOLUTION INTRAVENOUS; SUBCUTANEOUS
Status: DISCONTINUED | OUTPATIENT
Start: 2025-01-04 | End: 2025-01-08

## 2025-01-04 RX ORDER — HEPARIN SODIUM 1000 [USP'U]/ML
4000 INJECTION, SOLUTION INTRAVENOUS; SUBCUTANEOUS PRN
Status: DISCONTINUED | OUTPATIENT
Start: 2025-01-04 | End: 2025-01-15

## 2025-01-04 RX ORDER — SODIUM CHLORIDE 9 MG/ML
INJECTION, SOLUTION INTRAVENOUS PRN
Status: DISCONTINUED | OUTPATIENT
Start: 2025-01-04 | End: 2025-01-17 | Stop reason: HOSPADM

## 2025-01-04 RX ORDER — POTASSIUM CHLORIDE 1500 MG/1
40 TABLET, EXTENDED RELEASE ORAL PRN
Status: DISCONTINUED | OUTPATIENT
Start: 2025-01-04 | End: 2025-01-17 | Stop reason: HOSPADM

## 2025-01-04 RX ORDER — ACETAMINOPHEN 650 MG/1
650 SUPPOSITORY RECTAL EVERY 6 HOURS PRN
Status: DISCONTINUED | OUTPATIENT
Start: 2025-01-04 | End: 2025-01-17 | Stop reason: HOSPADM

## 2025-01-04 RX ORDER — MAGNESIUM SULFATE IN WATER 40 MG/ML
2000 INJECTION, SOLUTION INTRAVENOUS PRN
Status: DISCONTINUED | OUTPATIENT
Start: 2025-01-04 | End: 2025-01-17 | Stop reason: HOSPADM

## 2025-01-04 RX ORDER — DEXTROSE MONOHYDRATE 100 MG/ML
INJECTION, SOLUTION INTRAVENOUS CONTINUOUS PRN
Status: DISCONTINUED | OUTPATIENT
Start: 2025-01-04 | End: 2025-01-17 | Stop reason: HOSPADM

## 2025-01-04 RX ADMIN — SODIUM CHLORIDE, PRESERVATIVE FREE 10 ML: 5 INJECTION INTRAVENOUS at 22:04

## 2025-01-04 RX ADMIN — SODIUM CHLORIDE 1000 ML: 9 INJECTION, SOLUTION INTRAVENOUS at 21:10

## 2025-01-04 RX ADMIN — PIPERACILLIN AND TAZOBACTAM 3375 MG: 3; .375 INJECTION, POWDER, LYOPHILIZED, FOR SOLUTION INTRAVENOUS at 20:33

## 2025-01-04 RX ADMIN — ONDANSETRON 4 MG: 2 INJECTION, SOLUTION INTRAMUSCULAR; INTRAVENOUS at 19:25

## 2025-01-04 RX ADMIN — IOPAMIDOL 75 ML: 755 INJECTION, SOLUTION INTRAVENOUS at 18:34

## 2025-01-04 RX ADMIN — HEPARIN SODIUM 4000 UNITS: 1000 INJECTION INTRAVENOUS; SUBCUTANEOUS at 21:59

## 2025-01-04 RX ADMIN — HEPARIN SODIUM 12 UNITS/KG/HR: 10000 INJECTION, SOLUTION INTRAVENOUS at 21:59

## 2025-01-04 RX ADMIN — SODIUM CHLORIDE: 9 INJECTION, SOLUTION INTRAVENOUS at 22:19

## 2025-01-04 RX ADMIN — SODIUM CHLORIDE 1000 ML: 9 INJECTION, SOLUTION INTRAVENOUS at 17:43

## 2025-01-04 ASSESSMENT — PAIN - FUNCTIONAL ASSESSMENT: PAIN_FUNCTIONAL_ASSESSMENT: NONE - DENIES PAIN

## 2025-01-04 NOTE — ED PROVIDER NOTES
ED Reassessment Note:     Edward Barlow is a 74 y.o. male who was initially seen by an off going provider and the care has been turned over to me.  Please see the original providers note for details regarding the initial history, physical exam and ED course.      In summary, presents with nausea, vomiting, abdominal pain, decreased appetite.  Recent admission for pancreatitis was discharged with NG tube which was removed a few days ago as it was clogged.  Reports worsening abdominal distention.  Tachycardic to 110, has a leukocytosis of 18  He has been given fluids  Likely admission      At the time of turnover the following steps in the patient's evaluation were pending:   Follow-up labs and CT abdomen and pelvis.  Lactate is 3.9.  BMP shows SHAJI with non-anion gap metabolic acidosis.      CT ABDOMEN PELVIS W IV CONTRAST Additional Contrast? None   Final Result      1.  Evolving pancreatitis with decreased peripancreatic fluid collections and also overall slightly decreased peripancreatic inflammatory soft tissue attenuation compared to CT from 12/26/2024. However, there is interval increased gastric distention, and    soft tissue thickening results in narrowing of the transverse duodenum concerning for partial obstruction.   2.  Subsequent small bowel dilation and additional transition point in the right abdomen representing additional bowel obstruction.   3.  A few foci of extraluminal gas in the peripancreatic region may relate to patient's medication with bowel or infection. No drainable fluid collection.   4.  Small volume ascites.   5.  Decreased conspicuity of pulmonary embolism in the right lower lobe.   6.  Improving airspace consolidations in the lower lobes representing evolving infectious/inflammatory process.   7.  No significant biliary duct dilation with biliary stent in place.      Electronically signed by Paul Rg        Labs Reviewed   CBC WITH AUTO DIFFERENTIAL - Abnormal; Notable for the

## 2025-01-04 NOTE — ED PROVIDER NOTES
by the  ED Provider with the belowfindings:      Interpretation per the Radiologist below, if available at the time of this note:    CT ABDOMEN PELVIS W IV CONTRAST Additional Contrast? None    (Results Pending)     No results found.   No results found.     LABS: (none if blank)  Labs Reviewed   CBC WITH AUTO DIFFERENTIAL   BASIC METABOLIC PANEL W/ REFLEX TO MG FOR LOW K   HEPATIC FUNCTION PANEL   LIPASE   LACTATE, SEPSIS   LACTATE, SEPSIS   MAGNESIUM       (Any cultures that may have been sent were not resulted at the time of this patient visit)    MEDICAL DECISION MAKING / ED COURSE:     External Documentation Reviewed: Previous patient encounter documents & history available on EMR was reviewed:   Record from: .  Patient was seen here on 12/26/2024 for acute pulmonary embolism as well as idiopathic acute pancreatitis in 12/7/2024    Decision Rules/Clinical Scores utilized:               See Formal Diagnostic Results above for the lab and radiology tests and orders.    ED Reassessment:  See ED course           MDM Summary:  History was obtained from: Patient patient's wife and chart review      Is an ill-appearing 74-year-old male had admission in early December for idiopathic acute pancreatitis as well as an admission for pulmonary embolism of 12/26/2024 send on anticoagulation, he had an NG tube secondary to his limited ability to tolerate normal diet.  She has had recurrent episodes of pancreatitis.  Including ERCPs performed with no acute focal finding as of its cause.  His NG tube got clogged it was removed few days ago and since then he had worsening abdominal distention decreased flatus and nausea nonbilious nonbloody emesis today    Labs were initiated as well as imaging for further diagnostic evaluation of possible small bowel obstruction versus ileus or other etiologies.  Patient's care will be transitioned to the nighttime physician for follow-up         Consults (none if blank):        Shared  Decision-Making was performed and disposition discussed with the patient and their questions were answered     Social determinants of health impacting treatment or disposition:  None          Vitals Reviewed:    Vitals:    01/04/25 1724   BP: (!) 125/90   Pulse: (!) 110   Resp: 16   Temp: 97.7 °F (36.5 °C)   TempSrc: Axillary   SpO2: 95%   Weight: 67.4 kg (148 lb 11.2 oz)   Height: 1.778 m (5' 10\")       The patient was seen and examined.    ED Medications administered this visit:  (None if blank)  Medications   sodium chloride 0.9 % bolus 1,000 mL (1,000 mLs IntraVENous New Bag 1/4/25 1134)         Responses to treatment:       PROCEDURES: (None if blank)  Procedures:       CRITICAL CARE: (None if blank)        DISCHARGE PRESCRIPTIONS: (None if blank)  New Prescriptions    No medications on file         I am the primary clinician of record.     FINAL IMPRESSION      1. Abdominal distention    2. Nausea and vomiting, unspecified vomiting type            DISPOSITION/PLAN   DISPOSITION               Condition on disposition: Stable    OUTPATIENT FOLLOW UP THE PATIENT:  No follow-up provider specified.      Electronically Signed: Bao Amos MD, 01/04/25, 5:49 PM    This report has been produced using speech recognition software and may contain errors related to that system including errors in grammar, punctuation, and spelling, as well as words and phrases that may be inappropriate. If there are any questions or concerns please feel free to contact the dictating provider for clarification.       Bao Amos MD  01/04/25 3433

## 2025-01-05 PROBLEM — N17.9 AKI (ACUTE KIDNEY INJURY) (HCC): Status: ACTIVE | Noted: 2025-01-05

## 2025-01-05 PROBLEM — E87.20 LACTIC ACIDOSIS: Status: ACTIVE | Noted: 2025-01-05

## 2025-01-05 PROBLEM — R14.0 ABDOMINAL DISTENTION: Status: ACTIVE | Noted: 2025-01-05

## 2025-01-05 PROBLEM — K86.1 CHRONIC PANCREATITIS (HCC): Status: ACTIVE | Noted: 2024-12-07

## 2025-01-05 LAB
ADDITIONAL COMMENT:: NORMAL
ALBUMIN SERPL-MCNC: 2.8 G/DL (ref 3.4–5)
ALBUMIN/GLOB SERPL: 0.8 {RATIO}
ALP SERPL-CCNC: 68 U/L (ref 40–129)
ALT SERPL-CCNC: 25 U/L (ref 10–40)
ANION GAP SERPL CALCULATED.3IONS-SCNC: 10 MMOL/L (ref 3–16)
AST SERPL-CCNC: 18 U/L (ref 15–37)
BASOPHILS # BLD: 0.02 K/UL (ref 0–0.2)
BASOPHILS NFR BLD: 0 %
BILIRUB SERPL-MCNC: 0.5 MG/DL (ref 0–1)
BUN SERPL-MCNC: 17 MG/DL (ref 7–20)
CALCIUM SERPL-MCNC: 9.7 MG/DL (ref 8.3–10.6)
CHLORIDE SERPL-SCNC: 105 MMOL/L (ref 99–110)
CO2 SERPL-SCNC: 20 MMOL/L (ref 21–32)
CREAT SERPL-MCNC: 1 MG/DL (ref 0.8–1.3)
EOSINOPHIL # BLD: 0 K/UL (ref 0–0.6)
EOSINOPHILS RELATIVE PERCENT: 0 %
ERYTHROCYTE [DISTWIDTH] IN BLOOD BY AUTOMATED COUNT: 15.3 % (ref 12.4–15.4)
GFR, ESTIMATED: 78 ML/MIN/1.73M2
GLUCOSE BLD-MCNC: 124 MG/DL (ref 70–99)
GLUCOSE BLD-MCNC: 132 MG/DL (ref 70–99)
GLUCOSE BLD-MCNC: 90 MG/DL (ref 70–99)
GLUCOSE BLD-MCNC: 99 MG/DL (ref 70–99)
GLUCOSE SERPL-MCNC: 162 MG/DL (ref 70–99)
HCT VFR BLD AUTO: 35.3 % (ref 40.5–52.5)
HGB BLD-MCNC: 11.4 G/DL (ref 13.5–17.5)
IMM GRANULOCYTES # BLD AUTO: 0.17 K/UL (ref 0–0.5)
IMM GRANULOCYTES NFR BLD: 1 %
LACTATE BLDV-SCNC: 1.2 MMOL/L (ref 0.4–2)
LYMPHOCYTES NFR BLD: 3.69 K/UL (ref 1–5.1)
LYMPHOCYTES RELATIVE PERCENT: 11 %
MCH RBC QN AUTO: 28.4 PG (ref 26–34)
MCHC RBC AUTO-ENTMCNC: 32.3 G/DL (ref 31–36)
MCV RBC AUTO: 87.8 FL (ref 80–100)
MICROORGANISM SPEC CULT: NO GROWTH
MONOCYTES NFR BLD: 0.9 K/UL (ref 0–1.3)
MONOCYTES NFR BLD: 3 %
NEUTROPHILS NFR BLD: 86 %
NEUTS SEG NFR BLD: 28.99 K/UL (ref 1.7–7.7)
PARTIAL THROMBOPLASTIN TIME: 37.7 SEC (ref 22.1–36.4)
PARTIAL THROMBOPLASTIN TIME: 53.9 SEC (ref 22.1–36.4)
PARTIAL THROMBOPLASTIN TIME: 97.2 SEC (ref 22.1–36.4)
PLATELET # BLD AUTO: 299 K/UL (ref 135–450)
PMV BLD AUTO: 10.5 FL
POTASSIUM SERPL-SCNC: 4.1 MMOL/L (ref 3.5–5.1)
PROT SERPL-MCNC: 6.1 G/DL (ref 6.4–8.2)
RBC # BLD AUTO: 4.02 M/UL (ref 4.2–5.9)
SODIUM SERPL-SCNC: 136 MMOL/L (ref 136–145)
SPECIMEN DESCRIPTION: NORMAL
WBC OTHER # BLD: 33.8 K/UL (ref 4–11)

## 2025-01-05 PROCEDURE — 36415 COLL VENOUS BLD VENIPUNCTURE: CPT

## 2025-01-05 PROCEDURE — 85730 THROMBOPLASTIN TIME PARTIAL: CPT

## 2025-01-05 PROCEDURE — 2580000003 HC RX 258: Performed by: STUDENT IN AN ORGANIZED HEALTH CARE EDUCATION/TRAINING PROGRAM

## 2025-01-05 PROCEDURE — 99223 1ST HOSP IP/OBS HIGH 75: CPT | Performed by: SURGERY

## 2025-01-05 PROCEDURE — 2500000003 HC RX 250 WO HCPCS: Performed by: STUDENT IN AN ORGANIZED HEALTH CARE EDUCATION/TRAINING PROGRAM

## 2025-01-05 PROCEDURE — 6360000002 HC RX W HCPCS: Performed by: STUDENT IN AN ORGANIZED HEALTH CARE EDUCATION/TRAINING PROGRAM

## 2025-01-05 PROCEDURE — 80053 COMPREHEN METABOLIC PANEL: CPT

## 2025-01-05 PROCEDURE — 82962 GLUCOSE BLOOD TEST: CPT

## 2025-01-05 PROCEDURE — 83036 HEMOGLOBIN GLYCOSYLATED A1C: CPT

## 2025-01-05 PROCEDURE — 83605 ASSAY OF LACTIC ACID: CPT

## 2025-01-05 PROCEDURE — 2060000000 HC ICU INTERMEDIATE R&B

## 2025-01-05 PROCEDURE — 6370000000 HC RX 637 (ALT 250 FOR IP): Performed by: NURSE PRACTITIONER

## 2025-01-05 PROCEDURE — 85025 COMPLETE CBC W/AUTO DIFF WBC: CPT

## 2025-01-05 PROCEDURE — 2580000003 HC RX 258: Performed by: SURGERY

## 2025-01-05 RX ORDER — SODIUM CHLORIDE 9 MG/ML
INJECTION, SOLUTION INTRAVENOUS CONTINUOUS
Status: ACTIVE | OUTPATIENT
Start: 2025-01-06 | End: 2025-01-06

## 2025-01-05 RX ADMIN — PHENOL 1 SPRAY: 1.5 LIQUID ORAL at 09:28

## 2025-01-05 RX ADMIN — SODIUM CHLORIDE: 9 INJECTION, SOLUTION INTRAVENOUS at 18:35

## 2025-01-05 RX ADMIN — SODIUM CHLORIDE: 9 INJECTION, SOLUTION INTRAVENOUS at 09:30

## 2025-01-05 RX ADMIN — SODIUM CHLORIDE: 9 INJECTION, SOLUTION INTRAVENOUS at 10:53

## 2025-01-05 RX ADMIN — HEPARIN SODIUM 20 UNITS/KG/HR: 10000 INJECTION, SOLUTION INTRAVENOUS at 19:33

## 2025-01-05 RX ADMIN — HEPARIN SODIUM 4000 UNITS: 1000 INJECTION INTRAVENOUS; SUBCUTANEOUS at 04:57

## 2025-01-05 RX ADMIN — PIPERACILLIN AND TAZOBACTAM 3375 MG: 3; .375 INJECTION, POWDER, LYOPHILIZED, FOR SOLUTION INTRAVENOUS at 10:53

## 2025-01-05 RX ADMIN — PIPERACILLIN AND TAZOBACTAM 3375 MG: 3; .375 INJECTION, POWDER, LYOPHILIZED, FOR SOLUTION INTRAVENOUS at 02:38

## 2025-01-05 RX ADMIN — HEPARIN SODIUM 4000 UNITS: 1000 INJECTION INTRAVENOUS; SUBCUTANEOUS at 19:33

## 2025-01-05 RX ADMIN — PIPERACILLIN AND TAZOBACTAM 3375 MG: 3; .375 INJECTION, POWDER, LYOPHILIZED, FOR SOLUTION INTRAVENOUS at 18:33

## 2025-01-05 RX ADMIN — SODIUM CHLORIDE, PRESERVATIVE FREE 10 ML: 5 INJECTION INTRAVENOUS at 21:24

## 2025-01-05 RX ADMIN — Medication 40 MG: at 09:16

## 2025-01-05 ASSESSMENT — PAIN DESCRIPTION - PAIN TYPE: TYPE: ACUTE PAIN

## 2025-01-05 ASSESSMENT — PAIN DESCRIPTION - LOCATION: LOCATION: THROAT

## 2025-01-05 ASSESSMENT — PAIN DESCRIPTION - FREQUENCY: FREQUENCY: INTERMITTENT

## 2025-01-05 ASSESSMENT — PAIN SCALES - GENERAL
PAINLEVEL_OUTOF10: 0
PAINLEVEL_OUTOF10: 4

## 2025-01-05 ASSESSMENT — PAIN - FUNCTIONAL ASSESSMENT: PAIN_FUNCTIONAL_ASSESSMENT: ACTIVITIES ARE NOT PREVENTED

## 2025-01-05 ASSESSMENT — PAIN DESCRIPTION - ONSET: ONSET: PROGRESSIVE

## 2025-01-05 ASSESSMENT — PAIN DESCRIPTION - DESCRIPTORS: DESCRIPTORS: SORE

## 2025-01-05 NOTE — ED NOTES
ED to Inpatient Handoff SBAR    Patient Name: Edward Barlow   :  1950  74 y.o.   MRN:  [id number]  Preferred Name    ED Room #:    Family/Caregiver Present yes     Chief Complaint Nausea and Vomiting       Restraints no   Sitter no   Sepsis Risk Score  45  Isolation No active isolations   Fall Risk Assessment Presents to emergency department  because of falls (Syncope, seizure, or loss of consciousness): No, Age > 70: Yes, Altered Mental Status, Intoxication with alcohol or substance confusion (Disorientation, impaired judgment, poor safety awaremess, or inability to follow instructions): No, Impaired Mobility: Ambulates or transfers with assistive devices or assistance; Unable to ambulate or transer.: No, Nursing Judgement: Yes     Situation  Code Status: Prior No additional code details.    Allergies: Gluten meal  Weight: Patient Vitals for the past 96 hrs (Last 3 readings):   Weight   25 1724 67.4 kg (148 lb 11.2 oz)     Arrived from: home  Hospital Problem/Diagnosis:  Principal Problem:    SBO (small bowel obstruction) (HCC)  Resolved Problems:    * No resolved hospital problems. *    Imaging:   CT ABDOMEN PELVIS W IV CONTRAST Additional Contrast? None   Final Result      1.  Evolving pancreatitis with decreased peripancreatic fluid collections and also overall slightly decreased peripancreatic inflammatory soft tissue attenuation compared to CT from 2024. However, there is interval increased gastric distention, and    soft tissue thickening results in narrowing of the transverse duodenum concerning for partial obstruction.   2.  Subsequent small bowel dilation and additional transition point in the right abdomen representing additional bowel obstruction.   3.  A few foci of extraluminal gas in the peripancreatic region may relate to patient's medication with bowel or infection. No drainable fluid collection.   4.  Small volume ascites.   5.  Decreased conspicuity of pulmonary embolism  in the right lower lobe.   6.  Improving airspace consolidations in the lower lobes representing evolving infectious/inflammatory process.   7.  No significant biliary duct dilation with biliary stent in place.      Electronically signed by Paul WILDER CHEST PORTABLE    (Results Pending)     Abnormal labs:   Abnormal Labs Reviewed   CBC WITH AUTO DIFFERENTIAL - Abnormal; Notable for the following components:       Result Value    WBC 18.6 (*)     Immature Granulocytes % 1 (*)     Neutrophils Absolute 15.12 (*)     All other components within normal limits   BASIC METABOLIC PANEL W/ REFLEX TO MG FOR LOW K - Abnormal; Notable for the following components:    Sodium 134 (*)     Chloride 98 (*)     CO2 20 (*)     Glucose 216 (*)     Creatinine 1.4 (*)     Est, Glom Filt Rate 55 (*)     Calcium 11.2 (*)     All other components within normal limits   LACTATE, SEPSIS - Abnormal; Notable for the following components:    Lactic Acid, Sepsis 3.9 (*)     All other components within normal limits   LACTATE, SEPSIS - Abnormal; Notable for the following components:    Lactic Acid, Sepsis 2.7 (*)     All other components within normal limits   URINALYSIS WITH REFLEX TO CULTURE - Abnormal; Notable for the following components:    Specific Gravity, UA >1.030 (*)     Urine Hgb LARGE (*)     Protein, UA 30 (*)     All other components within normal limits   MICROSCOPIC URINALYSIS - Abnormal; Notable for the following components:    WBC, UA 21 TO 50 (*)     RBC, UA 21 TO 50 (*)     Crystals, UA 1+ CALCIUM OXALATE (*)     Bacteria, UA 1+ (*)     Mucus, UA PRESENT (*)     Other Observations UA Urine Reflexed to Culture (*)     All other components within normal limits       Background  History:   Past Medical History:   Diagnosis Date    Bladder cancer (HCC) 07/28/2014    Celiac disease     Hyperlipidemia     Hypertension     TIA (transient ischemic attack)        Assessment    Vitals/MEWS: MEWS Score: 2  Level of

## 2025-01-05 NOTE — PLAN OF CARE
Problem: Discharge Planning  Goal: Discharge to home or other facility with appropriate resources  1/5/2025 1010 by Richelle Yanes RN  Outcome: Progressing  Flowsheets (Taken 1/5/2025 1010)  Discharge to home or other facility with appropriate resources:   Identify barriers to discharge with patient and caregiver   Identify discharge learning needs (meds, wound care, etc)   Arrange for needed discharge resources and transportation as appropriate  1/4/2025 2335 by Louann Epps RN  Outcome: Progressing     Problem: Skin/Tissue Integrity  Goal: Absence of new skin breakdown  Description: 1.  Monitor for areas of redness and/or skin breakdown  2.  Assess vascular access sites hourly  3.  Every 4-6 hours minimum:  Change oxygen saturation probe site  4.  Every 4-6 hours:  If on nasal continuous positive airway pressure, respiratory therapy assess nares and determine need for appliance change or resting period.  1/5/2025 1010 by Richelle Yanes RN  Outcome: Progressing  1/4/2025 2335 by Louann Epps RN  Outcome: Progressing     Problem: ABCDS Injury Assessment  Goal: Absence of physical injury  1/5/2025 1010 by Richelle Yanes RN  Outcome: Progressing  Flowsheets (Taken 1/5/2025 1010)  Absence of Physical Injury: Implement safety measures based on patient assessment  1/4/2025 2335 by Louann Epps RN  Outcome: Progressing     Problem: Safety - Adult  Goal: Free from fall injury  Outcome: Progressing  Flowsheets (Taken 1/5/2025 1010)  Free From Fall Injury: Instruct family/caregiver on patient safety     Problem: Pain  Goal: Verbalizes/displays adequate comfort level or baseline comfort level  Outcome: Progressing  Flowsheets (Taken 1/5/2025 1010)  Verbalizes/displays adequate comfort level or baseline comfort level:   Encourage patient to monitor pain and request assistance   Administer analgesics based on type and severity of pain and evaluate response   Assess pain using appropriate pain scale   Implement

## 2025-01-05 NOTE — CONSULTS
[facility]  GI Consultation                                                                 Patient: Edward Barlow  : 1950       Date:  2025    Subjective:       History of Present Illness  Patient is a 74 y.o.  male admitted with Dehydration [E86.0]  Lactic acidosis [E87.20]  Abdominal distention [R14.0]  SBO (small bowel obstruction) (HCC) [K56.609]  SHAJI (acute kidney injury) (HCC) [N17.9]  Intestinal obstruction, unspecified cause, unspecified whether partial or complete (HCC) [K56.609]  Nausea and vomiting, unspecified vomiting type [R11.2] who is seen in consult for vomiting.  History of bladder cancer s/p TURBT, celiac disease, PUD known to GI service for recent idiopathic necrotizing pancreatitis.  He had required nasojejunal tube placement for nutrition support which was removed last week as he was tolerating oral intake and tube became occluded.  Yesterday, developed onset of recurrent vomiting which was non-bloody but bilious in nature.  He denies associated fevers or chills.  No worsening abdominal pain noted.      GI History:  Upper EUS 10/23/2024  Significant for an irregular heterogeneous mass was identified endosonographically in the lower third of the main bile duct.  The mass measures 7.4 mm x 7.2mm in maximal cross-sectional diameter.  Fine-needle biopsy was performed.  There was a lymph node at the celiac axis.  Fine-needle biopsy was performed.  There was no sign of significant pathology in the pancreatic head, genu of the pancreas, pancreatic body and pancreatic tail.  Transgastric fine-needle biopsy performed  There was no evidence of significant pathology in the visualized portion of the liver  There was no sign of significant pathology in the gallbladder body.     ERCP 10/23/2024  The major papilla appeared normal  The lower third of the main bile duct was moderately dilated, with an obstruction  Biliary sphincterectomy was performed  The biliary tree

## 2025-01-05 NOTE — PROGRESS NOTES
Pharmacy - RE:  Low-dose Heparin drip  Current rate = 12 units/kg/h  aPTT drawn @ 0410 = 37.7 sec. - subtherapeutic  Goal aPTT = 73 - 102 sec.  Verified with the RN, per protocol, give a 4000 unit bolus and increase the heparin drip rate to 16 units/kg/h.  Obtain another aPTT 1/5 @ 1130.

## 2025-01-05 NOTE — PROGRESS NOTES
Pharmacy to Manage Heparin Infusion per Hospital Nomogram    Dx:  Pt wt 64(actual weight)  Baseline aPTT = 27.3 at 2121    Heparin low dose weight based infusion is ordered for patient. Per patient, patient has received an oral Factor Xa inhibitor (Eliquis - last dose 1/4 @ approx 9AM) within the last 72 hours. As oral Factor Xa inhibitors can impact the anti-Xa test calibrated to unfractionated heparin, Heparin infusion will be monitored and adjusted using aPtt's per [facility].   The goal is to allow a washout of oral factor Xa-inhibitors by using aPTT for 72 hours, then change to ant-Xa levels for UFH.     Heparin (weight-based) Infusion: CAD/STEMI/UA/AFIB  Heparin 60 units/kg IVP bolus (max 4000 units) followed by Heparin infusion at 12 units/kg/hr (recommended max initial rate: 1000 units/hr).  Check  aPTT in 6 hours    aPTT < 59                   Heparin 60 units/kg bolus (max 4,000 units)     Increase infusion by 4 units/kg/hr  aPTT 59 - 72.9            Heparin 30 units/kg bolus (max 2,000 units)     Increase infusion by 2 units/kg/hr  aPTT 73 - 102             No bolus                                                    No change   aPTT 102.1 - 109        No bolus                                                    Decrease infusion by 1 units/kg/hr  aPTT 109.1 - 122.9     No bolus                                                    Decrease infusion by 2 units/kg/hr  aPTT 123 or greater    Hold heparin for 1 hour                            Decrease infusion by 3 units/kg/hr    When aPTT  is within target range for two consecutive times, check aPTT once daily with morning labs.

## 2025-01-05 NOTE — H&P
History and Physical      Name:  Edward Barlow /Age/Sex: 1950  (74 y.o. male)   MRN & CSN:  [id number] & [id number] Encounter Date/Time: 2025 7:56 PM EST   Location:   PCP: Ellis Ordonez MD         Date of Exam: 2025       Chief Complaint:     Chief Complaint   Patient presents with    Nausea    Vomiting         Assessment/Plan:     Small bowel obstruction: Noted on imaging and transverse duodenum with an additional transition point + dilatation in the right abdomen.  Keep n.p.o., NGT placed in ED under gentle intermittent suction.  Started on Zosyn.  General surgery consulted with no acute surgical intervention planned.  GI was consulted    Lactic acidosis: Likely secondary to #1 above.  Improved with IV hydration in ED; continue IV NS at 75 cc/h    UTI: Urine cultures pending, started on Zosyn as outlined above.  Provide antiemetics and analgesia as needed.    SHAJI: SCR 1.4 follow labs.  IV fluids as outlined above.  Avoid nephrotoxins, renally dose medications, monitor I's and O's.    Chronic pancreatitis with necrosis: Previously with NG tube that was placed 2024, since discontinued on account of clogging following expansion of diet.  Interval improvement in findings on imaging since 2024 with decreased peripancreatic fluid collections and inflammation.  Few foci of extraluminal gas in peripancreatic region also noted without drainable fluid collection.    Pulmonary embolism: Initially noted 2024.  On Eliquis PTA, placed on heparin gtt as he is NPO for now -- resume Eliquis once on a diet.    Recent community-acquired pneumonia: Interval improvement in bilateral lower lobe consolidation, recent antibiotic course ended 2024.    Medical history also includes class III malnutrition, hypertension, TIA and constipation.  All p.o. medications are being held until 1/15/2025.      Disposition:  Patient was admitted to the hospital    Discussed with Patient    Diet N.p.o.

## 2025-01-05 NOTE — CONSULTS
GEN SURG CONSULT NOTE    A/P:  New problem(s): Chronic pancreatitis, small bowel obstruction  Established problem(s): History of bladder cancer, history of TIA  Additional workup/treatment planned: Continue NG tube to low intermittent wall suction, IV fluid hydration, GI consult  Risk of complications/morbidity: High    Concerning picture for small bowel obstruction in setting of chronic pancreatitis.  Interestingly, does have known stricture of the duodenum, but has a secondary area of narrowing in the ileum.  Fortunately, lactic acidosis has resolved, vitals have normalized, nontender on exam, and subjectively patient feeling much better after IV fluids and NG decompression.    No plans for urgent surgical intervention today.  Will likely need further workup, potential EGD, small bowel follow-through or repeat CT scan.  GI to see as well.    Dr. Garcia to reassess tomorrow.    ---------------------------------------------------------------------------------------------------------------------    CC/Reason for visit: Small bowel obstruction, chronic pancreatitis    HPI -well-known to general surgery and GI services due to acute on chronic pancreatitis.  Has undergone ERCP with stent placement a few months ago.  Has been admitted to the hospital most recently 1 week ago.  Now here with increasing distention, abdominal discomfort.    This morning states he is feeling much better after NG tube placement.  Mild abdominal pain.    Past Medical History:   Diagnosis Date    Bladder cancer (HCC) 07/28/2014    Celiac disease     Hyperlipidemia     Hypertension     TIA (transient ischemic attack)      Past Surgical History:   Procedure Laterality Date    ERCP N/A 10/23/2024    ENDOSCOPIC RETROGRADE CHOLANGIOPANCREATOGRAPHY SPHINCTER/PAPILLOTOMY performed by Tom Dias MD at [facility] ENDOSCOPY    ERCP N/A 10/23/2024    ENDOSCOPIC RETROGRADE CHOLANGIOPANCREATOGRAPHY STONE REMOVAL performed by Tom Dias MD at [facility] ENDOSCOPY

## 2025-01-05 NOTE — PROGRESS NOTES
Hospitalist Progress Note      Name:  Edward Barlow /Age/Sex: 1950  (74 y.o. male)   MRN & CSN:  [id number] & [id number] Encounter Date/Time: 2025 3:33 PM EST    Location:  [id number] PCP: Ellis Ordonez MD       Hospital Day: 2         Date of Admission: 2025    Chief Complaint: nausea and vomiting    Hospital Course: Edward Barlow is a 74 y.o. male with past medical history of recently diagnosed pulmonary embolism on Eliquis, chronic pancreatitis status post NG tube placement, recent community-acquired pneumonia class III malnutrition, celiac disease, hypertension, hyperlipidemia, history of TIA and bladder cancer.  He presented to the emergency department on account of nausea and vomiting of 1 day duration.  He had an NG tube placed 2024 but this was discontinued yesterday -- it was blocked after his diet has been expanded under GI oversight.  Patient denies any other symptoms.      CT of the abdomen pelvis showed some improvement in pancreatic findings but an increase in gastric distention and soft tissue thickening suspicious for SBO in the transverse duodenum, another small bowel dilation and transition point in right abdomen concerning for an additional small bowel obstruction. No significant biliary duct dilatation with biliary stent in place. There is also less conspicuous RLL pulm embolism, with improvement bilateral lower lobe consolidations.     Subjective: Patient seen and examined at bedside this morning.  Wife is present.  Patient does endorse improvement of nausea and vomiting with NG tube.  Currently on exam patient denies any abdominal pain.  Patient and spouse deny any new concerns at this time.    Patient is awaiting GI and general surgery consults.    Assessment and Recommendations:    Small bowel obstruction: Noted on imaging and transverse duodenum with an additional transition point + dilatation in the right abdomen.  Keep n.p.o., NGT placed in ED under gentle

## 2025-01-05 NOTE — PROGRESS NOTES
4 Eyes Skin Assessment     NAME:  Edward Barlow  YOB: 1950  MEDICAL RECORD NUMBER:  [id number]    The patient is being assessed for  Admission    I agree that at least one RN has performed a thorough Head to Toe Skin Assessment on the patient. ALL assessment sites listed below have been assessed.      Areas assessed by both nurses:    Head, Face, Ears, Shoulders, Back, Chest, Arms, Elbows, Hands, Sacrum. Buttock, Coccyx, Ischium, Legs. Feet and Heels, and Under Medical Devices         Does the Patient have a Wound? Yes wound(s) were present on assessment. LDA wound assessment was Initiated and completed by RN       Robin Prevention initiated by RN: Yes  Wound Care Orders initiated by RN: Yes    Pressure Injury (Stage 3,4, Unstageable, DTI, NWPT, and Complex wounds) if present, place Wound referral order by RN under : No    New Ostomies, if present place, Ostomy referral order under : No     Nurse 1 eSignature: Electronically signed by Louann Epps RN on 1/4/25 at 9:35 PM EST    **SHARE this note so that the co-signing nurse can place an eSignature**    Nurse 2 eSignature: Electronically signed by Orly Lindsey RN on 1/5/25 at 12:55 AM EST

## 2025-01-06 ENCOUNTER — APPOINTMENT (OUTPATIENT)
Age: 75
End: 2025-01-06
Payer: MEDICARE

## 2025-01-06 LAB
ALBUMIN SERPL-MCNC: 2.6 G/DL (ref 3.4–5)
ALBUMIN/GLOB SERPL: 0.8 {RATIO}
ALP SERPL-CCNC: 59 U/L (ref 40–129)
ALT SERPL-CCNC: 21 U/L (ref 10–40)
ANION GAP SERPL CALCULATED.3IONS-SCNC: 10 MMOL/L (ref 3–16)
AST SERPL-CCNC: 17 U/L (ref 15–37)
BASOPHILS # BLD: 0.01 K/UL (ref 0–0.2)
BASOPHILS NFR BLD: 0 %
BILIRUB SERPL-MCNC: 0.4 MG/DL (ref 0–1)
BUN SERPL-MCNC: 14 MG/DL (ref 7–20)
CALCIUM SERPL-MCNC: 9.5 MG/DL (ref 8.3–10.6)
CHLORIDE SERPL-SCNC: 109 MMOL/L (ref 99–110)
CO2 SERPL-SCNC: 21 MMOL/L (ref 21–32)
CREAT SERPL-MCNC: 0.9 MG/DL (ref 0.8–1.3)
CRP SERPL HS-MCNC: >70 MG/L (ref 0–5.1)
EOSINOPHIL # BLD: 0.07 K/UL (ref 0–0.6)
EOSINOPHILS RELATIVE PERCENT: 1 %
ERYTHROCYTE [DISTWIDTH] IN BLOOD BY AUTOMATED COUNT: 15.5 % (ref 12.4–15.4)
ERYTHROCYTE [SEDIMENTATION RATE] IN BLOOD BY WESTERGREN METHOD: 28 MM/HR (ref 0–20)
EST. AVERAGE GLUCOSE BLD GHB EST-MCNC: 117 MG/DL
GFR, ESTIMATED: >90 ML/MIN/1.73M2
GLUCOSE BLD-MCNC: 77 MG/DL (ref 70–99)
GLUCOSE BLD-MCNC: 79 MG/DL (ref 70–99)
GLUCOSE BLD-MCNC: 92 MG/DL (ref 70–99)
GLUCOSE BLD-MCNC: 94 MG/DL (ref 70–99)
GLUCOSE SERPL-MCNC: 101 MG/DL (ref 70–99)
HBA1C MFR BLD: 5.7 %
HCT VFR BLD AUTO: 33.5 % (ref 40.5–52.5)
HCT VFR BLD AUTO: 34.1 % (ref 40.5–52.5)
HGB BLD-MCNC: 10.7 G/DL (ref 13.5–17.5)
HGB BLD-MCNC: 10.9 G/DL (ref 13.5–17.5)
IMM GRANULOCYTES # BLD AUTO: 0.03 K/UL (ref 0–0.5)
IMM GRANULOCYTES NFR BLD: 0 %
LYMPHOCYTES NFR BLD: 2.23 K/UL (ref 1–5.1)
LYMPHOCYTES RELATIVE PERCENT: 25 %
MAGNESIUM SERPL-MCNC: 2.1 MG/DL (ref 1.8–2.4)
MCH RBC QN AUTO: 28.7 PG (ref 26–34)
MCHC RBC AUTO-ENTMCNC: 31.9 G/DL (ref 31–36)
MCV RBC AUTO: 89.8 FL (ref 80–100)
MONOCYTES NFR BLD: 0.4 K/UL (ref 0–1.3)
MONOCYTES NFR BLD: 5 %
NEUTROPHILS NFR BLD: 69 %
NEUTS SEG NFR BLD: 6.06 K/UL (ref 1.7–7.7)
PARTIAL THROMBOPLASTIN TIME: 59.1 SEC (ref 22.1–36.4)
PARTIAL THROMBOPLASTIN TIME: 83.4 SEC (ref 22.1–36.4)
PARTIAL THROMBOPLASTIN TIME: >180 SEC (ref 22.1–36.4)
PATH REV BLD -IMP: NORMAL
PLATELET # BLD AUTO: 204 K/UL (ref 135–450)
PMV BLD AUTO: 10.2 FL
POTASSIUM SERPL-SCNC: 3.5 MMOL/L (ref 3.5–5.1)
PROCALCITONIN SERPL-MCNC: 0.53 NG/ML (ref 0–0.15)
PROT SERPL-MCNC: 5.9 G/DL (ref 6.4–8.2)
RBC # BLD AUTO: 3.73 M/UL (ref 4.2–5.9)
SODIUM SERPL-SCNC: 140 MMOL/L (ref 136–145)
WBC OTHER # BLD: 8.8 K/UL (ref 4–11)

## 2025-01-06 PROCEDURE — 83735 ASSAY OF MAGNESIUM: CPT

## 2025-01-06 PROCEDURE — 85025 COMPLETE CBC W/AUTO DIFF WBC: CPT

## 2025-01-06 PROCEDURE — 6360000004 HC RX CONTRAST MEDICATION: Performed by: INTERNAL MEDICINE

## 2025-01-06 PROCEDURE — 99232 SBSQ HOSP IP/OBS MODERATE 35: CPT | Performed by: SURGERY

## 2025-01-06 PROCEDURE — 82962 GLUCOSE BLOOD TEST: CPT

## 2025-01-06 PROCEDURE — 2580000003 HC RX 258: Performed by: STUDENT IN AN ORGANIZED HEALTH CARE EDUCATION/TRAINING PROGRAM

## 2025-01-06 PROCEDURE — 74177 CT ABD & PELVIS W/CONTRAST: CPT

## 2025-01-06 PROCEDURE — 85730 THROMBOPLASTIN TIME PARTIAL: CPT

## 2025-01-06 PROCEDURE — 85652 RBC SED RATE AUTOMATED: CPT

## 2025-01-06 PROCEDURE — 86140 C-REACTIVE PROTEIN: CPT

## 2025-01-06 PROCEDURE — 74018 RADEX ABDOMEN 1 VIEW: CPT

## 2025-01-06 PROCEDURE — 2580000003 HC RX 258: Performed by: INTERNAL MEDICINE

## 2025-01-06 PROCEDURE — 80053 COMPREHEN METABOLIC PANEL: CPT

## 2025-01-06 PROCEDURE — 2060000000 HC ICU INTERMEDIATE R&B

## 2025-01-06 PROCEDURE — 6360000002 HC RX W HCPCS: Performed by: INTERNAL MEDICINE

## 2025-01-06 PROCEDURE — 84145 PROCALCITONIN (PCT): CPT

## 2025-01-06 PROCEDURE — 36415 COLL VENOUS BLD VENIPUNCTURE: CPT

## 2025-01-06 PROCEDURE — 2500000003 HC RX 250 WO HCPCS: Performed by: INTERNAL MEDICINE

## 2025-01-06 PROCEDURE — 2500000003 HC RX 250 WO HCPCS: Performed by: STUDENT IN AN ORGANIZED HEALTH CARE EDUCATION/TRAINING PROGRAM

## 2025-01-06 PROCEDURE — 02HV33Z INSERTION OF INFUSION DEVICE INTO SUPERIOR VENA CAVA, PERCUTANEOUS APPROACH: ICD-10-PCS | Performed by: HOSPITALIST

## 2025-01-06 PROCEDURE — 85018 HEMOGLOBIN: CPT

## 2025-01-06 PROCEDURE — 85014 HEMATOCRIT: CPT

## 2025-01-06 PROCEDURE — 6360000002 HC RX W HCPCS: Performed by: STUDENT IN AN ORGANIZED HEALTH CARE EDUCATION/TRAINING PROGRAM

## 2025-01-06 RX ORDER — SODIUM CHLORIDE 9 MG/ML
INJECTION, SOLUTION INTRAVENOUS PRN
Status: DISCONTINUED | OUTPATIENT
Start: 2025-01-06 | End: 2025-01-17 | Stop reason: HOSPADM

## 2025-01-06 RX ORDER — IOPAMIDOL 755 MG/ML
75 INJECTION, SOLUTION INTRAVASCULAR
Status: COMPLETED | OUTPATIENT
Start: 2025-01-06 | End: 2025-01-06

## 2025-01-06 RX ORDER — SODIUM CHLORIDE 0.9 % (FLUSH) 0.9 %
5-40 SYRINGE (ML) INJECTION PRN
Status: DISCONTINUED | OUTPATIENT
Start: 2025-01-06 | End: 2025-01-17 | Stop reason: HOSPADM

## 2025-01-06 RX ORDER — SODIUM CHLORIDE 0.9 % (FLUSH) 0.9 %
5-40 SYRINGE (ML) INJECTION EVERY 12 HOURS SCHEDULED
Status: DISCONTINUED | OUTPATIENT
Start: 2025-01-06 | End: 2025-01-17 | Stop reason: HOSPADM

## 2025-01-06 RX ADMIN — SODIUM CHLORIDE: 9 INJECTION, SOLUTION INTRAVENOUS at 20:42

## 2025-01-06 RX ADMIN — IOPAMIDOL 75 ML: 755 INJECTION, SOLUTION INTRAVENOUS at 09:16

## 2025-01-06 RX ADMIN — Medication 40 MG: at 09:03

## 2025-01-06 RX ADMIN — SODIUM CHLORIDE: 9 INJECTION, SOLUTION INTRAVENOUS at 05:41

## 2025-01-06 RX ADMIN — HEPARIN SODIUM 19 UNITS/KG/HR: 10000 INJECTION, SOLUTION INTRAVENOUS at 15:26

## 2025-01-06 RX ADMIN — MEROPENEM 1000 MG: 1 INJECTION INTRAVENOUS at 20:45

## 2025-01-06 RX ADMIN — SODIUM CHLORIDE, PRESERVATIVE FREE 10 ML: 5 INJECTION INTRAVENOUS at 20:55

## 2025-01-06 RX ADMIN — MEROPENEM 1000 MG: 1 INJECTION INTRAVENOUS at 11:27

## 2025-01-06 RX ADMIN — PIPERACILLIN AND TAZOBACTAM 3375 MG: 3; .375 INJECTION, POWDER, LYOPHILIZED, FOR SOLUTION INTRAVENOUS at 10:47

## 2025-01-06 RX ADMIN — HEPARIN SODIUM 2000 UNITS: 1000 INJECTION INTRAVENOUS; SUBCUTANEOUS at 11:15

## 2025-01-06 RX ADMIN — MICAFUNGIN SODIUM 100 MG: 100 INJECTION, POWDER, LYOPHILIZED, FOR SOLUTION INTRAVENOUS at 12:11

## 2025-01-06 RX ADMIN — SODIUM CHLORIDE, PRESERVATIVE FREE 10 ML: 5 INJECTION INTRAVENOUS at 09:03

## 2025-01-06 RX ADMIN — SODIUM CHLORIDE, PRESERVATIVE FREE 10 ML: 5 INJECTION INTRAVENOUS at 20:46

## 2025-01-06 RX ADMIN — PIPERACILLIN AND TAZOBACTAM 3375 MG: 3; .375 INJECTION, POWDER, LYOPHILIZED, FOR SOLUTION INTRAVENOUS at 02:22

## 2025-01-06 ASSESSMENT — PAIN SCALES - WONG BAKER
WONGBAKER_NUMERICALRESPONSE: NO HURT

## 2025-01-06 ASSESSMENT — PAIN SCALES - GENERAL
PAINLEVEL_OUTOF10: 0

## 2025-01-06 NOTE — CONSULTS
Comprehensive Nutrition Assessment    Type and Reason for Visit:  Positive nutrition screen, Consult (TPN recommendations)    Nutrition Recommendations/Plan:   Continue NPO.  If TPN started:  Recommend check TG, Mg, Phos, CMP now if not done in last 24 hours.  Bag 1: Clinimix 5/20 starting at 40 mL/hr  Physician/LIP to monitor closely and correct lytes (Phos,Mg,K+) d/t risk of refeeding syndrome  Bag 2: As long as electrolytes WNL, advance Clinimix 5/20 to goal rate of 83 mL/hr  Recommend holding lipids at this time d/t pancreatitis.  Recommend FSBS, monitor glucose, need for insulin  Pharmacy to adjust MVI and Trace Elements as needed        Malnutrition Assessment:  Malnutrition Status:  Severe malnutrition (01/06/25 1008)    Context:  Chronic Illness     Findings of the 6 clinical characteristics of malnutrition:  Energy Intake:  75% or less estimated energy requirements for 1 month or longer  Weight Loss:  5% over 1 month     Body Fat Loss:  Unable to assess     Muscle Mass Loss:  Unable to assess    Fluid Accumulation:  No fluid accumulation     Strength:  Not Performed    Nutrition Assessment:    +nutrition screen/ Consult \"TPN recommendations\". PT with PMH of pancreatitis with necrosis, celian disease, HTN, and HLD who presented with N/V. Pt known to RD from previous admissions. Pt had NJ placed on 12/9 and was removed on 1/3 d/t blockage when diet was advanced. Pt had issues with TF volumes during previous admissions. NG was placed and now to suction. Noted SBO, KUB shows improvement. GI considering PICC placement and starting TPN. Noted pt with new wound to buttock. Will provide TPN recommendations.    Nutrition Related Findings:    BM 1/1; No edema noted; Labs reviewed Wound Type: Pressure Injury (buttock)       Current Nutrition Intake & Therapies:    Average Meal Intake: NPO  Average Supplements Intake: NPO  Diet NPO  Current Parenteral Nutrition Recommendations:  Goal PN Orders Provides: Clinimix

## 2025-01-06 NOTE — PROGRESS NOTES
Vascular access called for PICC line placement.     Mignon from [employer] [facility] stated there will be a delay due to winter weather advisory. [facility] will update with an ETA.

## 2025-01-06 NOTE — PROGRESS NOTES
Hospitalist Progress Note      Name:  Edward Barlow /Age/Sex: 1950  (74 y.o. male)   MRN & CSN:  [id number] & [id number] Encounter Date/Time: 2025 6:33 AM EST    Location:  [id number] PCP: Ellis Ordonez MD       Hospital Day: 3         Date of Admission: 2025    Chief Complaint: nausea and vomiting    Hospital Course:  Edward Barlow is a 74 y.o. male with past medical history of recently diagnosed pulmonary embolism on Eliquis, chronic pancreatitis status post NG tube placement, recent community-acquired pneumonia class III malnutrition, celiac disease, hypertension, hyperlipidemia, history of TIA and bladder cancer.  He presented to the emergency department on account of nausea and vomiting of 1 day duration.  He had an NG tube placed 2024 but this was discontinued yesterday -- it was blocked after his diet has been expanded under GI oversight.  Patient denies any other symptoms.       CT of the abdomen pelvis showed some improvement in pancreatic findings but an increase in gastric distention and soft tissue thickening suspicious for SBO in the transverse duodenum, another small bowel dilation and transition point in right abdomen concerning for an additional small bowel obstruction. No significant biliary duct dilatation with biliary stent in place. There is also less conspicuous RLL pulm embolism, with improvement bilateral lower lobe consolidations.      CT abdomen pelvis-  Impression:          1. Gastric antral thickening likely representing antritis.  2. Associated poor definition of pancreas in the region of the head and proximal  body that may represent secondary pancreatitis.  3. Biliary stent noted in satisfactory location.  4. Diffuse small intestinal distention of moderate severity favoring ileus. No  transition zone visualized.     Subjective: Patient seen and examined at bedside this morning.  Wife is present at the room.  Patient just returned from CT.  Patient denies

## 2025-01-06 NOTE — PLAN OF CARE
Problem: Discharge Planning  Goal: Discharge to home or other facility with appropriate resources  1/5/2025 2137 by Louann Epps RN  Outcome: Progressing  1/5/2025 1010 by Richelle Yanes RN  Outcome: Progressing  Flowsheets (Taken 1/5/2025 1010)  Discharge to home or other facility with appropriate resources:   Identify barriers to discharge with patient and caregiver   Identify discharge learning needs (meds, wound care, etc)   Arrange for needed discharge resources and transportation as appropriate     Problem: Skin/Tissue Integrity  Goal: Absence of new skin breakdown  Description: 1.  Monitor for areas of redness and/or skin breakdown  2.  Assess vascular access sites hourly  3.  Every 4-6 hours minimum:  Change oxygen saturation probe site  4.  Every 4-6 hours:  If on nasal continuous positive airway pressure, respiratory therapy assess nares and determine need for appliance change or resting period.  1/5/2025 2137 by Louann Epps RN  Outcome: Progressing  1/5/2025 1010 by Richelle Yanes RN  Outcome: Progressing     Problem: ABCDS Injury Assessment  Goal: Absence of physical injury  1/5/2025 2137 by Louann Epps RN  Outcome: Progressing  1/5/2025 1010 by Richelle Yanes RN  Outcome: Progressing  Flowsheets (Taken 1/5/2025 1010)  Absence of Physical Injury: Implement safety measures based on patient assessment     Problem: Safety - Adult  Goal: Free from fall injury  1/5/2025 2137 by Louann Epps RN  Outcome: Progressing  1/5/2025 1010 by Richelle Yanes RN  Outcome: Progressing  Flowsheets (Taken 1/5/2025 1010)  Free From Fall Injury: Instruct family/caregiver on patient safety     Problem: Pain  Goal: Verbalizes/displays adequate comfort level or baseline comfort level  1/5/2025 2137 by Louann Epps RN  Outcome: Progressing  1/5/2025 1010 by Richelle Yanes RN  Outcome: Progressing  Flowsheets (Taken 1/5/2025 1010)  Verbalizes/displays adequate comfort level or baseline comfort level:

## 2025-01-06 NOTE — PROGRESS NOTES
Gastroenterology Progress Note      Edward Barlow is a 74 y.o. male patient.  Principal Problem:    SBO (small bowel obstruction) (HCC)  Active Problems:    Chronic pancreatitis (HCC)    Abdominal distention    SHAJI (acute kidney injury) (HCC)    Lactic acidosis  Resolved Problems:    * No resolved hospital problems. *      SUBJECTIVE: Patient was seen in our office on Friday where I did remove his nasal jejunal feeding tube    He tried to eat some breakfast on Saturday that he had projectile vomiting    Came to the emergency room where he was found to have again a distended stomach he needed an NG tube it appears that he is put out 1.8 L of fluid    He also had some air bubbles around the pancreas    His white count is elevated into the 30    He was started on IV antibiotic    ROS:  No fever, chills  No chest pain, palpitations  No SOB, cough  Gastrointestinal ROS: no abdominal pain, nausea, vomiting     Physical    VITALS:  /87   Pulse 77   Temp 97.7 °F (36.5 °C) (Oral)   Resp 16   Ht 1.778 m (5' 10\")   Wt 64.9 kg (143 lb 1.3 oz)   SpO2 98%   BMI 20.53 kg/m²   TEMPERATURE:  Current - Temp: 97.7 °F (36.5 °C); Max - Temp  Av.7 °F (36.5 °C)  Min: 97.3 °F (36.3 °C)  Max: 98.1 °F (36.7 °C)    NAD  RRR, Nl s1s2  Lungs CTA Bilaterally, normal effort  Abdomen soft, ND, NT, no HSM, Bowel sounds normal.    Data    Data Review:    Recent Labs     250   WBC 18.6* 33.8*   HGB 14.4 11.4*   HCT 45.0 35.3*   MCV 88.4 87.8    299     Recent Labs     25  041   * 136   K 4.8 4.1   CL 98* 105   CO2 20* 20*   BUN 19 17   CREATININE 1.4* 1.0     Recent Labs     25  041   AST 31 18   ALT 40 25   BILIDIR 0.3  --    BILITOT 0.6 0.5   ALKPHOS 96 68     Recent Labs     25   LIPASE 14     Recent Labs     25   PROTIME 18.6*   INR 1.5*     Invalid input(s): \"PTT\"    Radiology Review: I have reviewed his  CT      ASSESSMENT   Pancreatitis with possible necrosis  -Patient is currently on IV antibiotic  Nutrition  -Nutrition seems to be our biggest issue the patient has developed a bedsore on his sacrum  -At this time I think a PICC line would be highly appropriate because of all the IVs that he is get  -I would start on TPN  -I would repeat a CT scan because of the gastric outlet obstruction and the other small bowel obstruction to see if there relieved  -By physical exam today his abdomen is soft nontender nondistended no mass  -I would then consider placing a G-tube on Wednesday  -Allowing this to mature over 4 weeks and then replacing it with a G to J-tube  -We will continue to watch this patient    Tom Dias MD

## 2025-01-06 NOTE — PROGRESS NOTES
Arrived to place PICC line with bedside RN [place]. Pre-procedure and timeout done with RN, discussed limitations of placement and allergies. Consent confirmed. Vital signs stable. Labs, allergies, medications, and code status reviewed. No contraindications noted.    Procedure explained to pt, including the risk and benefits of the procedure. All questions answered. Pt verbalizes understanding of the procedure and states no more questions.     Pt's basilic, brachial, cephalic are all easily collapsible with no indication for a clot. Vein selected is large enough for catheter. Pt tolerated sterile procedure well, with no difficulty accessing brachial vein, when accessed - blood was free flowing and non-pulsatile. Guidewire, introducer, and catheter went in smoothly.     PICC line verified with ECG:  Please replace all existing IV tubing with new IV tubing prior to using the PICC for current IV infusions.  Please remove any PIVs from PICC arm.  All of the above may be sources of infection or an increase chance of a clot.      Post procedure - reorganized pt table, placed pt in lowest position, with call light and educated on line care. Instructed pt/RN not to use arm for at least 30min to avoid bleeding. Reported off to bedside RN.        [phone]

## 2025-01-06 NOTE — PROGRESS NOTES
General Surgery Progress Note                 PATIENT NAME: Edward Barlow   Medical Record Number: [id number]  YOB: 1950       TODAY'S DATE: 1/6/2025      Chief Complaint   Patient presents with    Nausea    Vomiting         SUBJECTIVE:    Pt denies pain.  Denies flatus or BM.  Comfortable with NG in place       OBJECTIVE:   VITALS:  /87   Pulse 77   Temp 97.7 °F (36.5 °C) (Oral)   Resp 16   Ht 1.778 m (5' 10\")   Wt 64.9 kg (143 lb 1.3 oz)   SpO2 98%   BMI 20.53 kg/m²       INTAKE/OUTPUT:    I/O last 3 completed shifts:  In: 1000 [IV Piggyback:1000]  Out: 3400 [Urine:200; Emesis/NG output:3200]  No intake/output data recorded.              GENERAL: alert, no distress    LUNGS: Normal rate and effort  HEART: normal rate and regular rhythm  ABDOMEN: soft, non-tender and non-distended  EXTREMITY: no cyanosis, clubbing or edema      DATA:  CBC:   Recent Labs     01/04/25  1740 01/05/25  0410   WBC 18.6* 33.8*   HGB 14.4 11.4*   HCT 45.0 35.3*    299     BMP:    Recent Labs     01/04/25  1740 01/05/25  0410   * 136   K 4.8 4.1   CL 98* 105   CO2 20* 20*   BUN 19 17   CREATININE 1.4* 1.0   GLUCOSE 216* 162*     Hepatic:   Recent Labs     01/04/25  1740 01/05/25  0410   AST 31 18   ALT 40 25   BILITOT 0.6 0.5   ALKPHOS 96 68     Mag:      Recent Labs     01/04/25  1740   MG 2.2      Phos:   No results for input(s): \"PHOS\" in the last 72 hours.   INR:   Recent Labs     01/04/25  2121   INR 1.5*         Radiology Review: KUB with improving bowel dilatation      Assessment  Edward Barlow is a 74 y.o. male admitted for chronic pancreatitis, SBO.  Abdomen remains soft without tenderness      Plan    1.  SBO  KUB noted.  Await return of GI function  Continue NG tube  Await a.m. labs  Consider SBFT if no significant improvement in the next 2 to 3 days    2.  Chronic pancreatitis with GOO  Continue NG tube  GI planning PEG in near future with transition to GJ tube          Dimitry Garcia,

## 2025-01-06 NOTE — CONSULTS
negative for recurrent infections, angioedema, anaphylaxis   Endocrine:  negative for weight changes, polyuria, polydipsia and polyphagia  Musculoskeletal:  negative for joint  pain, swelling, decreased range of motion  Integumentary: No rashes, skin lesions  Neurological:  negative for headaches, slurred speech, unilateral weakness  Psychiatric: negative for hallucinations,confusion,agitation.     PHYSICAL EXAM:      Vitals:    /81   Pulse 68   Temp 98.1 °F (36.7 °C) (Oral)   Resp 16   Ht 1.778 m (5' 10\")   Wt 64.9 kg (143 lb 1.3 oz)   SpO2 99%   BMI 20.53 kg/m²     General Appearance: alert,in no acute distress, ++  pallor, no icterus chronically ill-appearing gentleman NG tube in place   Skin: warm and dry, no rash or erythema  Head: normocephalic and atraumatic  Eyes: pupils equal, round, and reactive to light, conjunctivae normal  ENT: tympanic membrane, external ear and ear canal normal bilaterally, nose without deformity, nasal mucosa and turbinates normal without polyps  Neck: supple and non-tender without mass, no thyromegaly  no cervical lymphadenopathy  Pulmonary/Chest: Bibasal crepitations- no wheezes, rales or rhonchi, normal air movement, no respiratory distress  Cardiovascular: normal rate, regular rhythm, normal S1 and S2, no murmurs, rubs, clicks, or gallops, no carotid bruits  Abdomen: soft, ++ tender, ++-distended, normal bowel sounds, no masses or organomegaly  Extremities: no cyanosis, clubbing or edema  Musculoskeletal: normal range of motion, no joint swelling, deformity or tenderness  Integumentary: No rashes, no abnormal skin lesions, no petechiae  Neurologic: reflexes normal and symmetric, no cranial nerve deficit  Psych:  Orientation, sensorium, mood normal   Lines: IV      DATA:    CBC:   Lab Results   Component Value Date    WBC 8.8 01/06/2025    HGB 10.4 (L) 01/07/2025    HCT 32.5 (L) 01/07/2025    MCV 89.8 01/06/2025     01/06/2025     RENAL:   Lab Results  sodium chloride flush  5-40 mL IntraVENous 2 times per day    pantoprazole (PROTONIX) 40 mg in sodium chloride (PF) 0.9 % 10 mL injection  40 mg IntraVENous Daily    insulin lispro  0-4 Units SubCUTAneous 4x Daily AC & HS       Continuous Infusions:   dextrose 5% in lactated ringers 75 mL/hr at 01/07/25 0040    sodium chloride      heparin (PORCINE) Infusion 23 Units/kg/hr (01/07/25 0757)    sodium chloride 5 mL/hr at 01/05/25 1053    dextrose         PRN Meds:  sodium chloride flush, sodium chloride, phenol, heparin (porcine), heparin (porcine), sodium chloride flush, sodium chloride, potassium chloride **OR** potassium alternative oral replacement **OR** potassium chloride, magnesium sulfate, ondansetron **OR** ondansetron, acetaminophen **OR** acetaminophen, bisacodyl, HYDROmorphone **OR** HYDROmorphone, droPERidol, glucose, dextrose bolus **OR** dextrose bolus, glucagon (rDNA), dextrose    Imaging:   CT ABDOMEN PELVIS W IV CONTRAST Additional Contrast? None   Final Result      1. Gastric antral thickening likely representing antritis.   2. Associated poor definition of pancreas in the region of the head and proximal   body that may represent secondary pancreatitis.   3. Biliary stent noted in satisfactory location.   4. Diffuse small intestinal distention of moderate severity favoring ileus. No   transition zone visualized.      Electronically signed by Farshad Mendiola      XR ABDOMEN (KUB) (SINGLE AP VIEW)   Final Result      Improvement in the number of centrally dilated loops of small bowel. Biliary stent is again noted.      Electronically signed by Marcello Prasad MD      XR CHEST PORTABLE   Final Result      1.  Nasogastric tube tip projects at the gastric body.      Electronically signed by Paul Rg      CT ABDOMEN PELVIS W IV CONTRAST Additional Contrast? None   Final Result      1.  Evolving pancreatitis with decreased peripancreatic fluid collections and also overall slightly decreased peripancreatic

## 2025-01-07 PROBLEM — K85.90 ACUTE ON CHRONIC PANCREATITIS (HCC): Status: ACTIVE | Noted: 2025-01-07

## 2025-01-07 PROBLEM — K86.1 ACUTE ON CHRONIC PANCREATITIS (HCC): Status: ACTIVE | Noted: 2025-01-07

## 2025-01-07 PROBLEM — D72.828 NEUTROPHILIA: Status: ACTIVE | Noted: 2025-01-07

## 2025-01-07 PROBLEM — K56.609 INTESTINAL OBSTRUCTION (HCC): Status: ACTIVE | Noted: 2025-01-07

## 2025-01-07 PROBLEM — R11.2 NAUSEA AND VOMITING: Status: ACTIVE | Noted: 2025-01-07

## 2025-01-07 PROBLEM — Z98.890 HISTORY OF BILIARY STENT INSERTION: Status: ACTIVE | Noted: 2025-01-07

## 2025-01-07 PROBLEM — E86.0 DEHYDRATION: Status: ACTIVE | Noted: 2025-01-07

## 2025-01-07 LAB
GLUCOSE BLD-MCNC: 100 MG/DL (ref 70–99)
GLUCOSE BLD-MCNC: 103 MG/DL (ref 70–99)
GLUCOSE BLD-MCNC: 107 MG/DL (ref 70–99)
GLUCOSE BLD-MCNC: 107 MG/DL (ref 70–99)
GLUCOSE BLD-MCNC: 75 MG/DL (ref 70–99)
GLUCOSE BLD-MCNC: 90 MG/DL (ref 70–99)
HCT VFR BLD AUTO: 30.6 % (ref 40.5–52.5)
HCT VFR BLD AUTO: 32.5 % (ref 40.5–52.5)
HGB BLD-MCNC: 10.4 G/DL (ref 13.5–17.5)
HGB BLD-MCNC: 9.9 G/DL (ref 13.5–17.5)
PARTIAL THROMBOPLASTIN TIME: 51.3 SEC (ref 22.1–36.4)
PARTIAL THROMBOPLASTIN TIME: 67.3 SEC (ref 22.1–36.4)
PARTIAL THROMBOPLASTIN TIME: 71.3 SEC (ref 22.1–36.4)

## 2025-01-07 PROCEDURE — 2580000003 HC RX 258: Performed by: INTERNAL MEDICINE

## 2025-01-07 PROCEDURE — 2580000003 HC RX 258: Performed by: STUDENT IN AN ORGANIZED HEALTH CARE EDUCATION/TRAINING PROGRAM

## 2025-01-07 PROCEDURE — 85014 HEMATOCRIT: CPT

## 2025-01-07 PROCEDURE — 2500000003 HC RX 250 WO HCPCS: Performed by: STUDENT IN AN ORGANIZED HEALTH CARE EDUCATION/TRAINING PROGRAM

## 2025-01-07 PROCEDURE — 99232 SBSQ HOSP IP/OBS MODERATE 35: CPT | Performed by: SURGERY

## 2025-01-07 PROCEDURE — 6360000002 HC RX W HCPCS: Performed by: STUDENT IN AN ORGANIZED HEALTH CARE EDUCATION/TRAINING PROGRAM

## 2025-01-07 PROCEDURE — 85018 HEMOGLOBIN: CPT

## 2025-01-07 PROCEDURE — 82962 GLUCOSE BLOOD TEST: CPT

## 2025-01-07 PROCEDURE — 99223 1ST HOSP IP/OBS HIGH 75: CPT | Performed by: INTERNAL MEDICINE

## 2025-01-07 PROCEDURE — 6360000002 HC RX W HCPCS: Performed by: INTERNAL MEDICINE

## 2025-01-07 PROCEDURE — 2500000003 HC RX 250 WO HCPCS: Performed by: INTERNAL MEDICINE

## 2025-01-07 PROCEDURE — 2060000000 HC ICU INTERMEDIATE R&B

## 2025-01-07 PROCEDURE — 85730 THROMBOPLASTIN TIME PARTIAL: CPT

## 2025-01-07 PROCEDURE — 36415 COLL VENOUS BLD VENIPUNCTURE: CPT

## 2025-01-07 PROCEDURE — 84134 ASSAY OF PREALBUMIN: CPT

## 2025-01-07 RX ORDER — DEXTROSE, SODIUM CHLORIDE, SODIUM LACTATE, POTASSIUM CHLORIDE, AND CALCIUM CHLORIDE 5; .6; .31; .03; .02 G/100ML; G/100ML; G/100ML; G/100ML; G/100ML
INJECTION, SOLUTION INTRAVENOUS CONTINUOUS
Status: DISPENSED | OUTPATIENT
Start: 2025-01-07 | End: 2025-01-08

## 2025-01-07 RX ADMIN — SODIUM CHLORIDE, SODIUM LACTATE, POTASSIUM CHLORIDE, CALCIUM CHLORIDE AND DEXTROSE MONOHYDRATE: 5; 600; 310; 30; 20 INJECTION, SOLUTION INTRAVENOUS at 13:19

## 2025-01-07 RX ADMIN — MEROPENEM 1000 MG: 1 INJECTION INTRAVENOUS at 20:37

## 2025-01-07 RX ADMIN — MEROPENEM 1000 MG: 1 INJECTION INTRAVENOUS at 05:06

## 2025-01-07 RX ADMIN — SODIUM CHLORIDE, PRESERVATIVE FREE 10 ML: 5 INJECTION INTRAVENOUS at 09:52

## 2025-01-07 RX ADMIN — SODIUM CHLORIDE, SODIUM LACTATE, POTASSIUM CHLORIDE, CALCIUM CHLORIDE AND DEXTROSE MONOHYDRATE: 5; 600; 310; 30; 20 INJECTION, SOLUTION INTRAVENOUS at 00:40

## 2025-01-07 RX ADMIN — HEPARIN SODIUM 4000 UNITS: 1000 INJECTION INTRAVENOUS; SUBCUTANEOUS at 18:58

## 2025-01-07 RX ADMIN — HEPARIN SODIUM 23 UNITS/KG/HR: 10000 INJECTION, SOLUTION INTRAVENOUS at 07:57

## 2025-01-07 RX ADMIN — MEROPENEM 1000 MG: 1 INJECTION INTRAVENOUS at 11:26

## 2025-01-07 RX ADMIN — MICAFUNGIN SODIUM 100 MG: 100 INJECTION, POWDER, LYOPHILIZED, FOR SOLUTION INTRAVENOUS at 10:01

## 2025-01-07 RX ADMIN — Medication 40 MG: at 09:51

## 2025-01-07 RX ADMIN — HEPARIN SODIUM 2000 UNITS: 1000 INJECTION INTRAVENOUS; SUBCUTANEOUS at 07:53

## 2025-01-07 NOTE — PROGRESS NOTES
Hospitalist Progress Note      Name:  Edward Barlow /Age/Sex: 1950  (74 y.o. male)   MRN & CSN:  [id number] & [id number] Encounter Date/Time: 2025 2:33 PM EST    Location:  [id number] PCP: Ellis Ordonez MD       Hospital Day: 4         Date of Admission: 2025    Chief Complaint: Nausea vomiting    Hospital Course:  Edward Barlow is a 74 y.o. male with past medical history of recently diagnosed pulmonary embolism on Eliquis, chronic pancreatitis status post NG tube placement, recent community-acquired pneumonia class III malnutrition, celiac disease, hypertension, hyperlipidemia, history of TIA and bladder cancer.  He presented to the emergency department on account of nausea and vomiting of 1 day duration.  He had an NG tube placed 2024 but this was discontinued yesterday -- it was blocked after his diet has been expanded under GI oversight.  Patient denies any other symptoms.       CT of the abdomen pelvis showed some improvement in pancreatic findings but an increase in gastric distention and soft tissue thickening suspicious for SBO in the transverse duodenum, another small bowel dilation and transition point in right abdomen concerning for an additional small bowel obstruction. No significant biliary duct dilatation with biliary stent in place. There is also less conspicuous RLL pulm embolism, with improvement bilateral lower lobe consolidations.       CT abdomen pelvis-       Impression:           1. Gastric antral thickening likely representing antritis.  2. Associated poor definition of pancreas in the region of the head and proximal  body that may represent secondary pancreatitis.  3. Biliary stent noted in satisfactory location.  4. Diffuse small intestinal distention of moderate severity favoring ileus. No  transition zone visualized.        Subjective: Patient seen examined at bedside this morning.  No acute distress noted.  NG to wall suction.  Patient denies any nausea, no      Recent Labs     01/04/25 2121   INR 1.5*     No results for input(s): \"CKTOTAL\", \"TROPONINI\" in the last 72 hours.    Urinalysis:      Lab Results   Component Value Date/Time    NITRU NEGATIVE 01/04/2025 06:46 PM    WBCUA 21 TO 50 01/04/2025 06:46 PM    BACTERIA 1+ 01/04/2025 06:46 PM    RBCUA 21 TO 50 01/04/2025 06:46 PM    BLOODU Negative 09/25/2024 11:16 AM    GLUCOSEU NEGATIVE 01/04/2025 06:46 PM       Radiology:  CT ABDOMEN PELVIS W IV CONTRAST Additional Contrast? None   Final Result      1. Gastric antral thickening likely representing antritis.   2. Associated poor definition of pancreas in the region of the head and proximal   body that may represent secondary pancreatitis.   3. Biliary stent noted in satisfactory location.   4. Diffuse small intestinal distention of moderate severity favoring ileus. No   transition zone visualized.      Electronically signed by Farshad Mendiola      XR ABDOMEN (KUB) (SINGLE AP VIEW)   Final Result      Improvement in the number of centrally dilated loops of small bowel. Biliary stent is again noted.      Electronically signed by Marcello Prasad MD      XR CHEST PORTABLE   Final Result      1.  Nasogastric tube tip projects at the gastric body.      Electronically signed by Paul Rg      CT ABDOMEN PELVIS W IV CONTRAST Additional Contrast? None   Final Result      1.  Evolving pancreatitis with decreased peripancreatic fluid collections and also overall slightly decreased peripancreatic inflammatory soft tissue attenuation compared to CT from 12/26/2024. However, there is interval increased gastric distention, and    soft tissue thickening results in narrowing of the transverse duodenum concerning for partial obstruction.   2.  Subsequent small bowel dilation and additional transition point in the right abdomen representing additional bowel obstruction.   3.  A few foci of extraluminal gas in the peripancreatic region may relate to patient's medication with bowel or

## 2025-01-07 NOTE — PROGRESS NOTES
Progress Note    Patient Edward Barlow  MRN: [id number]  YOB: 1950 Age: 74 y.o. Sex: male  Room: [address] [place] [address]       Admitting Physician: Faith Dominguez MD   Date of Admission: 1/4/2025  5:16 PM   Primary Care Physician: Ellis Ordonez MD     Subjective:  Edward Barlow was seen and examined. We are following for partial small bowel obstruction, pancreatitis.  -- Patient reports that his abdominal distention was much better, denies any abdominal pain.  He is passing very little flatus, no nausea or vomiting.  NG tube is in place.    ROS:  Constitutional: Denies fever, no change in appetite  Respiratory: Denies cough or shortness of breath  Cardiovascular: Denies chest pain or edema    Objective:  Vital Signs:   Vitals:    01/07/25 1100   BP: 128/81   Pulse: 69   Resp: 16   Temp: 98.2 °F (36.8 °C)   SpO2: 99%         Physical Exam:  Constitutional: Alert and oriented x 4. No acute distress.   HEENT: Sclera anicteric, mucosal membranes moist  Cardiovascular: Regular rate and rhythm.  No murmurs.  Respiratory: Respirations nonlabored, no crepitus  GI: Abdomen nondistended, soft, and nontender.  Normal active bowel sounds.  No masses palpable.   Rectal: Deferred  Musculoskeletal:  No pitting edema of the lower legs.  Neurological: Gross memory appears intact.  No Asterixis    Intake/Output:    Intake/Output Summary (Last 24 hours) at 1/7/2025 1430  Last data filed at 1/7/2025 0640  Gross per 24 hour   Intake 0 ml   Output 925 ml   Net -925 ml        Current Medications:  Current Facility-Administered Medications   Medication Dose Route Frequency Provider Last Rate Last Admin    dextrose 5 % in lactated ringers infusion   IntraVENous Continuous Abundio Vides MD 75 mL/hr at 01/07/25 1319 New Bag at 01/07/25 1319    sodium chloride flush 0.9 % injection 5-40 mL  5-40 mL IntraVENous 2 times per day George Collado MD   10 mL at 01/07/25 0952    sodium chloride flush 0.9 % injection 5-40 mL  5-40 mL

## 2025-01-07 NOTE — PROGRESS NOTES
pt NPO with NG for ileus. Order for cont IV fluids will d/c at 2300 tonight. BG 77 wife at bedside concerned d/t low BG and no cont IV flluids. would you like fluids re-ordered please? Dr Vides

## 2025-01-07 NOTE — PROGRESS NOTES
General Surgery Progress Note                 PATIENT NAME: Edward Barlow   Medical Record Number: [id number]  YOB: 1950       TODAY'S DATE: 1/7/2025      Chief Complaint   Patient presents with    Nausea    Vomiting         SUBJECTIVE:    Pt denies pain.  Reports some flatus but denies BM       OBJECTIVE:   VITALS:  /81   Pulse 69   Temp 98.2 °F (36.8 °C) (Oral)   Resp 16   Ht 1.778 m (5' 10\")   Wt 64.9 kg (143 lb 1.3 oz)   SpO2 99%   BMI 20.53 kg/m²       INTAKE/OUTPUT:    I/O last 3 completed shifts:  In: 0   Out: 1750 [Urine:1050; Emesis/NG output:700]  No intake/output data recorded.              GENERAL: alert, no distress    LUNGS: Normal rate and effort  HEART: normal rate and regular rhythm  ABDOMEN: soft, non-tender and non-distended  EXTREMITY: no cyanosis, clubbing or edema      DATA:  CBC:   Recent Labs     01/04/25 1740 01/05/25 0410 01/06/25  1004 01/06/25  1743 01/07/25  0006 01/07/25  0518   WBC 18.6* 33.8* 8.8  --   --   --    HGB 14.4 11.4* 10.7* 10.9* 10.4* 9.9*   HCT 45.0 35.3* 33.5* 34.1* 32.5* 30.6*    299 204  --   --   --      BMP:    Recent Labs     01/04/25  1740 01/05/25  0410 01/06/25  1004   * 136 140   K 4.8 4.1 3.5   CL 98* 105 109   CO2 20* 20* 21   BUN 19 17 14   CREATININE 1.4* 1.0 0.9   GLUCOSE 216* 162* 101*     Hepatic:   Recent Labs     01/04/25  1740 01/05/25  0410 01/06/25  1004   AST 31 18 17   ALT 40 25 21   BILITOT 0.6 0.5 0.4   ALKPHOS 96 68 59     Mag:      Recent Labs     01/04/25  1740 01/06/25  1004   MG 2.2 2.1      Phos:   No results for input(s): \"PHOS\" in the last 72 hours.   INR:   Recent Labs     01/04/25 2121   INR 1.5*         Radiology Review: KUB with improving bowel dilatation      Assessment  Edward Barlow is a 74 y.o. male admitted for chronic pancreatitis, SBO.  Abdomen remains soft without tenderness      Plan    1.  SBO  CT images reviewed yesterday with likely ileus rather than SBO.  His abdomen remains

## 2025-01-08 ENCOUNTER — ANESTHESIA EVENT (OUTPATIENT)
Age: 75
End: 2025-01-08
Payer: MEDICARE

## 2025-01-08 ENCOUNTER — ANESTHESIA (OUTPATIENT)
Age: 75
End: 2025-01-08
Payer: MEDICARE

## 2025-01-08 LAB
GLUCOSE BLD-MCNC: 100 MG/DL (ref 70–99)
GLUCOSE BLD-MCNC: 132 MG/DL (ref 70–99)
GLUCOSE BLD-MCNC: 84 MG/DL (ref 70–99)
GLUCOSE BLD-MCNC: 98 MG/DL (ref 70–99)
PARTIAL THROMBOPLASTIN TIME: 27.4 SEC (ref 22.1–36.4)
PARTIAL THROMBOPLASTIN TIME: >180 SEC (ref 22.1–36.4)
PREALB SERPL-MCNC: 10.1 MG/DL (ref 20–40)

## 2025-01-08 PROCEDURE — 2709999900 HC NON-CHARGEABLE SUPPLY: Performed by: INTERNAL MEDICINE

## 2025-01-08 PROCEDURE — 2060000000 HC ICU INTERMEDIATE R&B

## 2025-01-08 PROCEDURE — 6360000002 HC RX W HCPCS: Performed by: NURSE ANESTHETIST, CERTIFIED REGISTERED

## 2025-01-08 PROCEDURE — 6360000002 HC RX W HCPCS: Performed by: NURSE PRACTITIONER

## 2025-01-08 PROCEDURE — 6360000002 HC RX W HCPCS: Performed by: STUDENT IN AN ORGANIZED HEALTH CARE EDUCATION/TRAINING PROGRAM

## 2025-01-08 PROCEDURE — 82962 GLUCOSE BLOOD TEST: CPT

## 2025-01-08 PROCEDURE — 7100000001 HC PACU RECOVERY - ADDTL 15 MIN: Performed by: INTERNAL MEDICINE

## 2025-01-08 PROCEDURE — 3609013300 HC EGD TUBE PLACEMENT: Performed by: INTERNAL MEDICINE

## 2025-01-08 PROCEDURE — 0DH63UZ INSERTION OF FEEDING DEVICE INTO STOMACH, PERCUTANEOUS APPROACH: ICD-10-PCS | Performed by: INTERNAL MEDICINE

## 2025-01-08 PROCEDURE — 85730 THROMBOPLASTIN TIME PARTIAL: CPT

## 2025-01-08 PROCEDURE — 3700000001 HC ADD 15 MINUTES (ANESTHESIA): Performed by: INTERNAL MEDICINE

## 2025-01-08 PROCEDURE — 2500000003 HC RX 250 WO HCPCS: Performed by: NURSE PRACTITIONER

## 2025-01-08 PROCEDURE — 7100000000 HC PACU RECOVERY - FIRST 15 MIN: Performed by: INTERNAL MEDICINE

## 2025-01-08 PROCEDURE — 36415 COLL VENOUS BLD VENIPUNCTURE: CPT

## 2025-01-08 PROCEDURE — 6360000002 HC RX W HCPCS: Performed by: INTERNAL MEDICINE

## 2025-01-08 PROCEDURE — 3E0436Z INTRODUCTION OF NUTRITIONAL SUBSTANCE INTO CENTRAL VEIN, PERCUTANEOUS APPROACH: ICD-10-PCS | Performed by: INTERNAL MEDICINE

## 2025-01-08 PROCEDURE — 3700000000 HC ANESTHESIA ATTENDED CARE: Performed by: INTERNAL MEDICINE

## 2025-01-08 PROCEDURE — 2580000003 HC RX 258: Performed by: INTERNAL MEDICINE

## 2025-01-08 RX ORDER — PROPOFOL 10 MG/ML
INJECTION, EMULSION INTRAVENOUS
Status: DISCONTINUED | OUTPATIENT
Start: 2025-01-08 | End: 2025-01-08 | Stop reason: SDUPTHER

## 2025-01-08 RX ORDER — ONDANSETRON 2 MG/ML
4 INJECTION INTRAMUSCULAR; INTRAVENOUS
Status: DISCONTINUED | OUTPATIENT
Start: 2025-01-08 | End: 2025-01-08 | Stop reason: HOSPADM

## 2025-01-08 RX ORDER — NALOXONE HYDROCHLORIDE 0.4 MG/ML
INJECTION, SOLUTION INTRAMUSCULAR; INTRAVENOUS; SUBCUTANEOUS PRN
Status: DISCONTINUED | OUTPATIENT
Start: 2025-01-08 | End: 2025-01-08 | Stop reason: HOSPADM

## 2025-01-08 RX ORDER — LIDOCAINE HYDROCHLORIDE 20 MG/ML
INJECTION, SOLUTION EPIDURAL; INFILTRATION; INTRACAUDAL; PERINEURAL
Status: DISCONTINUED | OUTPATIENT
Start: 2025-01-08 | End: 2025-01-08 | Stop reason: SDUPTHER

## 2025-01-08 RX ORDER — INSULIN LISPRO 100 [IU]/ML
0-4 INJECTION, SOLUTION INTRAVENOUS; SUBCUTANEOUS EVERY 6 HOURS
Status: DISCONTINUED | OUTPATIENT
Start: 2025-01-08 | End: 2025-01-17 | Stop reason: HOSPADM

## 2025-01-08 RX ADMIN — SODIUM ACETATE: 328 INJECTION, SOLUTION, CONCENTRATE INTRAVENOUS at 18:39

## 2025-01-08 RX ADMIN — PROPOFOL 180 MCG/KG/MIN: 10 INJECTION, EMULSION INTRAVENOUS at 11:52

## 2025-01-08 RX ADMIN — HYDROMORPHONE HYDROCHLORIDE 0.25 MG: 1 INJECTION, SOLUTION INTRAMUSCULAR; INTRAVENOUS; SUBCUTANEOUS at 19:58

## 2025-01-08 RX ADMIN — HYDROMORPHONE HYDROCHLORIDE 0.5 MG: 1 INJECTION, SOLUTION INTRAMUSCULAR; INTRAVENOUS; SUBCUTANEOUS at 13:03

## 2025-01-08 RX ADMIN — PROPOFOL 70 MG: 10 INJECTION, EMULSION INTRAVENOUS at 11:51

## 2025-01-08 RX ADMIN — HEPARIN SODIUM 27 UNITS/KG/HR: 10000 INJECTION, SOLUTION INTRAVENOUS at 02:12

## 2025-01-08 RX ADMIN — MEROPENEM 1000 MG: 1 INJECTION INTRAVENOUS at 02:14

## 2025-01-08 RX ADMIN — MICAFUNGIN SODIUM 100 MG: 100 INJECTION, POWDER, LYOPHILIZED, FOR SOLUTION INTRAVENOUS at 23:05

## 2025-01-08 RX ADMIN — LIDOCAINE HYDROCHLORIDE 80 MG: 20 INJECTION, SOLUTION EPIDURAL; INFILTRATION; INTRACAUDAL; PERINEURAL at 11:51

## 2025-01-08 RX ADMIN — MEROPENEM 1000 MG: 1 INJECTION INTRAVENOUS at 19:55

## 2025-01-08 ASSESSMENT — PAIN - FUNCTIONAL ASSESSMENT
PAIN_FUNCTIONAL_ASSESSMENT: PREVENTS OR INTERFERES SOME ACTIVE ACTIVITIES AND ADLS
PAIN_FUNCTIONAL_ASSESSMENT: ADULT NONVERBAL PAIN SCALE (NPVS)
PAIN_FUNCTIONAL_ASSESSMENT: ADULT NONVERBAL PAIN SCALE (NPVS)
PAIN_FUNCTIONAL_ASSESSMENT: 0-10
PAIN_FUNCTIONAL_ASSESSMENT: ADULT NONVERBAL PAIN SCALE (NPVS)
PAIN_FUNCTIONAL_ASSESSMENT: ACTIVITIES ARE NOT PREVENTED

## 2025-01-08 ASSESSMENT — PAIN SCALES - GENERAL
PAINLEVEL_OUTOF10: 6
PAINLEVEL_OUTOF10: 4
PAINLEVEL_OUTOF10: 0
PAINLEVEL_OUTOF10: 3
PAINLEVEL_OUTOF10: 0

## 2025-01-08 ASSESSMENT — PAIN DESCRIPTION - ORIENTATION
ORIENTATION: LEFT
ORIENTATION: MID

## 2025-01-08 ASSESSMENT — PAIN DESCRIPTION - DESCRIPTORS
DESCRIPTORS: SHARP
DESCRIPTORS: SHARP
DESCRIPTORS: ACHING

## 2025-01-08 ASSESSMENT — PAIN SCALES - WONG BAKER
WONGBAKER_NUMERICALRESPONSE: NO HURT

## 2025-01-08 ASSESSMENT — PAIN DESCRIPTION - LOCATION
LOCATION: ABDOMEN
LOCATION: ABDOMEN

## 2025-01-08 NOTE — CARE COORDINATION
[facility] has checked home IV benefits and are as follows:    Sergio states TPN not covered by medicare. Sergio is now trying to see if [facility]-care will cover it.    Home health provider is [facility]

## 2025-01-08 NOTE — PROGRESS NOTES
Tried multiple times to draw blood from [facility], unable.  Spoke with lab and they are coming to draw his aptt.

## 2025-01-08 NOTE — FLOWSHEET NOTE
01/07/25 2121   Assessment   Charting Type Shift assessment   Psychosocial   Psychosocial (WDL) WDL   Neurological   Neuro (WDL) WDL   Level of Consciousness 0   Orientation Level Oriented X4   Cognition Appropriate judgement;Appropriate safety awareness;Appropriate attention/concentration;Appropriate for developmental age;Follows commands;No short term memory loss   Speech Clear   R Pupil Size (mm) 3   R Pupil Shape Round   R Pupil Reaction Brisk   L Pupil Size (mm) 3   L Pupil Shape Round   L Pupil Reaction Brisk   Cough Reflex Present   [place] Coma Scale   Eye Opening 4   Best Verbal Response 5   Best Motor Response 6   [place] Coma Scale Score 15   HEENT (Head, Ears, Eyes, Nose, & Throat)   HEENT (WDL) X   Right Eye Glasses;Impaired vision   Left Eye Glasses;Impaired vision   Right Ear Impaired hearing   Left Ear Impaired hearing   Nose Foreign body  (NG)   Respiratory   Respiratory (WDL) WDL   Subcutaneous Air/Crepitus None   Respiratory Pattern Regular   Respiratory Depth Normal   Respiratory Quality/Effort Unlabored   Chest Assessment Chest expansion symmetrical   L Breath Sounds Clear;Diminished   R Breath Sounds Clear;Diminished   Breath Sounds   Right Upper Lobe Diminished   Right Middle Lobe Diminished   Right Lower Lobe Diminished   Left Upper Lobe Diminished   Left Lower Lobe Diminished   Cardiac   Cardiac (WDL) WDL   Cardiac Regularity Regular   Heart Sounds S1, S2   Cardiac Rhythm Sinus rhythm   Cardiac Monitor   Alarm Audible Yes   Telemetry Monitor Alarm Parameters    Pacemaker   Pacemaker No   Gastrointestinal   Abdominal (WDL) X   Pre Hospital Interventions Stool softener   RUQ Bowel Sounds Hypoactive   LUQ Bowel Sounds Hypoactive   RLQ Bowel Sounds Hypoactive   LLQ Bowel Sounds Hypoactive   Abdomen Inspection Rounded;Distended   GI Symptoms Constipation;Distention   Tenderness Tenderness   NG/OG/NJ/NE Tube Nasogastric 16 fr Right nostril   Placement Date/Time: 01/04/25 1954   Present on

## 2025-01-08 NOTE — CONSULTS
[facility]    Pharmacy Progress Note    TPN - Management by Pharmacy    Consult Date(s): 1/8/25  Consulting Provider(s): Ashley Hooker CNP    Assessment / Plan  Parenteral Nutrition  Day of Therapy: 1   IV access: PICC  Formulation:  Clinimix 5/20  Clinimix Rate:  40 mL/hr (Total volume = 960mL/day)  Goal rate 83 ml/hr (1992 ml)  Lipids:  Hold at this time due to pancreatitis  Appreciate Clinical Dietitian's recommendations for formulation and goal rate.  Electrolytes:  Due to backorder of Clinimix E, pharmacy will compound custom electrolyte formula. Further electrolyte management per pharmacy.    Maintenance IVF:  none  Glucose control: no history of DM. Will order q6h accuchecks and low ssi.   Add MVI 10 mL/bag in PN on Mon-Wed-Fri only (due to national shortage) & Trace Elements 1 mL/bag in PN daily.  Daily renal panel, daily magnesium, and weekly triglycerides ordered per protocol.  PN will be re-ordered daily.    Thank you for consulting pharmacy,    Srikanth RibeiroD, BCPS        Subjective/Objective:   Edward Barlow is a 74 y.o. male with a PMHx significant for recently diagnosed pulmonary embolism on Eliquis, chronic pancreatitis  who is admitted with partial SBO.     Pharmacy is consulted to dose TPN.    Ht Readings from Last 1 Encounters:   01/06/25 1.778 m (5' 10\")     Wt Readings from Last 1 Encounters:   01/05/25 64.9 kg (143 lb 1.3 oz)     Recent Labs     01/06/25  1004      K 3.5      CO2 21   GLUCOSE 101*   BUN 14   CREATININE 0.9   CALCIUM 9.5   MG 2.1     Albumin   Date Value Ref Range Status   01/06/2025 2.6 (L) 3.4 - 5.0 g/dL Final     Corrected Calcium: 10.6 mg/dL    Recent Labs     01/07/25  2016 01/07/25  2328 01/08/25  0344 01/08/25  0909   POCGLU 107* 90 98 100*     Sliding scale insulin used since PN bag hung yesterday: New start    Recent Labs     01/06/25  1004 01/06/25  1743 01/07/25  0006 01/07/25  0518   WBC 8.8  --   --   --    HGB 10.7*   < >

## 2025-01-08 NOTE — PROGRESS NOTES
APTT greater than 180, paused heparin gtt for 60min     0320: Heparin gtt restarted at new decreased rate se MAR

## 2025-01-08 NOTE — PERIOP NOTE
Pt arrived from endoscopy to [facility] room 6; spouse is awaiting patient at bedside; Report recvd from Bayron GIVENS and Richelle CLARK RN; Patient appears comfortable during sleep. Pt not arousable to voice. RN at bedside to monitor for s/s of discomfort; intermittent congested cough during rest, which pt's spouse reports as baseline. No surgical incisions visible; New PEG tube is open to air; with no evidence of drainage at this time. Patient placed on 2LNCO2 for increased perfusion; This RN changed pt's PIV dressing on RFA and NSR on tele with occasional PVC, VSS. Will continue to monitor.

## 2025-01-08 NOTE — PROGRESS NOTES
Patient was safely transported to the unit by stretcher and this RN assisted to transfer the patient to inpt bed; RN met me upon arrival; patient's cardiac monitor was on and patient was attached to bedside monitor; he opened his eyes during the bed-to-bed transfer but otherwise remained asleep; patient appears comfortable; [facility] care complete

## 2025-01-08 NOTE — H&P
Gastroenterology Note             Pre-operative History and Physical    Patient: Edward Barlow  : 1950  CSN:     History Obtained From:  patient and/or guardian.     HISTORY OF PRESENT ILLNESS:    The patient is a 74 y.o. male  here for EGD and PEG tube placement    This a very pleasant 74-year-old male who had a bad bout of pancreatitis we had a nasojejunal feeding tube in which she did well with but then he developed a blood clot in his lung and the nasal jejunal feeding tube became clogged he had a distended stomach he has an NG tube in now which is decompressed the stomach his last CT scan looked very good except for 2 air bubbles still around the pancreas replacing a percutaneous endoscopic gastrostomy today so that we can improve his nutrition and build him the patient does have protein calorie malnourishment    Past Medical History:    Past Medical History:   Diagnosis Date    Bladder cancer (HCC) 2014    Celiac disease     Hyperlipidemia     Hypertension     Nausea and vomiting 2025    TIA (transient ischemic attack)      Past Surgical History:    Past Surgical History:   Procedure Laterality Date    ERCP N/A 10/23/2024    ENDOSCOPIC RETROGRADE CHOLANGIOPANCREATOGRAPHY SPHINCTER/PAPILLOTOMY performed by Tom Dias MD at [facility] ENDOSCOPY    ERCP N/A 10/23/2024    ENDOSCOPIC RETROGRADE CHOLANGIOPANCREATOGRAPHY STONE REMOVAL performed by Tom Dias MD at [facility] ENDOSCOPY    ERCP N/A 10/23/2024    ENDOSCOPIC RETROGRADE CHOLANGIOPANCREATOGRAPHY STENT INSERTION performed by Tom Dias MD at [facility] ENDOSCOPY    MASTECTOMY      double    TUMOR REMOVAL  2014    positive bladder tumor    UPPER GASTROINTESTINAL ENDOSCOPY N/A 10/23/2024    ENDOSCOPIC ULTRASOUND with biopsy performed by Tom Dias MD at [facility] ENDOSCOPY    UPPER GASTROINTESTINAL ENDOSCOPY N/A 2024    ESOPHAGOGASTRODUODENOSCOPY SUBMUCOSAL INJECTION performed by Tom Dias MD at [facility] ENDOSCOPY    UPPER GASTROINTESTINAL

## 2025-01-08 NOTE — PROGRESS NOTES
Hospitalist Progress Note      Name:  Edward Barlow /Age/Sex: 1950  (74 y.o. male)   MRN & CSN:  [id number] & [id number] Encounter Date/Time: 2025 5:05 PM EST    Location:  3221/3221-01 PCP: Ellis Ordonez MD       Hospital Day: 5         Date of Admission: 2025    Chief Complaint: Nausea and vomiting    Hospital Course: Edward Barlow is a 74 y.o. male with past medical history of recently diagnosed pulmonary embolism on Eliquis, chronic pancreatitis status post NG tube placement, recent community-acquired pneumonia class III malnutrition, celiac disease, hypertension, hyperlipidemia, history of TIA and bladder cancer.  He presented to the emergency department on account of nausea and vomiting of 1 day duration.  He had an NG tube placed 2024 but this was discontinued yesterday -- it was blocked after his diet has been expanded under GI oversight.  Patient denies any other symptoms.       CT of the abdomen pelvis showed some improvement in pancreatic findings but an increase in gastric distention and soft tissue thickening suspicious for SBO in the transverse duodenum, another small bowel dilation and transition point in right abdomen concerning for an additional small bowel obstruction. No significant biliary duct dilatation with biliary stent in place. There is also less conspicuous RLL pulm embolism, with improvement bilateral lower lobe consolidations.       Subjective: Patient seen examined at bedside postprocedure, patient returned from endoscopy area status post PEG tube placement.  Patient is awake and alert denies any new concerns.  Denies any nausea or vomiting.  Denies any abdominal pain.    Will continue heparin drip until cleared by GI to resume home Eliquis.  Consult to pharmacy to initiate TPN, dietitian has placed recommendations.    Assessment and Recommendations:    Small bowel obstruction: Improved-  noted on imaging and transverse duodenum with an additional transition

## 2025-01-08 NOTE — CONSULTS
Nutrition Note    Consult \"TF ordering and management; PN recommendations only\". Pt s/p PEG placement today. Dr. Dias wanting to start TF. TPN also started per pharmacy.     Recommendations provided for TPN:  Recommend check TG, Mg, Phos, CMP now if not done in last 24 hours.  Bag 1: Clinimix 5/20 starting at 40 mL/hr  Physician/LIP to monitor closely and correct lytes (Phos,Mg,K+) d/t risk of refeeding syndrome  Recommend holding lipids at this time d/t pancreatitis.  Recommend FSBS, monitor glucose, need for insulin  Pharmacy to adjust MVI and Trace Elements as needed     Recommend for TF :  Changed TF regimen d/t lower fat content in Jevity vs peptide based.  Jevity 1.5 (standard with fiber) @ 20 mL/hr. Advance slowly, 15 mL/hr q 12hrs until goal rate reached of 65 mL/hr. Flush per provider.      Please see RD note from 1/6 for full nutrition assessment.    Electronically signed by Leola Hernandez RD, LD on 1/8/25 at 1:25 PM EST    Contact: Bessy Smart Dietitian  Leola Hernandez RD, LD: [phone]

## 2025-01-08 NOTE — PERIOP NOTE
Patient became alert with weak congested cough and was able to expel a moderate amount of secretions; patient's sat remains stable; and patient reports 3/10 stabbing, constant pain at peg site; Patient was given 0.5 mg IV dilaudid per standing pacu orders; also discussed care with Dr Castillo CRNA; patient will be appropriate for transport to the inpt unit once he is comfortable and report is called to inpt RN

## 2025-01-08 NOTE — PROGRESS NOTES
Patient aptt at 27.4. per gregory from pharmacy she recommended restarting heparin at previous dose of 24 units/kg/hr. Another aptt is timed for 0100.  Tyesha Wilkinson RN

## 2025-01-08 NOTE — CARE COORDINATION
Discharge Planning Note:    Chart reviewed and pt has possible IV medication needs upon discharge.  SW/CM contacted Sergio  with [employer] requesting home infusion benefits check.  Sergio to report back with benefit allowances.       Pt received peg tube today and they are planning TPN to start.

## 2025-01-08 NOTE — PROGRESS NOTES
Patient in endoscopy during rounds for PEG placement  Okay to place PEG to gravity drainage bag and remove NG from my standpoint  TPN per GI team    Electronically signed by Dimitry Garcia MD on 1/8/2025 at 12:33 PM

## 2025-01-08 NOTE — PROGRESS NOTES
Spiritual Health History and Assessment/Progress Note  [facility]    Initial Encounter,  ,  ,      Name: Edward Barlow MRN: [id number]    Age: 74 y.o.     Sex: male   Language: English   Sabianism: Unknown   SBO (small bowel obstruction) ([facility])     Date: 1/8/2025            Total Time Calculated: 1 min              Spiritual Assessment began in [facility] 3 PROGRESSIVE CARE        Referral/Consult From: Rounding   Encounter Overview/Reason: Initial Encounter  Service Provided For: Patient and family together    Marisol, Belief, Meaning:   Patient unable to assess at this time  Family/Friends Other: unable to assess at this time      Importance and Influence:  Patient unable to assess at this time  Family/Friends Other: unable to assess at this time    Community:  Patient Other: unable to assess at this time  Family/Friends Other: unable to assess at this time    Assessment and Plan of Care:     Patient Interventions include: Other: unable to assess at this time  Family/Friends Interventions include: Other: unable to assess    Patient Plan of Care: Other: unable to assess  Family/Friends Plan of Care: Other: unable to assess    Electronically signed by CELIA Lazo on 1/8/2025 at 4:19 PM

## 2025-01-08 NOTE — PERIOP NOTE
Patient to return to inpt unit PCU for phase 2 care, since he is a current inpt; patient is to remain NPO for 6 hours; patient still sleeping and shows minimal response to voice and phys stim; appears comfortable with no evidence of nausea or GI upset

## 2025-01-08 NOTE — ANESTHESIA POSTPROCEDURE EVALUATION
05:18 AM        HCT                      30.6 (L)            01/07/2025 05:18 AM        PLT                      204                 01/06/2025 10:04 AM   RENAL  Lab Results       Component                Value               Date/Time                  NA                       140                 01/06/2025 10:04 AM        K                        3.5                 01/06/2025 10:04 AM        K                        3.9                 09/25/2024 09:06 AM        CL                       109                 01/06/2025 10:04 AM        CO2                      21                  01/06/2025 10:04 AM        BUN                      14                  01/06/2025 10:04 AM        CREATININE               0.9                 01/06/2025 10:04 AM        GLUCOSE                  101 (H)             01/06/2025 10:04 AM   COAGS  Lab Results       Component                Value               Date/Time                  PROTIME                  18.6 (H)            01/04/2025 09:21 PM        INR                      1.5 (H)             01/04/2025 09:21 PM        APTT                     >180.0 (HH)         01/08/2025 01:45 AM     Intake & Output:  @24HRIO@    Nausea & Vomiting:  No    Level of Consciousness:  Awake    Pain Assessment:  Adequate analgesia    Anesthesia Complications:  No apparent anesthetic complications    SUMMARY      Vital signs stable  OK to discharge from Stage I post anesthesia care.  Care transferred from Anesthesiology department on discharge from perioperative area   Pain management: satisfactory to patient    No notable events documented.

## 2025-01-08 NOTE — PLAN OF CARE
Problem: Discharge Planning  Goal: Discharge to home or other facility with appropriate resources  1/7/2025 2125 by Lisa Ludwig RN  Outcome: Progressing

## 2025-01-09 LAB
ANION GAP SERPL CALCULATED.3IONS-SCNC: 9 MMOL/L (ref 3–16)
BUN SERPL-MCNC: 10 MG/DL (ref 7–20)
CALCIUM SERPL-MCNC: 9.1 MG/DL (ref 8.3–10.6)
CHLORIDE SERPL-SCNC: 106 MMOL/L (ref 99–110)
CO2 SERPL-SCNC: 25 MMOL/L (ref 21–32)
CREAT SERPL-MCNC: 0.6 MG/DL (ref 0.8–1.3)
ERYTHROCYTE [DISTWIDTH] IN BLOOD BY AUTOMATED COUNT: 15.2 % (ref 12.4–15.4)
GFR, ESTIMATED: >90 ML/MIN/1.73M2
GLUCOSE BLD-MCNC: 132 MG/DL (ref 70–99)
GLUCOSE BLD-MCNC: 135 MG/DL (ref 70–99)
GLUCOSE BLD-MCNC: 136 MG/DL (ref 70–99)
GLUCOSE BLD-MCNC: 144 MG/DL (ref 70–99)
GLUCOSE BLD-MCNC: 159 MG/DL (ref 70–99)
GLUCOSE SERPL-MCNC: 152 MG/DL (ref 70–99)
HCT VFR BLD AUTO: 32.9 % (ref 40.5–52.5)
HGB BLD-MCNC: 11 G/DL (ref 13.5–17.5)
MAGNESIUM SERPL-MCNC: 2 MG/DL (ref 1.8–2.4)
MCH RBC QN AUTO: 29.1 PG (ref 26–34)
MCHC RBC AUTO-ENTMCNC: 33.4 G/DL (ref 31–36)
MCV RBC AUTO: 87 FL (ref 80–100)
MICROORGANISM SPEC CULT: NORMAL
MICROORGANISM SPEC CULT: NORMAL
PARTIAL THROMBOPLASTIN TIME: 102.5 SEC (ref 22.1–36.4)
PARTIAL THROMBOPLASTIN TIME: 105.1 SEC (ref 22.1–36.4)
PARTIAL THROMBOPLASTIN TIME: 116.9 SEC (ref 22.1–36.4)
PARTIAL THROMBOPLASTIN TIME: 92.9 SEC (ref 22.1–36.4)
PHOSPHATE SERPL-MCNC: 2.1 MG/DL (ref 2.5–4.9)
PLATELET # BLD AUTO: 188 K/UL (ref 135–450)
PMV BLD AUTO: 9.8 FL (ref 9.4–12.4)
POTASSIUM SERPL-SCNC: 3.1 MMOL/L (ref 3.5–5.1)
RBC # BLD AUTO: 3.78 M/UL (ref 4.2–5.9)
SERVICE CMNT-IMP: NORMAL
SERVICE CMNT-IMP: NORMAL
SODIUM SERPL-SCNC: 139 MMOL/L (ref 136–145)
SPECIMEN DESCRIPTION: NORMAL
SPECIMEN DESCRIPTION: NORMAL
WBC OTHER # BLD: 7.9 K/UL (ref 4–11)

## 2025-01-09 PROCEDURE — 84100 ASSAY OF PHOSPHORUS: CPT

## 2025-01-09 PROCEDURE — 6360000002 HC RX W HCPCS: Performed by: NURSE PRACTITIONER

## 2025-01-09 PROCEDURE — 36415 COLL VENOUS BLD VENIPUNCTURE: CPT

## 2025-01-09 PROCEDURE — 6360000002 HC RX W HCPCS: Performed by: STUDENT IN AN ORGANIZED HEALTH CARE EDUCATION/TRAINING PROGRAM

## 2025-01-09 PROCEDURE — 85730 THROMBOPLASTIN TIME PARTIAL: CPT

## 2025-01-09 PROCEDURE — 84478 ASSAY OF TRIGLYCERIDES: CPT

## 2025-01-09 PROCEDURE — 82962 GLUCOSE BLOOD TEST: CPT

## 2025-01-09 PROCEDURE — 6370000000 HC RX 637 (ALT 250 FOR IP): Performed by: SURGERY

## 2025-01-09 PROCEDURE — 80048 BASIC METABOLIC PNL TOTAL CA: CPT

## 2025-01-09 PROCEDURE — 85027 COMPLETE CBC AUTOMATED: CPT

## 2025-01-09 PROCEDURE — 2580000003 HC RX 258: Performed by: NURSE PRACTITIONER

## 2025-01-09 PROCEDURE — 2580000003 HC RX 258: Performed by: STUDENT IN AN ORGANIZED HEALTH CARE EDUCATION/TRAINING PROGRAM

## 2025-01-09 PROCEDURE — 2500000003 HC RX 250 WO HCPCS: Performed by: NURSE PRACTITIONER

## 2025-01-09 PROCEDURE — 6360000002 HC RX W HCPCS: Performed by: INTERNAL MEDICINE

## 2025-01-09 PROCEDURE — 2500000003 HC RX 250 WO HCPCS: Performed by: STUDENT IN AN ORGANIZED HEALTH CARE EDUCATION/TRAINING PROGRAM

## 2025-01-09 PROCEDURE — 83735 ASSAY OF MAGNESIUM: CPT

## 2025-01-09 PROCEDURE — 2060000000 HC ICU INTERMEDIATE R&B

## 2025-01-09 PROCEDURE — 2580000003 HC RX 258: Performed by: INTERNAL MEDICINE

## 2025-01-09 PROCEDURE — 2500000003 HC RX 250 WO HCPCS: Performed by: INTERNAL MEDICINE

## 2025-01-09 PROCEDURE — 99233 SBSQ HOSP IP/OBS HIGH 50: CPT | Performed by: INTERNAL MEDICINE

## 2025-01-09 PROCEDURE — 99232 SBSQ HOSP IP/OBS MODERATE 35: CPT | Performed by: SURGERY

## 2025-01-09 RX ORDER — BISACODYL 10 MG
10 SUPPOSITORY, RECTAL RECTAL ONCE
Status: COMPLETED | OUTPATIENT
Start: 2025-01-09 | End: 2025-01-09

## 2025-01-09 RX ORDER — POLYETHYLENE GLYCOL 3350 17 G/17G
17 POWDER, FOR SOLUTION ORAL DAILY
Status: DISCONTINUED | OUTPATIENT
Start: 2025-01-09 | End: 2025-01-12

## 2025-01-09 RX ADMIN — BISACODYL 10 MG: 10 SUPPOSITORY RECTAL at 10:10

## 2025-01-09 RX ADMIN — Medication 40 MG: at 10:13

## 2025-01-09 RX ADMIN — HYDROMORPHONE HYDROCHLORIDE 0.5 MG: 1 INJECTION, SOLUTION INTRAMUSCULAR; INTRAVENOUS; SUBCUTANEOUS at 15:43

## 2025-01-09 RX ADMIN — HYDROMORPHONE HYDROCHLORIDE 0.25 MG: 1 INJECTION, SOLUTION INTRAMUSCULAR; INTRAVENOUS; SUBCUTANEOUS at 22:25

## 2025-01-09 RX ADMIN — MEROPENEM 1000 MG: 1 INJECTION INTRAVENOUS at 23:47

## 2025-01-09 RX ADMIN — SODIUM ACETATE: 328 INJECTION, SOLUTION, CONCENTRATE INTRAVENOUS at 18:00

## 2025-01-09 RX ADMIN — POLYETHYLENE GLYCOL 3350 17 G: 17 POWDER, FOR SOLUTION ORAL at 10:09

## 2025-01-09 RX ADMIN — SODIUM CHLORIDE, PRESERVATIVE FREE 10 ML: 5 INJECTION INTRAVENOUS at 10:24

## 2025-01-09 RX ADMIN — HEPARIN SODIUM 22 UNITS/KG/HR: 10000 INJECTION, SOLUTION INTRAVENOUS at 10:09

## 2025-01-09 RX ADMIN — HYDROMORPHONE HYDROCHLORIDE 0.25 MG: 1 INJECTION, SOLUTION INTRAMUSCULAR; INTRAVENOUS; SUBCUTANEOUS at 07:17

## 2025-01-09 RX ADMIN — MEROPENEM 1000 MG: 1 INJECTION INTRAVENOUS at 03:42

## 2025-01-09 RX ADMIN — POTASSIUM PHOSPHATE, MONOBASIC POTASSIUM PHOSPHATE, DIBASIC 15 MMOL: 224; 236 INJECTION, SOLUTION, CONCENTRATE INTRAVENOUS at 17:29

## 2025-01-09 RX ADMIN — MICAFUNGIN SODIUM 100 MG: 100 INJECTION, POWDER, LYOPHILIZED, FOR SOLUTION INTRAVENOUS at 22:23

## 2025-01-09 RX ADMIN — MEROPENEM 1000 MG: 1 INJECTION INTRAVENOUS at 11:48

## 2025-01-09 ASSESSMENT — PAIN DESCRIPTION - DESCRIPTORS
DESCRIPTORS: ACHING
DESCRIPTORS: DISCOMFORT
DESCRIPTORS: DISCOMFORT

## 2025-01-09 ASSESSMENT — PAIN SCALES - WONG BAKER: WONGBAKER_NUMERICALRESPONSE: NO HURT

## 2025-01-09 ASSESSMENT — PAIN SCALES - GENERAL
PAINLEVEL_OUTOF10: 1
PAINLEVEL_OUTOF10: 4
PAINLEVEL_OUTOF10: 7
PAINLEVEL_OUTOF10: 1
PAINLEVEL_OUTOF10: 2
PAINLEVEL_OUTOF10: 3

## 2025-01-09 ASSESSMENT — PAIN DESCRIPTION - FREQUENCY: FREQUENCY: CONTINUOUS

## 2025-01-09 ASSESSMENT — PAIN DESCRIPTION - LOCATION
LOCATION: ABDOMEN

## 2025-01-09 ASSESSMENT — PAIN DESCRIPTION - ORIENTATION
ORIENTATION: MID

## 2025-01-09 ASSESSMENT — PAIN - FUNCTIONAL ASSESSMENT
PAIN_FUNCTIONAL_ASSESSMENT: ACTIVITIES ARE NOT PREVENTED

## 2025-01-09 ASSESSMENT — PAIN DESCRIPTION - ONSET: ONSET: ON-GOING

## 2025-01-09 ASSESSMENT — PAIN DESCRIPTION - PAIN TYPE: TYPE: SURGICAL PAIN

## 2025-01-09 NOTE — CONSULTS
[facility]    Pharmacy Progress Note    TPN - Management by Pharmacy    Consult Date(s): 1/8/25  Consulting Provider(s): Ashley Hooker CNP    Assessment / Plan  Parenteral Nutrition  Day of Therapy: 2   IV access: PICC  Formulation:  Clinimix 5/20  Clinimix Rate: Goal rate 83 ml/hr (1992 ml)  Lipids:  Hold at this time due to pancreatitis  Appreciate Clinical Dietitian's recommendations for formulation and goal rate.  Electrolytes:  Due to backorder of Clinimix E, pharmacy will compound custom electrolyte formula. Further electrolyte management per pharmacy.    Phosphorus 2.1 mg/L-replace with 15 mmol Kphos IVPB. Additional Phosphorus and Potassium added to TPN.   Patient tolerating TF at 35 ml/hr-goal 65 ml/hr  Wean TPN once tolerating goal TF.   Maintenance IVF:  none  Glucose control: no history of DM. Will order q6h accuchecks and low ssi.   Add MVI 10 mL/bag in PN on Mon-Wed-Fri only (due to national shortage) & Trace Elements 1 mL/bag in PN daily.  Daily renal panel, daily magnesium, and weekly triglycerides ordered per protocol.  PN will be re-ordered daily.    Thank you for consulting pharmacy,    Srikanth RibeiroD, BCPS        Subjective/Objective:   Edward Barlow is a 74 y.o. male with a PMHx significant for recently diagnosed pulmonary embolism on Eliquis, chronic pancreatitis  who is admitted with partial SBO.     Pharmacy is consulted to dose TPN.    Ht Readings from Last 1 Encounters:   01/06/25 1.778 m (5' 10\")     Wt Readings from Last 1 Encounters:   01/05/25 64.9 kg (143 lb 1.3 oz)     Recent Labs     01/09/25  0729      K 3.1*      CO2 25   PHOS 2.1*   GLUCOSE 152*   BUN 10   CREATININE 0.6*   CALCIUM 9.1   MG 2.0     Albumin   Date Value Ref Range Status   01/06/2025 2.6 (L) 3.4 - 5.0 g/dL Final       Recent Labs     01/08/25  2102 01/09/25  0234 01/09/25  0900 01/09/25  1125   POCGLU 132* 159* 135* 144*     Sliding scale insulin used since PN bag hung

## 2025-01-09 NOTE — PROGRESS NOTES
100 mg in sodium chloride 0.9 % 100 mL IVPB (mini-bag)  100 mg IntraVENous Daily    sodium chloride flush  5-40 mL IntraVENous 2 times per day    pantoprazole (PROTONIX) 40 mg in sodium chloride (PF) 0.9 % 10 mL injection  40 mg IntraVENous Daily       Continuous Infusions:   PN-Adult Premix 5/20 - Central 40 mL/hr at 01/08/25 1839    sodium chloride      heparin (PORCINE) Infusion 22 Units/kg/hr (01/09/25 1009)    sodium chloride 5 mL/hr at 01/05/25 1053    dextrose         PRN Meds:  sodium chloride flush, sodium chloride, phenol, heparin (porcine), heparin (porcine), sodium chloride flush, sodium chloride, potassium chloride **OR** potassium alternative oral replacement **OR** potassium chloride, magnesium sulfate, ondansetron **OR** ondansetron, acetaminophen **OR** acetaminophen, HYDROmorphone **OR** HYDROmorphone, droPERidol, glucose, dextrose bolus **OR** dextrose bolus, glucagon (rDNA), dextrose    Imaging:   CT ABDOMEN PELVIS W IV CONTRAST Additional Contrast? None   Final Result      1. Gastric antral thickening likely representing antritis.   2. Associated poor definition of pancreas in the region of the head and proximal   body that may represent secondary pancreatitis.   3. Biliary stent noted in satisfactory location.   4. Diffuse small intestinal distention of moderate severity favoring ileus. No   transition zone visualized.      Electronically signed by Farshad Mendiola      XR ABDOMEN (KUB) (SINGLE AP VIEW)   Final Result      Improvement in the number of centrally dilated loops of small bowel. Biliary stent is again noted.      Electronically signed by Marcello Prasad MD      XR CHEST PORTABLE   Final Result      1.  Nasogastric tube tip projects at the gastric body.      Electronically signed by Paul Rg      CT ABDOMEN PELVIS W IV CONTRAST Additional Contrast? None   Final Result      1.  Evolving pancreatitis with decreased peripancreatic fluid collections and also overall slightly decreased  peripancreatic inflammatory soft tissue attenuation compared to CT from 12/26/2024. However, there is interval increased gastric distention, and    soft tissue thickening results in narrowing of the transverse duodenum concerning for partial obstruction.   2.  Subsequent small bowel dilation and additional transition point in the right abdomen representing additional bowel obstruction.   3.  A few foci of extraluminal gas in the peripancreatic region may relate to patient's medication with bowel or infection. No drainable fluid collection.   4.  Small volume ascites.   5.  Decreased conspicuity of pulmonary embolism in the right lower lobe.   6.  Improving airspace consolidations in the lower lobes representing evolving infectious/inflammatory process.   7.  No significant biliary duct dilation with biliary stent in place.      Electronically signed by Paul Rg          All pertinent images and reports for the current Hospitalization were reviewed by me.    IMPRESSION:    Patient Active Problem List   Diagnosis Code    Celiac sprue K90.0    HTN (hypertension) I10    History of CVA (cerebrovascular accident) without residual deficits Z86.73    Benign prostatic hyperplasia N40.0    Bladder cancer (HCC) C67.9    Acute necrotizing pancreatitis K85.91    Chronic pancreatitis (HCC) K86.1    Severe malnutrition (HCC) E43    Acute pulmonary embolism, unspecified pulmonary embolism type, unspecified whether acute cor pulmonale present ([facility]) I26.99    SBO (small bowel obstruction) ([facility]) K56.609    Abdominal distention R14.0    SHAJI (acute kidney injury) ([facility]) N17.9    Lactic acidosis E87.20    Dehydration E86.0    Nausea and vomiting R11.2    Acute on chronic pancreatitis (HCC) K85.90, K86.1    Neutrophilia D72.828    History of biliary stent insertion Z98.890    Intestinal obstruction (HCC) K56.609        ICD-10-CM    1. Abdominal distention  R14.0       2. Nausea and vomiting, unspecified vomiting type  R11.2       3.

## 2025-01-09 NOTE — PROGRESS NOTES
Physician Progress Note      PATIENT:               TRINA FRANKLIN  CSN #:                  [id number]  :                       1950  ADMIT DATE:       2025 5:16 PM  DISCH DATE:  RESPONDING  PROVIDER #:        RAFA JACOBS          QUERY TEXT:    Pt admitted with SBO, Lactic acidosis, SHAJI, and PNA.  Noted documentation of   Sepsis on 25 by ordered Infectious disease consultant.  If possible,   please document in progress notes and discharge summary:    The medical record reflects the following:  Risk Factors: SBO, PNA  Clinical Indicators: WBC 33.8, lac 3.9, CRP > 70.0. Per ED note, \"abdominal   distention and tenderness\"  Treatment: Blood cx, 1L NS, ID consult, GI consult, Zosyn, Meropenem,   Micafungin  Options provided:  -- Sepsis confirmed present on admission  -- Defer to Infectious disease consultant documentation regarding Sepsis POA.  -- Other - I will add my own diagnosis  -- Disagree - Not applicable / Not valid  -- Disagree - Clinically unable to determine / Unknown  -- Refer to Clinical Documentation Reviewer    PROVIDER RESPONSE TEXT:    I defer to Infectious disease consultant regarding documentation of Sepsis   POA.    Query created by: Mirella Perez on 2025 9:36 AM      Electronically signed by:  RAFA JACOBS 2025 5:54 PM

## 2025-01-09 NOTE — PROGRESS NOTES
General Surgery Progress Note                 PATIENT NAME: Edward Barlow   Medical Record Number: [id number]  YOB: 1950       TODAY'S DATE: 1/9/2025      Chief Complaint   Patient presents with    Nausea    Vomiting         SUBJECTIVE:    Pt reports pain around PEG tube.  + Flatus but denies BM.  Denies nausea or bloating.  Tube feeds running at 35 mL/h       OBJECTIVE:   VITALS:  /77   Pulse 63   Temp 97.9 °F (36.6 °C) (Oral)   Resp 16   Ht 1.778 m (5' 10\")   Wt 64.9 kg (143 lb 1.3 oz)   SpO2 96%   BMI 20.53 kg/m²       INTAKE/OUTPUT:    I/O last 3 completed shifts:  In: 231 [NG/GT:231]  Out: 2500 [Urine:1800; Emesis/NG output:700]  I/O this shift:  In: -   Out: 500 [Urine:500]              GENERAL: alert, no distress    LUNGS: Normal rate and effort  HEART: normal rate and regular rhythm  ABDOMEN: soft, non-tender and non-distended  EXTREMITY: no cyanosis, clubbing or edema      DATA:  CBC:   Recent Labs     01/06/25  1004 01/06/25  1743 01/07/25  0006 01/07/25  0518 01/09/25  0729   WBC 8.8  --   --   --  7.9   HGB 10.7*   < > 10.4* 9.9* 11.0*   HCT 33.5*   < > 32.5* 30.6* 32.9*     --   --   --  188    < > = values in this interval not displayed.     BMP:    Recent Labs     01/06/25  1004 01/09/25  0729    139   K 3.5 3.1*    106   CO2 21 25   BUN 14 10   CREATININE 0.9 0.6*   GLUCOSE 101* 152*     Hepatic:   Recent Labs     01/06/25  1004   AST 17   ALT 21   BILITOT 0.4   ALKPHOS 59     Mag:      Recent Labs     01/06/25  1004 01/09/25  0729   MG 2.1 2.0      Phos:     Recent Labs     01/09/25  0729   PHOS 2.1*      INR:   No results for input(s): \"INR\" in the last 72 hours.        Radiology Review: KUB with improving bowel dilatation      Assessment  Edward Barlow is a 74 y.o. male admitted for chronic pancreatitis, SBO.  Abdomen remains soft without tenderness      Plan    1.  SBO/chronic pancreatitis with partial WHALEY  Discussed with Dr. Dias.  No evidence of  gastric outlet obstruction yesterday on EGD  Continue tube feeds as tolerated.  Hopefully tolerates and can wean TPN.  Suspected ileus on most recent CT.  If fails to tolerate tube feed advancement or has ongoing evidence of ileus then may need UGI with SBFT to reeval  Trial suppository today.  Daily Peggy Garcia MD  [phone]  Electronically signed 1/9/2025 at 8:13 AM

## 2025-01-09 NOTE — PROGRESS NOTES
Hospitalist Progress Note      Name:  Edward Barlow /Age/Sex: 1950  (74 y.o. male)   MRN & CSN:  [id number] & [id number] Encounter Date/Time: 2025 4:47 PM EST    Location:  [id number] PCP: Ellis Ordonez MD       Hospital Day: 6         Date of Admission: 2025    Chief Complaint: Nausea and vomiting    Hospital Course: Edward Barlow is a 74 y.o. male with past medical history of recently diagnosed pulmonary embolism on Eliquis, chronic pancreatitis status post NG tube placement, recent community-acquired pneumonia class III malnutrition, celiac disease, hypertension, hyperlipidemia, history of TIA and bladder cancer.  He presented to the emergency department on account of nausea and vomiting of 1 day duration.  He had an NG tube placed 2024 but this was discontinued yesterday -- it was blocked after his diet has been expanded under GI oversight.  Patient denies any other symptoms.       CT of the abdomen pelvis showed some improvement in pancreatic findings but an increase in gastric distention and soft tissue thickening suspicious for SBO in the transverse duodenum, another small bowel dilation and transition point in right abdomen concerning for an additional small bowel obstruction. No significant biliary duct dilatation with biliary stent in place. There is also less conspicuous RLL pulm embolism, with improvement bilateral lower lobe consolidations.        EGD-PEG tube placement 2025 by GI  EGD-gastritis, normal duodenum, placement of externally removable PEG tube.    Subjective: Patient seen and examined at bedside this morning.  Wife is present patient has been tolerating his tube feed well and a PEG tube.  He also has TPN running.  Patient did ask if he Anything oral, did check with general surgery he was okay to have ice chips and popsicles.    Assessment and Recommendations:    Small bowel obstruction: Improved-  noted on imaging and transverse duodenum with an additional  infection. No drainable fluid collection.   4.  Small volume ascites.   5.  Decreased conspicuity of pulmonary embolism in the right lower lobe.   6.  Improving airspace consolidations in the lower lobes representing evolving infectious/inflammatory process.   7.  No significant biliary duct dilation with biliary stent in place.      Electronically signed by Paul Hooker, APRN - CNP      NOTE:  This report was transcribed using voice recognition software.  Every effort was made to ensure accuracy; however, inadvertent computerized transcription errors may be present.

## 2025-01-09 NOTE — FLOWSHEET NOTE
01/08/25 2126   Assessment   Charting Type Shift assessment   Psychosocial   Psychosocial (WDL) WDL   Patient Behaviors Withdrawn   Neurological   Neuro (WDL) WDL   Level of Consciousness 0   Orientation Level Oriented X4   Cognition Appropriate judgement   Speech Clear   [place] Coma Scale   Eye Opening 4   Best Verbal Response 5   Best Motor Response 6   [place] Coma Scale Score 15   HEENT (Head, Ears, Eyes, Nose, & Throat)   HEENT (WDL) X   Right Eye Glasses   Left Eye Glasses   Right Ear Impaired hearing   Left Ear Impaired hearing   Respiratory   Respiratory (WDL) WDL   Respiratory Interventions H.O.B. elevated   Respiratory Pattern Regular   Respiratory Depth Normal   Respiratory Quality/Effort Unlabored   Chest Assessment Chest expansion symmetrical   L Breath Sounds Diminished   R Breath Sounds Diminished   Breath Sounds   Breath Sounds Bilateral Diminished   Right Upper Lobe Diminished   Right Middle Lobe Diminished   Right Lower Lobe Diminished   Left Upper Lobe Diminished   Left Lower Lobe Diminished   Cardiac   Cardiac (WDL) WDL   Cardiac Regularity Regular   Heart Sounds S1, S2   Cardiac Rhythm Sinus rhythm   Cardiac Monitor   Cardiac/Telemetry Monitor On Portable telemetry pack applied   Gastrointestinal   Abdominal (WDL) X   RUQ Bowel Sounds Hypoactive   LUQ Bowel Sounds Hypoactive   RLQ Bowel Sounds Hypoactive   LLQ Bowel Sounds Hypoactive   Abdomen Inspection Soft   Tenderness Tenderness   Gastrostomy/Enterostomy/Jejunostomy Tube Percutaneous Endoscopic Gastrostomy (PEG) LUQ 20 fr   Placement Date/Time: 01/08/25 1159   Inserted by: Dr. brian  Type: (c) Percutaneous Endoscopic Gastrostomy (PEG)  Location: LUQ  Tube Size (fr): 20 fr   Drainage Appearance None   Site Description Clean, dry & intact   G Port Status Infusing   Surrounding Skin Clean, dry & intact   Dressing Status Clean, dry & intact  (OTILIO)   Dressing Type Open to air   Tube Feeding Standard with Fiber   Tube feeding/verify rate

## 2025-01-09 NOTE — PROGRESS NOTES
Shift assessment completed, scheduled medication administered. Patient A&Ox4, VSS on RA. TF infusing at 35ml/hr, pt tolerating well, currently denies nausea or pain. Pt and spouse instructed to call out for needs. Fall precautions in place. Will continue to monitor for changes.

## 2025-01-10 LAB
ALBUMIN SERPL-MCNC: 2.6 G/DL (ref 3.4–5)
ALBUMIN/GLOB SERPL: 0.8 {RATIO}
ALP SERPL-CCNC: 47 U/L (ref 40–129)
ALT SERPL-CCNC: 17 U/L (ref 10–40)
ANION GAP SERPL CALCULATED.3IONS-SCNC: 9 MMOL/L (ref 3–16)
AST SERPL-CCNC: 22 U/L (ref 15–37)
BILIRUB DIRECT SERPL-MCNC: <0.2 MG/DL (ref 0–0.3)
BILIRUB INDIRECT SERPL-MCNC: ABNORMAL MG/DL (ref 0–1)
BILIRUB SERPL-MCNC: <0.2 MG/DL (ref 0–1)
BUN SERPL-MCNC: 15 MG/DL (ref 7–20)
CALCIUM SERPL-MCNC: 9.1 MG/DL (ref 8.3–10.6)
CHLORIDE SERPL-SCNC: 108 MMOL/L (ref 99–110)
CO2 SERPL-SCNC: 25 MMOL/L (ref 21–32)
CREAT SERPL-MCNC: 0.5 MG/DL (ref 0.8–1.3)
GFR, ESTIMATED: >90 ML/MIN/1.73M2
GLUCOSE BLD-MCNC: 106 MG/DL (ref 70–99)
GLUCOSE BLD-MCNC: 111 MG/DL (ref 70–99)
GLUCOSE BLD-MCNC: 112 MG/DL (ref 70–99)
GLUCOSE BLD-MCNC: 120 MG/DL (ref 70–99)
GLUCOSE BLD-MCNC: 132 MG/DL (ref 70–99)
GLUCOSE BLD-MCNC: 186 MG/DL (ref 70–99)
GLUCOSE SERPL-MCNC: 135 MG/DL (ref 70–99)
MAGNESIUM SERPL-MCNC: 2.1 MG/DL (ref 1.8–2.4)
PARTIAL THROMBOPLASTIN TIME: 68.2 SEC (ref 22.1–36.4)
PARTIAL THROMBOPLASTIN TIME: 81.8 SEC (ref 22.1–36.4)
PARTIAL THROMBOPLASTIN TIME: 84.1 SEC (ref 22.1–36.4)
PARTIAL THROMBOPLASTIN TIME: 93.9 SEC (ref 22.1–36.4)
PHOSPHATE SERPL-MCNC: 2.1 MG/DL (ref 2.5–4.9)
POTASSIUM SERPL-SCNC: 3.8 MMOL/L (ref 3.5–5.1)
PROT SERPL-MCNC: 5.9 G/DL (ref 6.4–8.2)
SODIUM SERPL-SCNC: 142 MMOL/L (ref 136–145)
TRIGL SERPL-MCNC: 59 MG/DL

## 2025-01-10 PROCEDURE — 6370000000 HC RX 637 (ALT 250 FOR IP): Performed by: NURSE PRACTITIONER

## 2025-01-10 PROCEDURE — 83735 ASSAY OF MAGNESIUM: CPT

## 2025-01-10 PROCEDURE — 82248 BILIRUBIN DIRECT: CPT

## 2025-01-10 PROCEDURE — 2500000003 HC RX 250 WO HCPCS: Performed by: NURSE PRACTITIONER

## 2025-01-10 PROCEDURE — 2060000000 HC ICU INTERMEDIATE R&B

## 2025-01-10 PROCEDURE — 2580000003 HC RX 258: Performed by: STUDENT IN AN ORGANIZED HEALTH CARE EDUCATION/TRAINING PROGRAM

## 2025-01-10 PROCEDURE — 2580000003 HC RX 258: Performed by: NURSE PRACTITIONER

## 2025-01-10 PROCEDURE — 2500000003 HC RX 250 WO HCPCS: Performed by: STUDENT IN AN ORGANIZED HEALTH CARE EDUCATION/TRAINING PROGRAM

## 2025-01-10 PROCEDURE — 6360000002 HC RX W HCPCS: Performed by: NURSE PRACTITIONER

## 2025-01-10 PROCEDURE — 6360000002 HC RX W HCPCS: Performed by: INTERNAL MEDICINE

## 2025-01-10 PROCEDURE — 2580000003 HC RX 258: Performed by: INTERNAL MEDICINE

## 2025-01-10 PROCEDURE — 6360000002 HC RX W HCPCS: Performed by: STUDENT IN AN ORGANIZED HEALTH CARE EDUCATION/TRAINING PROGRAM

## 2025-01-10 PROCEDURE — 85730 THROMBOPLASTIN TIME PARTIAL: CPT

## 2025-01-10 PROCEDURE — 6370000000 HC RX 637 (ALT 250 FOR IP): Performed by: SURGERY

## 2025-01-10 PROCEDURE — 2500000003 HC RX 250 WO HCPCS: Performed by: INTERNAL MEDICINE

## 2025-01-10 PROCEDURE — 82962 GLUCOSE BLOOD TEST: CPT

## 2025-01-10 PROCEDURE — 99232 SBSQ HOSP IP/OBS MODERATE 35: CPT | Performed by: SURGERY

## 2025-01-10 PROCEDURE — 36415 COLL VENOUS BLD VENIPUNCTURE: CPT

## 2025-01-10 PROCEDURE — 84100 ASSAY OF PHOSPHORUS: CPT

## 2025-01-10 PROCEDURE — 80053 COMPREHEN METABOLIC PANEL: CPT

## 2025-01-10 RX ADMIN — MEROPENEM 1000 MG: 1 INJECTION INTRAVENOUS at 09:24

## 2025-01-10 RX ADMIN — SODIUM CHLORIDE, PRESERVATIVE FREE 10 ML: 5 INJECTION INTRAVENOUS at 20:31

## 2025-01-10 RX ADMIN — SODIUM CHLORIDE, PRESERVATIVE FREE 10 ML: 5 INJECTION INTRAVENOUS at 09:25

## 2025-01-10 RX ADMIN — POTASSIUM PHOSPHATE, MONOBASIC POTASSIUM PHOSPHATE, DIBASIC 15 MMOL: 224; 236 INJECTION, SOLUTION, CONCENTRATE INTRAVENOUS at 16:29

## 2025-01-10 RX ADMIN — HYDROMORPHONE HYDROCHLORIDE 0.25 MG: 1 INJECTION, SOLUTION INTRAMUSCULAR; INTRAVENOUS; SUBCUTANEOUS at 20:42

## 2025-01-10 RX ADMIN — HYDROMORPHONE HYDROCHLORIDE 0.25 MG: 1 INJECTION, SOLUTION INTRAMUSCULAR; INTRAVENOUS; SUBCUTANEOUS at 07:43

## 2025-01-10 RX ADMIN — SODIUM CHLORIDE, PRESERVATIVE FREE 10 ML: 5 INJECTION INTRAVENOUS at 20:32

## 2025-01-10 RX ADMIN — INSULIN LISPRO 1 UNITS: 100 INJECTION, SOLUTION INTRAVENOUS; SUBCUTANEOUS at 09:27

## 2025-01-10 RX ADMIN — POLYETHYLENE GLYCOL 3350 17 G: 17 POWDER, FOR SOLUTION ORAL at 09:28

## 2025-01-10 RX ADMIN — Medication 40 MG: at 09:24

## 2025-01-10 RX ADMIN — MEROPENEM 1000 MG: 1 INJECTION INTRAVENOUS at 20:30

## 2025-01-10 RX ADMIN — HEPARIN SODIUM 22 UNITS/KG/HR: 10000 INJECTION, SOLUTION INTRAVENOUS at 20:42

## 2025-01-10 RX ADMIN — HEPARIN SODIUM 20 UNITS/KG/HR: 10000 INJECTION, SOLUTION INTRAVENOUS at 03:18

## 2025-01-10 RX ADMIN — HEPARIN SODIUM 2000 UNITS: 1000 INJECTION INTRAVENOUS; SUBCUTANEOUS at 09:32

## 2025-01-10 ASSESSMENT — PAIN SCALES - WONG BAKER: WONGBAKER_NUMERICALRESPONSE: NO HURT

## 2025-01-10 ASSESSMENT — PAIN DESCRIPTION - DESCRIPTORS
DESCRIPTORS: DISCOMFORT
DESCRIPTORS: SHARP

## 2025-01-10 ASSESSMENT — PAIN SCALES - GENERAL
PAINLEVEL_OUTOF10: 3
PAINLEVEL_OUTOF10: 0
PAINLEVEL_OUTOF10: 4
PAINLEVEL_OUTOF10: 0

## 2025-01-10 ASSESSMENT — PAIN DESCRIPTION - ORIENTATION
ORIENTATION: MID
ORIENTATION: LEFT

## 2025-01-10 ASSESSMENT — PAIN - FUNCTIONAL ASSESSMENT
PAIN_FUNCTIONAL_ASSESSMENT: ACTIVITIES ARE NOT PREVENTED
PAIN_FUNCTIONAL_ASSESSMENT: ACTIVITIES ARE NOT PREVENTED

## 2025-01-10 ASSESSMENT — PAIN DESCRIPTION - LOCATION
LOCATION: ABDOMEN
LOCATION: ABDOMEN

## 2025-01-10 NOTE — PROGRESS NOTES
Hospitalist Progress Note      Name:  Edward Barlow /Age/Sex: 1950  (74 y.o. male)   MRN & CSN:  [id number] & [id number] Encounter Date/Time: 1/10/2025 2:02 PM EST    Location:  [id number] PCP: Ellis Ordonez MD       Hospital Day: 7         Date of Admission: 2025    Chief Complaint: Nausea and vomiting    Hospital Course: Edward Barlow is a 74 y.o. male with past medical history of recently diagnosed pulmonary embolism on Eliquis, chronic pancreatitis status post NG tube placement, recent community-acquired pneumonia class III malnutrition, celiac disease, hypertension, hyperlipidemia, history of TIA and bladder cancer.  He presented to the emergency department on account of nausea and vomiting of 1 day duration.  He had an NG tube placed 2024 but this was discontinued yesterday -- it was blocked after his diet has been expanded under GI oversight.  Patient denies any other symptoms.       CT of the abdomen pelvis showed some improvement in pancreatic findings but an increase in gastric distention and soft tissue thickening suspicious for SBO in the transverse duodenum, another small bowel dilation and transition point in right abdomen concerning for an additional small bowel obstruction. No significant biliary duct dilatation with biliary stent in place. There is also less conspicuous RLL pulm embolism, with improvement bilateral lower lobe consolidations.         EGD-PEG tube placement 2025 by GI  EGD-gastritis, normal duodenum, placement of externally removable PEG tube.     Subjective: Patient seen and examined at bedside.  Patient spouse is present.  Patient is tolerating tube feed through PEG tube.  Patient denies any abdominal pain, nausea or vomiting.  Abdomen soft and nondistended.  Has been tolerating clear liquids and popsicles.    Assessment and Recommendations:    Small bowel obstruction: Improved-  noted on imaging and transverse duodenum with an additional transition point

## 2025-01-10 NOTE — PROGRESS NOTES
Comprehensive Nutrition Assessment    Type and Reason for Visit:  Reassess    Nutrition Recommendations/Plan:   Continue NPO- ADAT per GI.  Continue TF, Jevity 1.5 @ 65 mL/hr. Flush 30 q 6 hrs.  Wean TPN, cut rate in half and let bag run out.  Monitor for BM.  Obtain standing weight.     Malnutrition Assessment:  Malnutrition Status:  Severe malnutrition (01/06/25 1008)    Context:  Chronic Illness     Findings of the 6 clinical characteristics of malnutrition:  Energy Intake:  75% or less estimated energy requirements for 1 month or longer  Weight Loss:  5% over 1 month     Body Fat Loss:  Unable to assess     Muscle Mass Loss:  Unable to assess    Fluid Accumulation:  No fluid accumulation     Strength:  Not Performed    Nutrition Assessment:    Follow-up. Pt TF advanced to goal rate this AM at 730. TPN running @ 83 mL/hr. Pt tolerating all nutrition well. Can wean TPN from nutrition standpoint and continue current TF. Would like to see pt tolerate current TF rate for at least 24 hrs and have a good bowel movement before D/C. Awaiting GI to see pt to advance oral diet as tolerated. Pt reports no abdominal distention or nausea. Will continue current nutrition interventions. Please obtain standing wt to assess wt trend while on TF.    Nutrition Related Findings:    BM 1/9, small BM; Glu 186; Wound Type: Pressure Injury (buttock)       Current Nutrition Intake & Therapies:    Average Meal Intake: NPO  Average Supplements Intake: NPO  Diet NPO Exceptions are: Ice Chips, Popsicles  ADULT TUBE FEEDING; PEG; Standard with Fiber; Continuous; 5; Yes; 15; Q 12 hours; 65; 30; Q 6 hours  PN-Adult Premix 5/20 - Central  Current Tube Feeding (TF) Orders:  Feeding Route: PEG  Formula: Standard with Fiber  Schedule: Continuous  Feeding Regimen: @ 65 ml/hr  Additives/Modulars: None  Water Flushes: 30 q 6 hrs  Current TF Provides: 1300 mL TV, 1950 kcal, 83gm pro, 988 mL free water  Current Parenteral Nutrition Orders:  Type and

## 2025-01-10 NOTE — PROGRESS NOTES
Progress Note    Patient Edward Barlow  MRN: [id number]  YOB: 1950 Age: 74 y.o. Sex: male  Room: [address] [place] [address]       Admitting Physician: Faith Dominguez MD   Date of Admission: 1/4/2025  5:16 PM   Primary Care Physician: Ellis Ordonez MD     Subjective:  Edward Barlow was seen and examined. We are following for severe pancreatitis.  -- Patient with necrotic pancreatitis with infected phlegmon.    ROS:  Constitutional: Denies fever, no change in appetite  Respiratory: Denies cough or shortness of breath  Cardiovascular: Denies chest pain or edema    Objective:  Vital Signs:   Vitals:    01/09/25 2001   BP: 121/80   Pulse: 69   Resp: 16   Temp: 97.7 °F (36.5 °C)   SpO2: 98%         Physical Exam:  Constitutional: Alert and oriented x 4. No acute distress.   HEENT: Sclera anicteric, mucosal membranes moist  Cardiovascular: Regular rate and rhythm.  No murmurs.  Respiratory: Respirations nonlabored, no crepitus  GI: Abdomen nondistended, soft, and nontender.  Normal active bowel sounds.  No masses palpable.   Rectal: Deferred  Musculoskeletal:  No pitting edema of the lower legs.  Neurological: Gross memory appears intact.   Intake/Output:    Intake/Output Summary (Last 24 hours) at 1/9/2025 2017  Last data filed at 1/9/2025 0806  Gross per 24 hour   Intake 231 ml   Output 800 ml   Net -569 ml        Current Medications:  Current Facility-Administered Medications   Medication Dose Route Frequency Provider Last Rate Last Admin    polyethylene glycol (GLYCOLAX) packet 17 g  17 g Oral Daily Dimitry Garcia MD   17 g at 01/09/25 1009    potassium phosphate 15 mmol in sodium chloride 0.9 % 250 mL IVPB  15 mmol IntraVENous Once Ashley Hooker APRN - CNP 62.5 mL/hr at 01/09/25 1729 15 mmol at 01/09/25 1729    PN-Adult Premix 5/20 - Central   IntraVENous Continuous TPN Ashley Hooker APRN - CNP 83 mL/hr at 01/09/25 1800 New Bag at 01/09/25 1800    insulin lispro (HUMALOG,ADMELOG) injection vial 0-4 Units

## 2025-01-10 NOTE — PROGRESS NOTES
General Surgery Progress Note                 PATIENT NAME: Edward Barlow   Medical Record Number: [id number]  YOB: 1950       TODAY'S DATE: 1/10/2025      Chief Complaint   Patient presents with    Nausea    Vomiting         SUBJECTIVE:    Pt is doing well.  Tolerating goal tube feeds.  + Flatus and BM yesterday.  Denies nausea, bloating       OBJECTIVE:   VITALS:  /78   Pulse 71   Temp 97.5 °F (36.4 °C) (Oral)   Resp 16   Ht 1.778 m (5' 10\")   Wt 64.9 kg (143 lb 1.3 oz)   SpO2 96%   BMI 20.53 kg/m²       INTAKE/OUTPUT:    I/O last 3 completed shifts:  In: 1108 [NG/GT:1108]  Out: 1400 [Urine:1400]  I/O this shift:  In: -   Out: 900 [Urine:900]              GENERAL: alert, no distress    LUNGS: Normal rate and effort  HEART: normal rate and regular rhythm  ABDOMEN: soft, non-tender and non-distended  EXTREMITY: no cyanosis, clubbing or edema      DATA:  CBC:   Recent Labs     01/09/25  0729   WBC 7.9   HGB 11.0*   HCT 32.9*        BMP:    Recent Labs     01/09/25  0729 01/10/25  0817    142   K 3.1* 3.8    108   CO2 25 25   BUN 10 15   CREATININE 0.6* 0.5*   GLUCOSE 152* 135*     Hepatic:   Recent Labs     01/10/25  0817   AST 22   ALT 17   BILITOT <0.2   ALKPHOS 47     Mag:      Recent Labs     01/09/25  0729 01/10/25  0817   MG 2.0 2.1      Phos:     Recent Labs     01/09/25  0729 01/10/25  0817   PHOS 2.1* 2.1*      INR:   No results for input(s): \"INR\" in the last 72 hours.        Radiology Review: KUB with improving bowel dilatation      Assessment  Edward Barlow is a 74 y.o. male admitted for chronic pancreatitis, SBO.  Abdomen remains soft without tenderness      Plan    1.  SBO/chronic pancreatitis with partial WHALEY  Discussed with Dr. Dias.  No evidence of gastric outlet obstruction on EGD  Ileus appears to be resolving as he is tolerating goal tube feeds and had a BM yesterday  Wean TPN at discretion of GI team.  No acute surgical needs, will follow

## 2025-01-10 NOTE — PROGRESS NOTES
Progress Note    Patient Edward Barlow  MRN: [id number]  YOB: 1950 Age: 74 y.o. Sex: male  Room: [address] [place] [address]       Admitting Physician: Faith Dominguez MD   Date of Admission: 1/4/2025  5:16 PM   Primary Care Physician: Ellis Ordonez MD     Subjective:  Edward Barlow was seen and examined. We are following for severe pancreatitis.  -- Patient with necrotic pancreatitis with infected phlegmon.  Tolerating tube feeds.    ROS:  Constitutional: Denies fever, no change in appetite  Respiratory: Denies cough or shortness of breath  Cardiovascular: Denies chest pain or edema    Objective:  Vital Signs:   Vitals:    01/10/25 1554   BP: 113/72   Pulse: 73   Resp: 16   Temp: 98.2 °F (36.8 °C)   SpO2: 98%         Physical Exam:  Constitutional: Alert and oriented x 4. No acute distress.   HEENT: Sclera anicteric, mucosal membranes moist  Cardiovascular: Regular rate and rhythm.  No murmurs.  Respiratory: Respirations nonlabored, no crepitus  GI: Abdomen nondistended, soft, and nontender.  Normal active bowel sounds.  No masses palpable.   Rectal: Deferred  Musculoskeletal:  No pitting edema of the lower legs.  Neurological: Gross memory appears intact.   Intake/Output:    Intake/Output Summary (Last 24 hours) at 1/10/2025 1653  Last data filed at 1/10/2025 1152  Gross per 24 hour   Intake 997 ml   Output 1600 ml   Net -603 ml        Current Medications:  Current Facility-Administered Medications   Medication Dose Route Frequency Provider Last Rate Last Admin    potassium phosphate 15 mmol in sodium chloride 0.9 % 250 mL IVPB  15 mmol IntraVENous Once Ashley Hooker APRN - CNP 62.5 mL/hr at 01/10/25 1629 15 mmol at 01/10/25 1629    polyethylene glycol (GLYCOLAX) packet 17 g  17 g Oral Daily Dimitry Garcia MD   17 g at 01/10/25 0928    PN-Adult Premix 5/20 - Central   IntraVENous Continuous TPN Ashley Hooker APRN - CNP 42 mL/hr at 01/10/25 1431 Rate Change at 01/10/25 1431    insulin lispro  removed, and the gastrostomy tube was pulled            out from the stomach through the skin.  The guide wire was removed, and the            external bumper attached to the gastrostomy tube.  The feeding tube was then            cut to an appropriate length.  The final position of the gastrostomy tube was            confirmed by relook endoscopy, and skin marking noted to be 3 cm at the            external bumper.  The final tension and compression of the abdominal wall by            the PEG tube and external bumper were checked and revealed that the bumper was            loose and lightly touching the skin.  The feeding tube was capped, and the tube            site was cleaned and dressed.  Impression:         -  LA Grade C reflux esophagitis with no bleeding.         -  Gastritis, characterized by erythema.         -  Normal examined duodenum.         -  Gastritis.         -  An externally removable PEG placement was successfully completed.         -  No specimens collected.  Recommendation:         -  NPO for 6 hours.         -  Continue present medications.         -  The patient will be observed post-procedure, until all discharge criteria            are met.  Procedure Code(s):         - 68272, Esophagogastroduodenoscopy, flexible, transoral; with directed            placement of percutaneous gastrostomy tube  Diagnosis Code(s):         - K21.00, Gastro-esophageal reflux disease with esophagitis, without bleeding         - K29.70, Gastritis, unspecified, without bleeding        CPT(R) - 2023 copyright [employer]. All Rights Reserved.        The CPT codes, CCI edits and ICD codes generated are intended as suggestions        and were generated based on input data.  These codes are preliminary and upon         review may be revised to meet current compliance and payer requirements.        The provider is responsible for the final determination of appropriate codes,        and

## 2025-01-10 NOTE — CONSULTS
[facility]    Pharmacy Progress Note    TPN - Management by Pharmacy    Consult Date(s): 1/8/25  Consulting Provider(s): Ashley Hooker CNP    Assessment / Plan  Parenteral Nutrition  Day of Therapy: 3   IV access: PICC  Formulation:  Clinimix 5/20  Clinimix Rate: Goal rate 83 ml/hr (1992 ml)  Lipids:  Hold at this time due to pancreatitis  Appreciate Clinical Dietitian's recommendations for formulation and goal rate.  Patient tolerating TF at 65 ml/hr-goal   Wean TPN to 40ml/hr then stop this evening at 1800-discussed with RN  GI/dietician notified of TPN plan   Electrolytes:  Due to backorder of Clinimix E, pharmacy will compound custom electrolyte formula. Further electrolyte management per pharmacy.    Phosphorus 2.1 mg/L-replace with 15 mmol Kphos IVPB. Additional Phosphorus and Potassium added to TPN.   Maintenance IVF:  none  Glucose control: no history of DM. Continue q6h accuchecks and low ssi through night and dc tomorrow am.   Add MVI 10 mL/bag in PN on Mon-Wed-Fri only (due to national shortage) & Trace Elements 1 mL/bag in PN daily.  Daily renal panel, daily magnesium, and weekly triglycerides ordered per protocol.  PN will be re-ordered daily.    Thank you for consulting pharmacy,    Srikanth Kennedy PharmD, BCPS        Subjective/Objective:   Edward Barlow is a 74 y.o. male with a PMHx significant for recently diagnosed pulmonary embolism on Eliquis, chronic pancreatitis  who is admitted with partial SBO.     Pharmacy is consulted to dose TPN.    Ht Readings from Last 1 Encounters:   01/10/25 1.778 m (5' 10\")     Wt Readings from Last 1 Encounters:   01/05/25 64.9 kg (143 lb 1.3 oz)     Recent Labs     01/09/25  0729 01/10/25  0817    142   K 3.1* 3.8    108   CO2 25 25   PHOS 2.1* 2.1*   GLUCOSE 152* 135*   BUN 10 15   CREATININE 0.6* 0.5*   CALCIUM 9.1 9.1   MG 2.0 2.1     Albumin   Date Value Ref Range Status   01/10/2025 2.6 (L) 3.4 - 5.0 g/dL Final       Recent  Labs     01/09/25  2008 01/10/25  0151 01/10/25  0917 01/10/25  1224   POCGLU 132* 132* 186* 111*     Sliding scale insulin used since PN bag hung yesterday: none    Recent Labs     01/09/25  0729   WBC 7.9   HGB 11.0*        Triglycerides   Date Value Ref Range Status   01/09/2025 59 <150 mg/dL Final   12/28/2024 115 <150 mg/dL Final   11/19/2024 72 0 - 149 mg/dL Final   11/05/2015 81 0 - 150 mg/dL Final

## 2025-01-10 NOTE — PLAN OF CARE
Problem: Discharge Planning  Goal: Discharge to home or other facility with appropriate resources  1/9/2025 2131 by Lisa Ludwig, RN  Outcome: Progressing

## 2025-01-10 NOTE — FLOWSHEET NOTE
01/09/25 2100   Assessment   Charting Type Shift assessment   Psychosocial   Psychosocial (WDL) X   Patient Behaviors Flat affect;Not interactive   Neurological   Neuro (WDL) WDL   Level of Consciousness 0   Orientation Level Oriented X4   Cognition Appropriate judgement   Speech Clear   [place] Coma Scale   Eye Opening 4   Best Verbal Response 5   Best Motor Response 6   [place] Coma Scale Score 15   HEENT (Head, Ears, Eyes, Nose, & Throat)   HEENT (WDL) X   Right Eye Glasses   Left Eye Glasses   Right Ear Impaired hearing   Left Ear Impaired hearing   Respiratory   Respiratory (WDL) WDL   Respiratory Pattern Regular   Respiratory Depth Normal   Respiratory Quality/Effort Unlabored   Chest Assessment Chest expansion symmetrical   L Breath Sounds Diminished   R Breath Sounds Diminished   Cardiac   Cardiac (WDL) WDL   Cardiac Regularity Regular   Heart Sounds S1, S2   Cardiac Rhythm Sinus rhythm   Gastrointestinal   Abdominal (WDL) X   Pre Hospital Interventions Stool softener   RUQ Bowel Sounds Hypoactive   LUQ Bowel Sounds Hypoactive   RLQ Bowel Sounds Hypoactive   LLQ Bowel Sounds Hypoactive   Abdomen Inspection Soft;Ostomy tube   GI Symptoms Loss of appetite;Constipation;Distention   Tenderness Tenderness   Gastrostomy/Enterostomy/Jejunostomy Tube Percutaneous Endoscopic Gastrostomy (PEG) LUQ 20 fr   Placement Date/Time: 01/08/25 1159   Inserted by: Dr. brian  Type: (c) Percutaneous Endoscopic Gastrostomy (PEG)  Location: LUQ  Tube Size (fr): 20 fr   Site Description Clean, dry & intact   G Port Status Infusing   Surrounding Skin Clean, dry & intact   Dressing Type Open to air   Tube Feeding Standard with Fiber   Tube feeding/verify rate (mL/hr) 50 mL/hr   Tube Feeding Intake (mL) 538 ml   Free Water/Flush (mL) 60 mL   Genitourinary   Genitourinary (WDL) WDL   Suprapubic Tenderness No   Dysuria (Pain/Burning w/Urination) No   Peripheral Vascular   Peripheral Vascular (WDL) WDL   Edema None   Anti-Embolism    Anti-Embolism Devices Other (Comment)   Anti-Embolism Intervention Medication   Skin Integumentary    Skin Integumentary (WDL) X   Wound 01/04/25 Buttocks   Date First Assessed/Time First Assessed: 01/04/25 2035   Present on Original Admission: Yes  Location: Buttocks   Wound Cleansed Not Cleansed   Dressing/Treatment Foam   Dressing Change Due 01/09/25   Drainage Amount None (dry)   Odor None   Wound 01/07/25 Buttocks Left;Upper dry, pink   Date First Assessed/Time First Assessed: 01/07/25 1504   Present on Original Admission: Yes  Wound Approximate Age at First Assessment (Weeks): (c)   Primary Wound Type: (c) Other (comment)  Location: Buttocks  Wound Location Orientation: Left;Upper  ...   Wound Cleansed Not Cleansed   Dressing/Treatment Hydrating gel;Foam   Drainage Amount None (dry)   Odor None

## 2025-01-10 NOTE — PROGRESS NOTES
Infectious Diseases Inpatient Progress Note      CHIEF COMPLAINT:     Sepsis  WBC elevation  Nausea  Vomiting  Acute on chronic pancreatitis  SBO  Ileus  History of pulm embolism    HISTORY OF PRESENT ILLNESS:  74 y.o. male with a significant history for bladder cancer, celiac disease, hypertension, hyperlipidemia, TIA history of recurrent pancreatitis patient was recently admitted to [facility] from 12/7/24 to 12/18/24 and prolonged hospitalization complicated course was evaluated by gastroenterology service patient required NG tube placement, he also underwent esophagogastroduodenoscopy noted to have fluid residual in stomach during esophagogastroduodenoscopy.  Per chart review patient underwent ERCP by  on 10/23/24 noted to have biliary obstruction enterotomy was performed biliary stent was placed.  Patient underwent EUS on 11/20/24 noted to have pancreatic parenchymal abnormality consistent with hyperechoic strands large amount of food residue in the stomach requiring suctioning abnormalities is consistent with cyst .  Patient underwent repeat  esophagogastroduodenoscopy on recent admission at [facility] on 12/9/24, requiring G-tube feeding to allowing pancreas to rest.  Patient was recently readmitted to [facility] on 12/26/24 noted to have acute right-sided pulm embolism on the CTA required anticoagulation he was discharged on 12/28/24 and was placed on Eliquis for chronic anticoagulation.  He is now admitted to [facility] secondary to abdominal pain nausea vomiting, current labs indicate creatinine 1.4 lactic acid 3 WBC 33.8 hemoglobin 11.4 blood cultures in process urine culture negative CT abdomen pelvis IV contrast on this admission indicated gastro antral thickening associated poor definition of pancreatic head and proximal body representing pancreatitis biliary stent in satisfactory position.  Given the ongoing WBC elevation with concern for secondary  vomiting, unspecified vomiting type  R11.2       3. Intestinal obstruction, unspecified cause, unspecified whether partial or complete ([facility])  K56.609       4. Dehydration  E86.0       5. SHAJI (acute kidney injury) ([facility])  N17.9       6. Lactic acidosis  E87.20          Admitted with nausea vomiting  Abdominal pain  Acute on chronic pancreatitis  Complicated course secondary to ongoing chronic pancreatitis multiple hospitalizations since October  Status post esophagogastroduodenoscopy and ERCP with biliary stent placement in the past  Status post biliary sweeping and sphincterotomy pathology negative for malignancy  History of recent pulm embolism now chronic anticoagulation with Eliquis  History of bladder cancer  History of celiac disease  Malnutrition BMI 20  Sepsis  WBC elevation  Lactic acidosis  SHAJI  CT of the pelvis on this admission indicating gastric antral thickening with enteritis and associated changes in the pancreas consistent with pancreatitis-but no acutely drainable pancreatic fluid collection noted with new foci of extraluminal peripancreatic gas and biliary stent in place      Unfortunately patient has been dealing with acute on chronic pancreatitis since October multiple hospitalization multiple procedures including biliary stent placement he was on NG tube feeding with bowel rest for developed pseudocyst and WBC  trending down was discharged without any antibiotic as he clinically improved but now unfortunately admitted with with worsening GI symptoms and WBC elevation lactic acidosis concern for secondary infectious process blood cultures negative cytology and biliary sweep pathology was negative for any malignancy he does have a history of celiac disease.      Status post PEG tube placement for nutrition  now weaned off  TPN through PICC line.  WBC normalized blood cultures negative will be able to discontinue antibiotic once GI tract improved     Had high residuals on the Tube feeds not at goal

## 2025-01-10 NOTE — CARE COORDINATION
Discharge Planning Note:    Per IDR pt has picc line, peg tube and currently receiving TPN. Plan is HHC at d/c. Wife o transport.

## 2025-01-11 LAB
ANION GAP SERPL CALCULATED.3IONS-SCNC: 8 MMOL/L (ref 3–16)
BASOPHILS # BLD: 0.04 K/UL (ref 0–0.2)
BASOPHILS NFR BLD: 1 %
BUN SERPL-MCNC: 13 MG/DL (ref 7–20)
CALCIUM SERPL-MCNC: 9.1 MG/DL (ref 8.3–10.6)
CHLORIDE SERPL-SCNC: 107 MMOL/L (ref 99–110)
CO2 SERPL-SCNC: 25 MMOL/L (ref 21–32)
CREAT SERPL-MCNC: 0.5 MG/DL (ref 0.8–1.3)
EOSINOPHIL # BLD: 0.25 K/UL (ref 0–0.6)
EOSINOPHILS RELATIVE PERCENT: 3 %
ERYTHROCYTE [DISTWIDTH] IN BLOOD BY AUTOMATED COUNT: 16.4 % (ref 12.4–15.4)
GFR, ESTIMATED: >90 ML/MIN/1.73M2
GLUCOSE BLD-MCNC: 101 MG/DL (ref 70–99)
GLUCOSE BLD-MCNC: 102 MG/DL (ref 70–99)
GLUCOSE BLD-MCNC: 104 MG/DL (ref 70–99)
GLUCOSE BLD-MCNC: 92 MG/DL (ref 70–99)
GLUCOSE SERPL-MCNC: 117 MG/DL (ref 70–99)
HCT VFR BLD AUTO: 32.6 % (ref 40.5–52.5)
HGB BLD-MCNC: 10.2 G/DL (ref 13.5–17.5)
IMM GRANULOCYTES # BLD AUTO: 0.02 K/UL (ref 0–0.5)
IMM GRANULOCYTES NFR BLD: 0 %
LYMPHOCYTES NFR BLD: 2.7 K/UL (ref 1–5.1)
LYMPHOCYTES RELATIVE PERCENT: 34 %
MAGNESIUM SERPL-MCNC: 2.2 MG/DL (ref 1.8–2.4)
MCH RBC QN AUTO: 28.2 PG (ref 26–34)
MCHC RBC AUTO-ENTMCNC: 31.3 G/DL (ref 31–36)
MCV RBC AUTO: 90.1 FL (ref 80–100)
MONOCYTES NFR BLD: 0.41 K/UL (ref 0–1.3)
MONOCYTES NFR BLD: 5 %
NEUTROPHILS NFR BLD: 57 %
NEUTS SEG NFR BLD: 4.58 K/UL (ref 1.7–7.7)
PARTIAL THROMBOPLASTIN TIME: 85.6 SEC (ref 22.1–36.4)
PARTIAL THROMBOPLASTIN TIME: 95.7 SEC (ref 22.1–36.4)
PHOSPHATE SERPL-MCNC: 2.1 MG/DL (ref 2.5–4.9)
PLATELET # BLD AUTO: 157 K/UL (ref 135–450)
PMV BLD AUTO: 11.4 FL
POTASSIUM SERPL-SCNC: 4 MMOL/L (ref 3.5–5.1)
RBC # BLD AUTO: 3.62 M/UL (ref 4.2–5.9)
SODIUM SERPL-SCNC: 140 MMOL/L (ref 136–145)
WBC OTHER # BLD: 8 K/UL (ref 4–11)

## 2025-01-11 PROCEDURE — 80048 BASIC METABOLIC PNL TOTAL CA: CPT

## 2025-01-11 PROCEDURE — 83735 ASSAY OF MAGNESIUM: CPT

## 2025-01-11 PROCEDURE — 97161 PT EVAL LOW COMPLEX 20 MIN: CPT

## 2025-01-11 PROCEDURE — 6370000000 HC RX 637 (ALT 250 FOR IP): Performed by: NURSE PRACTITIONER

## 2025-01-11 PROCEDURE — 2580000003 HC RX 258: Performed by: INTERNAL MEDICINE

## 2025-01-11 PROCEDURE — 2500000003 HC RX 250 WO HCPCS: Performed by: STUDENT IN AN ORGANIZED HEALTH CARE EDUCATION/TRAINING PROGRAM

## 2025-01-11 PROCEDURE — 99233 SBSQ HOSP IP/OBS HIGH 50: CPT | Performed by: INTERNAL MEDICINE

## 2025-01-11 PROCEDURE — 85025 COMPLETE CBC W/AUTO DIFF WBC: CPT

## 2025-01-11 PROCEDURE — 6360000002 HC RX W HCPCS: Performed by: INTERNAL MEDICINE

## 2025-01-11 PROCEDURE — 2580000003 HC RX 258: Performed by: STUDENT IN AN ORGANIZED HEALTH CARE EDUCATION/TRAINING PROGRAM

## 2025-01-11 PROCEDURE — 2060000000 HC ICU INTERMEDIATE R&B

## 2025-01-11 PROCEDURE — 2500000003 HC RX 250 WO HCPCS: Performed by: INTERNAL MEDICINE

## 2025-01-11 PROCEDURE — 97165 OT EVAL LOW COMPLEX 30 MIN: CPT

## 2025-01-11 PROCEDURE — 6360000002 HC RX W HCPCS: Performed by: STUDENT IN AN ORGANIZED HEALTH CARE EDUCATION/TRAINING PROGRAM

## 2025-01-11 PROCEDURE — 6370000000 HC RX 637 (ALT 250 FOR IP): Performed by: SURGERY

## 2025-01-11 PROCEDURE — 85730 THROMBOPLASTIN TIME PARTIAL: CPT

## 2025-01-11 PROCEDURE — 82962 GLUCOSE BLOOD TEST: CPT

## 2025-01-11 PROCEDURE — 36415 COLL VENOUS BLD VENIPUNCTURE: CPT

## 2025-01-11 PROCEDURE — 84100 ASSAY OF PHOSPHORUS: CPT

## 2025-01-11 PROCEDURE — 6360000002 HC RX W HCPCS: Performed by: NURSE PRACTITIONER

## 2025-01-11 RX ORDER — BISACODYL 10 MG
10 SUPPOSITORY, RECTAL RECTAL DAILY
Status: DISCONTINUED | OUTPATIENT
Start: 2025-01-11 | End: 2025-01-15

## 2025-01-11 RX ADMIN — POLYETHYLENE GLYCOL 3350 17 G: 17 POWDER, FOR SOLUTION ORAL at 09:24

## 2025-01-11 RX ADMIN — HYDROMORPHONE HYDROCHLORIDE 0.25 MG: 1 INJECTION, SOLUTION INTRAMUSCULAR; INTRAVENOUS; SUBCUTANEOUS at 05:49

## 2025-01-11 RX ADMIN — MEROPENEM 1000 MG: 1 INJECTION INTRAVENOUS at 04:41

## 2025-01-11 RX ADMIN — SODIUM CHLORIDE, PRESERVATIVE FREE 10 ML: 5 INJECTION INTRAVENOUS at 19:47

## 2025-01-11 RX ADMIN — MEROPENEM 1000 MG: 1 INJECTION INTRAVENOUS at 19:47

## 2025-01-11 RX ADMIN — SODIUM CHLORIDE, PRESERVATIVE FREE 10 ML: 5 INJECTION INTRAVENOUS at 19:49

## 2025-01-11 RX ADMIN — MEROPENEM 1000 MG: 1 INJECTION INTRAVENOUS at 12:15

## 2025-01-11 RX ADMIN — HEPARIN SODIUM 22 UNITS/KG/HR: 10000 INJECTION, SOLUTION INTRAVENOUS at 12:20

## 2025-01-11 RX ADMIN — Medication 40 MG: at 09:24

## 2025-01-11 RX ADMIN — SODIUM CHLORIDE, PRESERVATIVE FREE 10 ML: 5 INJECTION INTRAVENOUS at 09:24

## 2025-01-11 RX ADMIN — BISACODYL 10 MG: 10 SUPPOSITORY RECTAL at 12:09

## 2025-01-11 ASSESSMENT — PAIN DESCRIPTION - ORIENTATION: ORIENTATION: LEFT

## 2025-01-11 ASSESSMENT — PAIN SCALES - GENERAL
PAINLEVEL_OUTOF10: 0
PAINLEVEL_OUTOF10: 4

## 2025-01-11 ASSESSMENT — PAIN DESCRIPTION - DESCRIPTORS: DESCRIPTORS: SHARP

## 2025-01-11 ASSESSMENT — PAIN - FUNCTIONAL ASSESSMENT: PAIN_FUNCTIONAL_ASSESSMENT: ACTIVITIES ARE NOT PREVENTED

## 2025-01-11 ASSESSMENT — PAIN DESCRIPTION - LOCATION: LOCATION: ABDOMEN

## 2025-01-11 NOTE — PLAN OF CARE
Problem: Discharge Planning  Goal: Discharge to home or other facility with appropriate resources  1/11/2025 0956 by Richelle Yanes RN  Outcome: Progressing  Flowsheets (Taken 1/11/2025 0956)  Discharge to home or other facility with appropriate resources:   Identify barriers to discharge with patient and caregiver   Identify discharge learning needs (meds, wound care, etc)   Arrange for needed discharge resources and transportation as appropriate  1/10/2025 2159 by Louann Epps RN  Outcome: Progressing     Problem: Skin/Tissue Integrity  Goal: Absence of new skin breakdown  Description: 1.  Monitor for areas of redness and/or skin breakdown  2.  Assess vascular access sites hourly  3.  Every 4-6 hours minimum:  Change oxygen saturation probe site  4.  Every 4-6 hours:  If on nasal continuous positive airway pressure, respiratory therapy assess nares and determine need for appliance change or resting period.  1/11/2025 0956 by Richelle Yanes RN  Outcome: Progressing  1/10/2025 2159 by Louann Epps RN  Outcome: Progressing     Problem: ABCDS Injury Assessment  Goal: Absence of physical injury  1/11/2025 0956 by Richelle Yanes RN  Outcome: Progressing  1/10/2025 2159 by Louann Epps RN  Outcome: Progressing     Problem: Safety - Adult  Goal: Free from fall injury  1/11/2025 0956 by Richelle Yanes RN  Outcome: Progressing  1/10/2025 2159 by Louann Epps RN  Outcome: Progressing     Problem: Pain  Goal: Verbalizes/displays adequate comfort level or baseline comfort level  1/11/2025 0956 by Richelle Yanes RN  Outcome: Progressing  1/10/2025 2159 by Louann Epps RN  Outcome: Progressing     Problem: Nutrition Deficit:  Goal: Optimize nutritional status  1/11/2025 0956 by Richelle Yanes RN  Outcome: Progressing  1/10/2025 2159 by Louann Epps RN  Outcome: Progressing

## 2025-01-11 NOTE — PLAN OF CARE
Problem: Discharge Planning  Goal: Discharge to home or other facility with appropriate resources  Outcome: Progressing     Problem: Skin/Tissue Integrity  Goal: Absence of new skin breakdown  Description: 1.  Monitor for areas of redness and/or skin breakdown  2.  Assess vascular access sites hourly  3.  Every 4-6 hours minimum:  Change oxygen saturation probe site  4.  Every 4-6 hours:  If on nasal continuous positive airway pressure, respiratory therapy assess nares and determine need for appliance change or resting period.  Outcome: Progressing     Problem: ABCDS Injury Assessment  Goal: Absence of physical injury  Outcome: Progressing     Problem: Safety - Adult  Goal: Free from fall injury  1/10/2025 2159 by Louann Epps RN  Outcome: Progressing  1/10/2025 1150 by Samira Smith RN  Outcome: Progressing     Problem: Pain  Goal: Verbalizes/displays adequate comfort level or baseline comfort level  1/10/2025 2159 by Louann Epps RN  Outcome: Progressing  1/10/2025 1150 by Samira Smith RN  Outcome: Progressing     Problem: Nutrition Deficit:  Goal: Optimize nutritional status  Outcome: Progressing

## 2025-01-11 NOTE — FLOWSHEET NOTE
01/11/25 0748   Vital Signs   Temp 98.1 °F (36.7 °C)   Temp Source Oral   Pulse 68   Heart Rate Source Monitor   Respirations 16   /78   MAP (Calculated) 90   MAP (mmHg) 90   BP Location Right upper arm   BP Method Automatic   Patient Position Semi fowlers   Oxygen Therapy   SpO2 97 %   O2 Device None (Room air)     Shift assessment completed, patient awake and alert sitting up in bed. Morning medications given see MAR. Patient denies any further concerns at this time. Call light within reach.

## 2025-01-11 NOTE — DISCHARGE INSTR - COC
Continuity of Care Form    Patient Name: Edward Barlow   :  1950  MRN:  [id number]    Admit date:  2025  Discharge date:  ***    Code Status Order: Full Code   Advance Directives:   Advance Care Flowsheet Documentation        Date/Time Healthcare Directive Type of Healthcare Directive Copy in Chart Healthcare Agent Appointed Healthcare Agent's Name Healthcare Agent's Phone Number    [phone] Yes, patient has an advance directive for healthcare treatment  Living will  No, copy requested from family  Spouse  Ольга Barlow  [phone]                     Admitting Physician:  Faith Dominguez MD  PCP: Ellis Ordonez MD    Discharging Nurse: ***  Discharging Hospital Unit/Room#: 3221/3221-01  Discharging Unit Phone Number: ***    Emergency Contact:   Extended Emergency Contact Information  Primary Emergency Contact: Ольга Barlow  Address: [address] [place] [address]  Home Phone: [phone]  Mobile Phone: [phone]  Relation: Spouse    Past Surgical History:  Past Surgical History:   Procedure Laterality Date    ERCP N/A 10/23/2024    ENDOSCOPIC RETROGRADE CHOLANGIOPANCREATOGRAPHY SPHINCTER/PAPILLOTOMY performed by Tom Dias MD at [facility] ENDOSCOPY    ERCP N/A 10/23/2024    ENDOSCOPIC RETROGRADE CHOLANGIOPANCREATOGRAPHY STONE REMOVAL performed by Tom Dias MD at [facility] ENDOSCOPY    ERCP N/A 10/23/2024    ENDOSCOPIC RETROGRADE CHOLANGIOPANCREATOGRAPHY STENT INSERTION performed by Tom Dias MD at [facility] ENDOSCOPY    MASTECTOMY      double    TUMOR REMOVAL  2014    positive bladder tumor    UPPER GASTROINTESTINAL ENDOSCOPY N/A 10/23/2024    ENDOSCOPIC ULTRASOUND with biopsy performed by Tom Dias MD at [facility] ENDOSCOPY    UPPER GASTROINTESTINAL ENDOSCOPY N/A 2024    ESOPHAGOGASTRODUODENOSCOPY SUBMUCOSAL INJECTION performed by Tom Dias MD at [facility] ENDOSCOPY    UPPER GASTROINTESTINAL ENDOSCOPY N/A 2024    ESOPHAGOGASTRODUODENOSCOPY WITH  [Urine:2300]    Safety Concerns:     None    Impairments/Disabilities:      Vision and Hearing    Nutrition Therapy:  Current Nutrition Therapy:   - Tube Feedings:  Standard with fiber    Routes of Feeding: Gastrostomy Tube  Liquids: Thin Liquids  Daily Fluid Restriction: no  Last Modified Barium Swallow with Video (Video Swallowing Test): not done    Treatments at the Time of Hospital Discharge:   Respiratory Treatments: None  Oxygen Therapy:  is not on home oxygen therapy.  Ventilator:    - No ventilator support    Rehab Therapies: None  Weight Bearing Status/Restrictions: No weight bearing restrictions  Other Medical Equipment (for information only, NOT a DME order):  None  Other Treatments:   Tube feeding    Patient's personal belongings (please select all that are sent with patient):  Glasses    RN SIGNATURE:  Electronically signed by Miguelina Andrade RN on 1/17/25 at 2:07 PM EST    CASE MANAGEMENT/SOCIAL WORK SECTION    Inpatient Status Date: 1/4/25    Readmission Risk Assessment Score:  [facility] RISK OF UNPLANNED READMISSION 2.0             27.8 Total Score        Discharging to Facility/ Agency   [facility] CARE  Services: Home Health Services   Address: [address] [place] [id number]   Phone: [phone]       [facility]  Services: Home Health Services   Address: [address] [place], [place] [address]   Phone: [phone]       / signature: Electronically signed by Miguelina Andrade RN on 1/17/25 at 2:07 PM EST    PHYSICIAN SECTION    Prognosis: Good    Condition at Discharge: Stable    Rehab Potential (if transferring to Rehab): Good    Recommended Labs or Other Treatments After Discharge: RN PT    Physician Certification: I certify the above information and transfer of Edward Barlow  is necessary for the continuing treatment of the diagnosis listed and that he requires Home Care for greater 30 days.     Update Admission H&P: No change

## 2025-01-11 NOTE — PROGRESS NOTES
[facility] - Rehabilitation Department       Occupational Therapy  3221/3221-01  [facility] 3 PROGRESSIVE CARE    [x] Initial Evaluation            [] Daily Treatment Note         [x] Discharge Summary      Patient: Edward Barlwo   : 1950   MRN: [id number]   Date of Service:  2025    Admitting Diagnosis:  SBO (small bowel obstruction) (HCC)  Referring Physician: Ashley Hooker APRN - CNP   Current Admission Summary: Ashley Hooker APRN - CNP Hospitalist Progress Note 1/10:  \"Hospital Day: 6                                                                Date of Admission: 2025     Chief Complaint: Nausea and vomiting     Hospital Course: Edward Barlow is a 74 y.o. male with past medical history of recently diagnosed pulmonary embolism on Eliquis, chronic pancreatitis status post NG tube placement, recent community-acquired pneumonia class III malnutrition, celiac disease, hypertension, hyperlipidemia, history of TIA and bladder cancer.  He presented to the emergency department on account of nausea and vomiting of 1 day duration.  He had an NG tube placed 2024 but this was discontinued yesterday -- it was blocked after his diet has been expanded under GI oversight.  Patient denies any other symptoms.       CT of the abdomen pelvis showed some improvement in pancreatic findings but an increase in gastric distention and soft tissue thickening suspicious for SBO in the transverse duodenum, another small bowel dilation and transition point in right abdomen concerning for an additional small bowel obstruction. No significant biliary duct dilatation with biliary stent in place. There is also less conspicuous RLL pulm embolism, with improvement bilateral lower lobe consolidations.         EGD-PEG tube placement 2025 by GI  EGD-gastritis, normal duodenum, placement of externally removable PEG tube.     Subjective: Patient seen and examined at bedside this morning.  Wife is present patient  on heparin gtt as he is NPO for now -- resume Eliquis once on a diet.  Continue heparin drip until cleared by GI to resume home Eliquis     Recent community-acquired pneumonia: Interval improvement in bilateral lower lobe consolidation, recent antibiotic course ended 12/31/2024.    Rocio Razo MD Gastroenterology Progress Note 1/10:  \"Admitting Physician: Faith Dominguez MD   Date of Admission: 1/4/2025  5:16 PM   Primary Care Physician: Ellis Ordonez MD      Subjective:  Edward Barlow was seen and examined. We are following for severe pancreatitis.  -- Patient with necrotic pancreatitis with infected phlegmon.  Tolerating tube feeds.    Assessment:  Hospital Problems               Last Modified POA     * (Principal) SBO (small bowel obstruction) (HCC) 1/4/2025 Yes     Chronic pancreatitis (HCC) 1/5/2025 Yes     Severe malnutrition (HCC) (Chronic) 1/6/2025 Yes     Abdominal distention 1/5/2025 Yes     SHAJI (acute kidney injury) (HCC) 1/5/2025 Yes     Lactic acidosis 1/5/2025 Yes     Dehydration 1/7/2025 Yes     Nausea and vomiting 1/7/2025 Yes     Acute on chronic pancreatitis (HCC) 1/7/2025 Yes     Neutrophilia 1/7/2025 Yes     History of biliary stent insertion 1/7/2025 Yes     Intestinal obstruction (HCC) 1/7/2025 Yes         Patient seems to be tolerating his tube feeds well.     Plan:  Continue with tube feeds and titrate off TPN.  Our service will sign off.  Recommend follow-up as an outpatient with his gastroenterologist, Dr. Dias.\"    Past Medical History:  has a past medical history of Bladder cancer (HCC), Celiac disease, Hyperlipidemia, Hypertension, Nausea and vomiting, and TIA (transient ischemic attack).  Past Surgical History:  has a past surgical history that includes Mastectomy; Vasectomy; tumor removal (06/06/2014); ERCP (N/A, 10/23/2024); Upper gastrointestinal endoscopy (N/A, 10/23/2024); ERCP (N/A, 10/23/2024); ERCP (N/A, 10/23/2024); Upper gastrointestinal endoscopy (N/A, 11/20/2024); Upper

## 2025-01-11 NOTE — PLAN OF CARE
Problem: Occupational Therapy - Adult  Goal: By Discharge: Performs self-care activities at highest level of function for planned discharge setting.  See evaluation for individualized goals.  Outcome: Adequate for Discharge   Tari Lopez OTR

## 2025-01-11 NOTE — PROGRESS NOTES
3/10.  Location: PEG tube   Pain Interventions: pain medication in place prior to arrival       Vitals/Activity Tolerance  Pt Position BP (mmHg) HR (bpm) SpO2 (%) on RA  Comments   Supine at rest 129/82 (MAP98) 69     Seated at EOB       Standing              End of session       Lines, Drains, & Tubes: IV and Peg Tube    During therapy session noted pt with fatigue.      Functional Mobility  Bed Mobility:  Supine <>Sit:Independent    Transfers:  Sit<>Stand with up to supervision    Ambulation on Level surfaces :   Assistance required stand by assistance, contact guard assistance  Assistive Device no device  Other Appliance Not applicable  Distance in feet : 150ft  Gait Mechanics: slowed carlos alberto, reduced reciprocal armswing, fixed forward gaze with increaed lateral instability noted with horizontal head turns, diminished R TKE, discontinuous steps while navigating turns  Comments:    Therapist providing assist with IV pole management     Stair Mobility:  Stair mobility not completed on this date.  Comments:  not assessed this date d/t onset of fatigue s/p level surface gait training.       Balance  Static Sitting:   [x] unsupported[] with bed rail[] in chair. Pt requires Independent to maintain  Dynamic Sitting:  [x] unsupported[] with bed rail[] in chair. Pt requires Independent to maintain  Comments:     Static Standing: [x] with assistive device [] without assistive device.Pt requires Supervision to maintain:   Dynamic Standing: [x] with assistive device [] without assistive device.Pt requires   SBA-CGA  to maintain:   Comments:     Other Therapeutic Interventions    Functional Outcomes: #0 Second Sit to Stand: x7 reps from bedside recliner with use of BUE for push off  AM-PAC Inpatient Mobility Raw Score : 22              Education  Barriers To Learning: none  Patient Education: patient educated on plan of care, discharge recommendations, goals, HEP, disease specific education, general safety, functional  Arava Counseling:  Patient counseled regarding adverse effects of Arava including but not limited to nausea, vomiting, abnormalities in liver function tests. Patients may develop mouth sores, rash, diarrhea, and abnormalities in blood counts. The patient understands that monitoring is required including LFTs and blood counts.  There is a rare possibility of scarring of the liver and lung problems that can occur when taking methotrexate. Persistent nausea, loss of appetite, pale stools, dark urine, cough, and shortness of breath should be reported immediately. Patient advised to discontinue Arava treatment and consult with a physician prior to attempting conception. The patient will have to undergo a treatment to eliminate Arava from the body prior to conception.

## 2025-01-11 NOTE — PROGRESS NOTES
Residual checked volume greater than 450mL tube feed rechecked in 4 hours, after 4 hours residual right at 400 mL, tube feed held for another 4 hours residual now at 300 mL will resume tube feed at 30 mL/ hr then follow previous orders increase by 15 mL q 12 hours

## 2025-01-11 NOTE — CONSULTS
TPN has been discontinued. Pharmacy will sign off consult. If medication dosing is resumed, please re-consult pharmacy.    Cuco Perkins PharmD  [facility]   [id number]  1/11/2025   8:58 AM

## 2025-01-11 NOTE — PROGRESS NOTES
Hospitalist Progress Note      PCP: Ellis Ordonez MD    Date of Admission: 1/4/2025    Chief Complaint: Nausea and vomiting    Hospital Course: Edward Barlow is a 74 y.o. male with past medical history of recently diagnosed pulmonary embolism on Eliquis, chronic pancreatitis status post NG tube placement, recent community-acquired pneumonia class III malnutrition, celiac disease, hypertension, hyperlipidemia, history of TIA and bladder cancer. He presented to the emergency department on account of nausea and vomiting of 1 day duration. He had an NG tube placed 12/9/2024 but this was discontinued 1/3/25-- it was blocked after his diet has been expanded under GI oversight. Patient denies any other symptoms.  Admitted for further workup and treatment.  Found to have SBO on admission.  See details below.    Subjective: Patient laying in bed, denies nausea vomiting abdominal pain other than a little pain at the PEG tube insertion site.  Noted to have high tube feed residuals last night and have persisted throughout the day despite decreasing tube feed rate.  Will be rechecking again this afternoon and if still elevated, reach back out to GI.  He denies chest pain shortness of breath palpitation headache.  Wife at bedside.  Reviewed plan of care with patient and wife, no further needs or questions.    1500: Tube feed reviewed residual remains 335 mL.  GI reconsulted.  Messaged GI physician via [employer].  Recommend to hold tube feed today.    Will resume TPN.  Order placed for pharmacy to dose TPN.  Called and spoke to pharmacist.  Given the time of the day, TPN likely not able to be made and started until tomorrow.    Assessment/Plan:    Small bowel obstruction  Hige tube feed residuals  -noted on imaging and transverse duodenum with an additional transition point + dilatation in the right abdomen.   -General Surgery consulted and following peripherally  -1/8/2025 PEG tube placed  -Large bowel movement

## 2025-01-12 LAB
ANION GAP SERPL CALCULATED.3IONS-SCNC: 8 MMOL/L (ref 3–16)
BUN SERPL-MCNC: 9 MG/DL (ref 7–20)
CALCIUM SERPL-MCNC: 9.6 MG/DL (ref 8.3–10.6)
CHLORIDE SERPL-SCNC: 105 MMOL/L (ref 99–110)
CO2 SERPL-SCNC: 25 MMOL/L (ref 21–32)
CREAT SERPL-MCNC: 0.6 MG/DL (ref 0.8–1.3)
CRP SERPL HS-MCNC: 10.5 MG/L (ref 0–5.1)
GFR, ESTIMATED: >90 ML/MIN/1.73M2
GLUCOSE BLD-MCNC: 104 MG/DL (ref 70–99)
GLUCOSE BLD-MCNC: 119 MG/DL (ref 70–99)
GLUCOSE BLD-MCNC: 137 MG/DL (ref 70–99)
GLUCOSE BLD-MCNC: 142 MG/DL (ref 70–99)
GLUCOSE SERPL-MCNC: 105 MG/DL (ref 70–99)
MAGNESIUM SERPL-MCNC: 2.3 MG/DL (ref 1.8–2.4)
PARTIAL THROMBOPLASTIN TIME: 130.8 SEC (ref 22.1–36.4)
PARTIAL THROMBOPLASTIN TIME: 85.6 SEC (ref 22.1–36.4)
PARTIAL THROMBOPLASTIN TIME: 94.2 SEC (ref 22.1–36.4)
PHOSPHATE SERPL-MCNC: 2.4 MG/DL (ref 2.5–4.9)
POTASSIUM SERPL-SCNC: 4.2 MMOL/L (ref 3.5–5.1)
SODIUM SERPL-SCNC: 137 MMOL/L (ref 136–145)

## 2025-01-12 PROCEDURE — 2580000003 HC RX 258: Performed by: INTERNAL MEDICINE

## 2025-01-12 PROCEDURE — 99232 SBSQ HOSP IP/OBS MODERATE 35: CPT | Performed by: SURGERY

## 2025-01-12 PROCEDURE — 82962 GLUCOSE BLOOD TEST: CPT

## 2025-01-12 PROCEDURE — 2500000003 HC RX 250 WO HCPCS: Performed by: NURSE PRACTITIONER

## 2025-01-12 PROCEDURE — 2500000003 HC RX 250 WO HCPCS: Performed by: STUDENT IN AN ORGANIZED HEALTH CARE EDUCATION/TRAINING PROGRAM

## 2025-01-12 PROCEDURE — 2060000000 HC ICU INTERMEDIATE R&B

## 2025-01-12 PROCEDURE — 84100 ASSAY OF PHOSPHORUS: CPT

## 2025-01-12 PROCEDURE — 86140 C-REACTIVE PROTEIN: CPT

## 2025-01-12 PROCEDURE — 6370000000 HC RX 637 (ALT 250 FOR IP): Performed by: SURGERY

## 2025-01-12 PROCEDURE — 80048 BASIC METABOLIC PNL TOTAL CA: CPT

## 2025-01-12 PROCEDURE — 83735 ASSAY OF MAGNESIUM: CPT

## 2025-01-12 PROCEDURE — 2580000003 HC RX 258: Performed by: NURSE PRACTITIONER

## 2025-01-12 PROCEDURE — 2580000003 HC RX 258: Performed by: STUDENT IN AN ORGANIZED HEALTH CARE EDUCATION/TRAINING PROGRAM

## 2025-01-12 PROCEDURE — 36415 COLL VENOUS BLD VENIPUNCTURE: CPT

## 2025-01-12 PROCEDURE — 85730 THROMBOPLASTIN TIME PARTIAL: CPT

## 2025-01-12 PROCEDURE — 6360000002 HC RX W HCPCS: Performed by: STUDENT IN AN ORGANIZED HEALTH CARE EDUCATION/TRAINING PROGRAM

## 2025-01-12 PROCEDURE — 2500000003 HC RX 250 WO HCPCS: Performed by: INTERNAL MEDICINE

## 2025-01-12 PROCEDURE — 6360000002 HC RX W HCPCS: Performed by: INTERNAL MEDICINE

## 2025-01-12 PROCEDURE — 6370000000 HC RX 637 (ALT 250 FOR IP): Performed by: NURSE PRACTITIONER

## 2025-01-12 PROCEDURE — 6360000002 HC RX W HCPCS: Performed by: NURSE PRACTITIONER

## 2025-01-12 RX ORDER — POLYETHYLENE GLYCOL 3350 17 G/17G
17 POWDER, FOR SOLUTION ORAL DAILY
Status: DISCONTINUED | OUTPATIENT
Start: 2025-01-12 | End: 2025-01-17 | Stop reason: HOSPADM

## 2025-01-12 RX ORDER — DEXTROSE MONOHYDRATE 100 MG/ML
INJECTION, SOLUTION INTRAVENOUS CONTINUOUS
Status: DISCONTINUED | OUTPATIENT
Start: 2025-01-12 | End: 2025-01-12 | Stop reason: ALTCHOICE

## 2025-01-12 RX ADMIN — LEUCINE, PHENYLALANINE, LYSINE, METHIONINE, ISOLEUCINE, VALINE, HISTIDINE, THREONINE, TRYPTOPHAN, ALANINE, GLYCINE, ARGININE, PROLINE, SERINE, TYROSINE, SODIUM ACETATE, DIBASIC POTASSIUM PHOSPHATE, MAGNESIUM CHLORIDE, SODIUM CHLORIDE, CALCIUM CHLORIDE, DEXTROSE
1035; 575; 33; 20; 515; 240; 300; 365; 290; 51; 200; 280; 261; 340; 250; 340; 59; 210; 90; 20; 290 INJECTION INTRAVENOUS at 12:25

## 2025-01-12 RX ADMIN — SODIUM CHLORIDE, PRESERVATIVE FREE 10 ML: 5 INJECTION INTRAVENOUS at 21:52

## 2025-01-12 RX ADMIN — SODIUM CHLORIDE, PRESERVATIVE FREE 10 ML: 5 INJECTION INTRAVENOUS at 09:22

## 2025-01-12 RX ADMIN — Medication 40 MG: at 09:21

## 2025-01-12 RX ADMIN — MEROPENEM 1000 MG: 1 INJECTION INTRAVENOUS at 12:19

## 2025-01-12 RX ADMIN — POLYETHYLENE GLYCOL 3350 17 G: 17 POWDER, FOR SOLUTION ORAL at 09:21

## 2025-01-12 RX ADMIN — SODIUM CHLORIDE, PRESERVATIVE FREE 10 ML: 5 INJECTION INTRAVENOUS at 21:53

## 2025-01-12 RX ADMIN — Medication 10 ML: at 21:52

## 2025-01-12 RX ADMIN — DEXTROSE MONOHYDRATE: 100 INJECTION, SOLUTION INTRAVENOUS at 11:10

## 2025-01-12 RX ADMIN — BISACODYL 10 MG: 10 SUPPOSITORY RECTAL at 09:21

## 2025-01-12 RX ADMIN — HEPARIN SODIUM 22 UNITS/KG/HR: 10000 INJECTION, SOLUTION INTRAVENOUS at 04:17

## 2025-01-12 RX ADMIN — MEROPENEM 1000 MG: 1 INJECTION INTRAVENOUS at 21:35

## 2025-01-12 RX ADMIN — MEROPENEM 1000 MG: 1 INJECTION INTRAVENOUS at 04:12

## 2025-01-12 RX ADMIN — HYDROMORPHONE HYDROCHLORIDE 0.25 MG: 1 INJECTION, SOLUTION INTRAMUSCULAR; INTRAVENOUS; SUBCUTANEOUS at 02:06

## 2025-01-12 ASSESSMENT — PAIN SCALES - GENERAL
PAINLEVEL_OUTOF10: 0
PAINLEVEL_OUTOF10: 4

## 2025-01-12 ASSESSMENT — PAIN - FUNCTIONAL ASSESSMENT: PAIN_FUNCTIONAL_ASSESSMENT: ACTIVITIES ARE NOT PREVENTED

## 2025-01-12 ASSESSMENT — PAIN DESCRIPTION - LOCATION: LOCATION: ABDOMEN

## 2025-01-12 ASSESSMENT — PAIN DESCRIPTION - DESCRIPTORS: DESCRIPTORS: SHARP

## 2025-01-12 ASSESSMENT — PAIN DESCRIPTION - ORIENTATION: ORIENTATION: LEFT

## 2025-01-12 NOTE — PROGRESS NOTES
[facility]    Pharmacy Progress Note    TPN - Management by Pharmacy    Consult Date(s): 1/11/25  Consulting Provider(s): Lory Lees APRN-CNP    Assessment / Plan  SBO/chronic pancreatitis with partial WHALEY - Parenteral Nutrition  Day of Therapy: 1  Formulation:  Clinimix E 5/20  Clinimix Rate:  40 mL/hr (Total volume = 960 mL/day)  Please refer to dietician note 1/6/25 for goals  Requested to start TPN as soon as possible per primary team, so patient will get total of 1200 ml from now until next bag is due  Lipids:  20% 250mL over 12 hours on Mon & Thurs only (due to national shortage)  Patient will receive lipids starting on 1/23/25 (~ 10 days after TPN start)  Appreciate Clinical Dietitian's recommendations for formulation and goal rate.  Electrolytes:    Desired Amount per Day  Amount To Add to Bag Change from Previous Day in Amount per Day (Increase/Decrease)   MVI (mL) 0 0 MWF Only    Trace (mL) 0 0 MWF Only   Insulin (units) 0 0     Famotidine (mg) 0 0     Other 0 0       Maintenance IVF:  D10W running @ 50 ml/hr  Glucose control:  No history of DM  BG <180, will monitor if need to add sliding scale insulin on board  Add MVI 10 mL/day & Trace Elements 1 mL/day in PN on Mon-Wed-Fri only (due to national shortage)   Daily renal panel, daily magnesium, and weekly triglycerides ordered per protocol.  PN will be re-ordered daily.    Thank you for consulting pharmacy,    Cuco Perkins, Juanis  [facility]   [id number]  1/12/2025   11:29 AM        Subjective/Objective:   Edward Barlow is a 74 y.o. male with a PMHx significant for recently diagnosed pulmonary embolism on Eliquis, chronic pancreatitis who is admitted with partial SBO.      Pharmacy is consulted to dose TPN.    Ht Readings from Last 1 Encounters:   01/10/25 1.778 m (5' 10\")     Wt Readings from Last 1 Encounters:   01/12/25 63.8 kg (140 lb 10.5 oz)     Recent Labs     01/11/25  0709 01/12/25  0516   NA  140 137   K 4.0 4.2    105   CO2 25 25   PHOS 2.1* 2.4*   GLUCOSE 117* 105*   BUN 13 9   CREATININE 0.5* 0.6*   CALCIUM 9.1 9.6   MG 2.2 2.3     Albumin   Date Value Ref Range Status   01/10/2025 2.6 (L) 3.4 - 5.0 g/dL Final     Calcium: 9.6 mg/dL    Recent Labs     01/11/25  1420 01/11/25  1952 01/12/25  0153 01/12/25  0805   POCGLU 92 102* 104* 119*     Sliding scale insulin used since PN bag hung yesterday: 0 units    Recent Labs     01/11/25  1000   WBC 8.0   HGB 10.2*        Triglycerides   Date Value Ref Range Status   01/09/2025 59 <150 mg/dL Final   12/28/2024 115 <150 mg/dL Final   11/19/2024 72 0 - 149 mg/dL Final   11/05/2015 81 0 - 150 mg/dL Final

## 2025-01-12 NOTE — PROGRESS NOTES
Hospitalist Progress Note      PCP: Ellis Ordonez MD    Date of Admission: 1/4/2025    Chief Complaint: Nausea and vomiting    Hospital Course: Edward Barlow is a 74 y.o. male with past medical history of recently diagnosed pulmonary embolism on Eliquis, chronic pancreatitis status post NG tube placement, recent community-acquired pneumonia class III malnutrition, celiac disease, hypertension, hyperlipidemia, history of TIA and bladder cancer. He presented to the emergency department on account of nausea and vomiting of 1 day duration. He had an NG tube placed 12/9/2024 but this was discontinued 1/3/25-- it was blocked after his diet has been expanded under GI oversight. Patient denies any other symptoms.  Admitted for further workup and treatment.  Found to have SBO on admission.  See details below.    Subjective: Patient laying in bed, denies nausea vomiting abdominal pain other than a little pain at the PEG tube insertion site.  PEG tube residuals remain around to 100 mL.  Tube feed has been off since about 3 PM yesterday.  Starting TPN today.  He denies chest pain shortness of breath palpitation headache.  Wife at bedside.  Reviewed plan of care with patient and wife, no further needs or questions.        Assessment/Plan:    Small bowel obstruction  Hige tube feed residuals  -noted on imaging and transverse duodenum with an additional transition point + dilatation in the right abdomen.   -General Surgery consulted and following peripherally  -1/8/2025 PEG tube placed  -Large bowel movement 1/11/2025  -Now with high tube feed residuals.  Question if he has been truly tolerating it all long as no residuals have been documented until last night when residual 400 mL was found.  Tube feed rate was then decreased from 65 mL to 30 mL an hour, and residuals remain 300 ml.  Tube feed turned off at 3 PM 1/11/2025, residuals remain around to 100 mL overnight.  -Will start TPN today  -Ask GI what goal residuals are

## 2025-01-12 NOTE — PLAN OF CARE
Problem: Discharge Planning  Goal: Discharge to home or other facility with appropriate resources  1/11/2025 2123 by Louann Epps RN  Outcome: Progressing  1/11/2025 0956 by Richelle Yanes RN  Outcome: Progressing  Flowsheets (Taken 1/11/2025 0956)  Discharge to home or other facility with appropriate resources:   Identify barriers to discharge with patient and caregiver   Identify discharge learning needs (meds, wound care, etc)   Arrange for needed discharge resources and transportation as appropriate     Problem: Skin/Tissue Integrity  Goal: Absence of new skin breakdown  Description: 1.  Monitor for areas of redness and/or skin breakdown  2.  Assess vascular access sites hourly  3.  Every 4-6 hours minimum:  Change oxygen saturation probe site  4.  Every 4-6 hours:  If on nasal continuous positive airway pressure, respiratory therapy assess nares and determine need for appliance change or resting period.  1/11/2025 2123 by Louann Epps RN  Outcome: Progressing  1/11/2025 0956 by Richelle Yanes RN  Outcome: Progressing     Problem: ABCDS Injury Assessment  Goal: Absence of physical injury  1/11/2025 2123 by Louann Epps RN  Outcome: Progressing  1/11/2025 0956 by Richelle Yanes RN  Outcome: Progressing     Problem: Safety - Adult  Goal: Free from fall injury  1/11/2025 2123 by Louann Epps RN  Outcome: Progressing  1/11/2025 0956 by Richelle Yanes RN  Outcome: Progressing     Problem: Pain  Goal: Verbalizes/displays adequate comfort level or baseline comfort level  1/11/2025 2123 by Louann Epps RN  Outcome: Progressing  1/11/2025 0956 by Richelle Yanes RN  Outcome: Progressing     Problem: Nutrition Deficit:  Goal: Optimize nutritional status  1/11/2025 2123 by Louann Epps RN  Outcome: Progressing  1/11/2025 0956 by Richelle Yanes RN  Outcome: Progressing     Problem: Occupational Therapy - Adult  Goal: By Discharge: Performs self-care activities at highest level of function for

## 2025-01-12 NOTE — PROGRESS NOTES
Physical Therapy- Hold    PT attempt to see pt this AM for PT treatment session. PT noted pt to have APTT of 130.8 sec outside of therapeutic range for POC. Discussed with RN who stated pt will be starting heparin drip with decreased dosage later this AM. PT will follow up with pt as pt medically appropriate and PT schedule allows.     Shanon Samson PT,DPT

## 2025-01-13 LAB
ALBUMIN SERPL-MCNC: 2.8 G/DL (ref 3.4–5)
ALBUMIN/GLOB SERPL: 0.8 {RATIO}
ALP SERPL-CCNC: 66 U/L (ref 40–129)
ALT SERPL-CCNC: 26 U/L (ref 10–40)
ANION GAP SERPL CALCULATED.3IONS-SCNC: 8 MMOL/L (ref 3–16)
AST SERPL-CCNC: 37 U/L (ref 15–37)
BASOPHILS # BLD: 0.02 K/UL (ref 0–0.2)
BASOPHILS NFR BLD: 0 %
BILIRUB DIRECT SERPL-MCNC: <0.2 MG/DL (ref 0–0.3)
BILIRUB INDIRECT SERPL-MCNC: ABNORMAL MG/DL (ref 0–1)
BILIRUB SERPL-MCNC: 0.3 MG/DL (ref 0–1)
BUN SERPL-MCNC: 11 MG/DL (ref 7–20)
CALCIUM SERPL-MCNC: 9.5 MG/DL (ref 8.3–10.6)
CHLORIDE SERPL-SCNC: 107 MMOL/L (ref 99–110)
CO2 SERPL-SCNC: 24 MMOL/L (ref 21–32)
CREAT SERPL-MCNC: 0.6 MG/DL (ref 0.8–1.3)
EOSINOPHIL # BLD: 0.22 K/UL (ref 0–0.6)
EOSINOPHILS RELATIVE PERCENT: 4 %
ERYTHROCYTE [DISTWIDTH] IN BLOOD BY AUTOMATED COUNT: 16.5 % (ref 12.4–15.4)
GFR, ESTIMATED: >90 ML/MIN/1.73M2
GLUCOSE BLD-MCNC: 125 MG/DL (ref 70–99)
GLUCOSE BLD-MCNC: 126 MG/DL (ref 70–99)
GLUCOSE BLD-MCNC: 132 MG/DL (ref 70–99)
GLUCOSE BLD-MCNC: 134 MG/DL (ref 70–99)
GLUCOSE SERPL-MCNC: 130 MG/DL (ref 70–99)
HCT VFR BLD AUTO: 33.7 % (ref 40.5–52.5)
HGB BLD-MCNC: 10.7 G/DL (ref 13.5–17.5)
IMM GRANULOCYTES # BLD AUTO: 0.02 K/UL (ref 0–0.5)
IMM GRANULOCYTES NFR BLD: 0 %
LYMPHOCYTES NFR BLD: 3.07 K/UL (ref 1–5.1)
LYMPHOCYTES RELATIVE PERCENT: 54 %
MAGNESIUM SERPL-MCNC: 2.4 MG/DL (ref 1.8–2.4)
MCH RBC QN AUTO: 28.6 PG (ref 26–34)
MCHC RBC AUTO-ENTMCNC: 31.8 G/DL (ref 31–36)
MCV RBC AUTO: 90.1 FL (ref 80–100)
MONOCYTES NFR BLD: 0.54 K/UL (ref 0–1.3)
MONOCYTES NFR BLD: 10 %
NEUTROPHILS NFR BLD: 31 %
NEUTS SEG NFR BLD: 1.78 K/UL (ref 1.7–7.7)
PARTIAL THROMBOPLASTIN TIME: 113.1 SEC (ref 22.1–36.4)
PARTIAL THROMBOPLASTIN TIME: 68.3 SEC (ref 22.1–36.4)
PARTIAL THROMBOPLASTIN TIME: 87.7 SEC (ref 22.1–36.4)
PHOSPHATE SERPL-MCNC: 2.6 MG/DL (ref 2.5–4.9)
PLATELET # BLD AUTO: 165 K/UL (ref 135–450)
PMV BLD AUTO: 12.3 FL (ref 9.4–12.4)
POTASSIUM SERPL-SCNC: 4.1 MMOL/L (ref 3.5–5.1)
PROT SERPL-MCNC: 6.1 G/DL (ref 6.4–8.2)
RBC # BLD AUTO: 3.74 M/UL (ref 4.2–5.9)
SODIUM SERPL-SCNC: 139 MMOL/L (ref 136–145)
WBC OTHER # BLD: 5.7 K/UL (ref 4–11)

## 2025-01-13 PROCEDURE — 6360000002 HC RX W HCPCS: Performed by: INTERNAL MEDICINE

## 2025-01-13 PROCEDURE — 84100 ASSAY OF PHOSPHORUS: CPT

## 2025-01-13 PROCEDURE — 83735 ASSAY OF MAGNESIUM: CPT

## 2025-01-13 PROCEDURE — 36415 COLL VENOUS BLD VENIPUNCTURE: CPT

## 2025-01-13 PROCEDURE — 85025 COMPLETE CBC W/AUTO DIFF WBC: CPT

## 2025-01-13 PROCEDURE — 2500000003 HC RX 250 WO HCPCS: Performed by: STUDENT IN AN ORGANIZED HEALTH CARE EDUCATION/TRAINING PROGRAM

## 2025-01-13 PROCEDURE — 2500000003 HC RX 250 WO HCPCS: Performed by: INTERNAL MEDICINE

## 2025-01-13 PROCEDURE — 82962 GLUCOSE BLOOD TEST: CPT

## 2025-01-13 PROCEDURE — 99232 SBSQ HOSP IP/OBS MODERATE 35: CPT | Performed by: INTERNAL MEDICINE

## 2025-01-13 PROCEDURE — 6360000002 HC RX W HCPCS: Performed by: STUDENT IN AN ORGANIZED HEALTH CARE EDUCATION/TRAINING PROGRAM

## 2025-01-13 PROCEDURE — 80053 COMPREHEN METABOLIC PANEL: CPT

## 2025-01-13 PROCEDURE — 85730 THROMBOPLASTIN TIME PARTIAL: CPT

## 2025-01-13 PROCEDURE — 2500000003 HC RX 250 WO HCPCS: Performed by: HOSPITALIST

## 2025-01-13 PROCEDURE — 6360000002 HC RX W HCPCS: Performed by: NURSE PRACTITIONER

## 2025-01-13 PROCEDURE — 2580000003 HC RX 258: Performed by: INTERNAL MEDICINE

## 2025-01-13 PROCEDURE — 2060000000 HC ICU INTERMEDIATE R&B

## 2025-01-13 PROCEDURE — 82248 BILIRUBIN DIRECT: CPT

## 2025-01-13 PROCEDURE — 2580000003 HC RX 258: Performed by: STUDENT IN AN ORGANIZED HEALTH CARE EDUCATION/TRAINING PROGRAM

## 2025-01-13 RX ORDER — METOCLOPRAMIDE HYDROCHLORIDE 5 MG/ML
5 INJECTION INTRAMUSCULAR; INTRAVENOUS EVERY 6 HOURS
Status: DISCONTINUED | OUTPATIENT
Start: 2025-01-13 | End: 2025-01-15

## 2025-01-13 RX ADMIN — HEPARIN SODIUM 19 UNITS/KG/HR: 10000 INJECTION, SOLUTION INTRAVENOUS at 00:38

## 2025-01-13 RX ADMIN — MEROPENEM 1000 MG: 1 INJECTION INTRAVENOUS at 04:26

## 2025-01-13 RX ADMIN — HEPARIN SODIUM 2000 UNITS: 1000 INJECTION INTRAVENOUS; SUBCUTANEOUS at 08:53

## 2025-01-13 RX ADMIN — MEROPENEM 1000 MG: 1 INJECTION INTRAVENOUS at 21:13

## 2025-01-13 RX ADMIN — METOCLOPRAMIDE 5 MG: 5 INJECTION, SOLUTION INTRAMUSCULAR; INTRAVENOUS at 08:47

## 2025-01-13 RX ADMIN — Medication 40 MG: at 08:47

## 2025-01-13 RX ADMIN — MEROPENEM 1000 MG: 1 INJECTION INTRAVENOUS at 12:34

## 2025-01-13 RX ADMIN — METOCLOPRAMIDE 5 MG: 5 INJECTION, SOLUTION INTRAMUSCULAR; INTRAVENOUS at 21:12

## 2025-01-13 RX ADMIN — HEPARIN SODIUM 21 UNITS/KG/HR: 10000 INJECTION, SOLUTION INTRAVENOUS at 08:57

## 2025-01-13 RX ADMIN — SODIUM CHLORIDE, PRESERVATIVE FREE 10 ML: 5 INJECTION INTRAVENOUS at 08:52

## 2025-01-13 RX ADMIN — METOCLOPRAMIDE 5 MG: 5 INJECTION, SOLUTION INTRAMUSCULAR; INTRAVENOUS at 17:00

## 2025-01-13 RX ADMIN — HEPARIN SODIUM 19 UNITS/KG/HR: 10000 INJECTION, SOLUTION INTRAVENOUS at 19:32

## 2025-01-13 RX ADMIN — ASCORBIC ACID, VITAMIN A PALMITATE, CHOLECALCIFEROL, THIAMINE HYDROCHLORIDE, RIBOFLAVIN-5 PHOSPHATE SODIUM, PYRIDOXINE HYDROCHLORIDE, NIACINAMIDE, DEXPANTHENOL, ALPHA-TOCOPHEROL ACETATE, VITAMIN K1, FOLIC ACID, BIOTIN, CYANOCOBALAMIN: 200; 3300; 200; 6; 3.6; 6; 40; 15; 10; 150; 600; 60; 5 INJECTION, SOLUTION INTRAVENOUS at 18:37

## 2025-01-13 RX ADMIN — SODIUM CHLORIDE, PRESERVATIVE FREE 10 ML: 5 INJECTION INTRAVENOUS at 21:25

## 2025-01-13 RX ADMIN — SODIUM CHLORIDE, PRESERVATIVE FREE 10 ML: 5 INJECTION INTRAVENOUS at 21:11

## 2025-01-13 NOTE — PROGRESS NOTES
mucosa with acute inflammation and ulceration  -     No evidence of dysplasia or malignancy  -     CMV immunohistochemical staining is negative      Anthony Taylor MD  **Electronically Signed Out**  sk/11/21/2024            INTERPRETATION     Celiac lymph node, biopsy:   - No malignant cells identified   - Scant specimen           [employer] Screener:  JL   **Electronically Signed Out**         MD jameson March/10/24/2024       Surgical Pathology Report  SURGICAL PATHOLOGY REPORT  Collected: 10/23/24 1029   Lab status: Final   Resulting lab: [facility]   Value: Path Number: [id number]    -- Diagnosis --    Bile duct, biopsy:- Scant detached strips of columnar epithelium with  abundant blood and fibrin clot- No evidence of dysplasia or malignancy             Anthony Taylor MD  **Electronically Signed Out**  sk/10/24/2024       MICRO: cultures reviewed and updated by me     Blood Culture 2 [id number] collected: 01/04/25 2031Order Status: CompletedSpecimen: BloodUpdated: 01/06/25 1031Specimen Description.BLOOD .ARMSpecial Requests    CultureNO GROWTH 21 HOURSBlood Culture 3 [id number] collected: 01/04/25 2010Order Status: CompletedSpecimen: BloodUpdated: 01/06/25 1031Specimen Description.BLOOD .ARMSpecial Requests    CultureNO GROWTH 21 HOURSCulture, Urine [id number] collected: 01/04/25 1846Order Status: CompletedSpecimen: UrineUpdated: 01/05/25 1320Specimen Description.URINECultureNO GROWTHCulture, Blood 2 [id number] collected: 12/07/24 1256Order Status: CompletedSpecimen: BloodUpdated: 12/11/24 0840Specimen Description.BLOOD .ARMSpecial Requests    CultureNO GROWTH 4 DAYSCulture, Blood 9 [id number] collected: 12/07/24 1230Order Status: CompletedSpecimen: BloodUpdated: 12/11/24 0840Specimen Description.BLOODSpecial Requests    CultureNO GROWTH 4 DAYS    Blood Culture: No results found for: \"BC\", \"BLOODCULT2\"    Viral Culture:    No results found for: \"COVID19\"  Urine Culture: No results for input(s):  placement for nutrition  now weaned off  TPN through PICC line.  WBC normalized blood cultures negative will be able to discontinue antibiotic once GI tract improved     Had high residuals on the Tube feeds not at goal he is on 20 cc/hr during rounds  and remains on TPN and wife at bed side         Labs, Microbiology, Radiology and pertinent results from current hospitalization and care every where were reviewed by me as a part of the consultation.    PLAN :  Cont  IV meropenem 1 g every 8 hours  x d/c in AM  stop 1/14  Check ESR  28    Check CRP > 70 NOW down to  10.5   Check procalcitonin  0.5   Blood culture NGTD   Watch for complications  Seen by gastroenterology and general surgery note reviewed  Trend WBC normalized    ON iv Heparin for anticoagulation   S/p PEG tube PLACED on 1/8/25  Will be able to d/c IV abx   GI following     Will follow intermittently call with questions-       Discussed with patient/Family and Nursing  dw wife in detail      Medical Decision Making:  The following items were considered in medical decision making:  Discussion of patient care with other providers  Reviewed clinical lab tests  Reviewed radiology tests  Reviewed other diagnostic tests/interventions  Independent review of radiologic images  Independent review of  Microbiology cultures and other micro tests reviewed     Risk of Complications/Morbidity: High      Illness(es)/ Infection present that pose threat to bodily function.   There is potential for severe exacerbation of infection/side effects of treatment.  Therapy requires intensive monitoring for antimicrobial agent toxicity.     Thanks for allowing me to participate in your patient's care please call me with any questions or concerns.    Dr. Lion Feng MD  Infectious Disease  [facility] Physician  Phone: [phone]   Fax : [phone]

## 2025-01-13 NOTE — PROGRESS NOTES
[facility] Clinician Contact & Progress Note  For Individual Resiliency Training (IRT)  A Part of the [facility] First Episode of Psychosis Program    NAVIGATE Enrollee: Bob Das (2001)     MRN: [id number]  Date:  4/09/19  Diagnosis: Psychosis, unspecified psychosis type; F29  Clinician: [facility] Individual Resiliency Trainer, Neisha Moore, [facility]      1. Type of contact: (majority of time spent)  IRT Session     2. People present:   Writer  Client: Bob Das     3. Total number of persons who participated in contact: 2; last 10 minutes with mom and Nancy Rubin SW     4. Length of Actual Contact: Start Time: 4pm; End Time: 5pm                Traveled?    No     5. Location of contact:  [facility], [place]     6. Did the client complete the home practice option(s) from the previous session: Completed     7. Motivational Teaching Strategies:  Connect info and skills with personal goals  Promote hope and positive expectations  Explore pros and cons of change  Re-frame experiences in positive light     8. Educational Teaching Strategies:  Review of written material/education  Relate information to client's experience  Ask questions to check comprehension  Break down information into small chunks  Adopt client's language      9. CBT Teaching Strategies:  Reinforcement and shaping (positive feedback for steps towards goals, gains in knowledge & skills and follow-through on home assignments)  Coping skills training (review current coping skills, increase currently used skills, model new skill, role play new skill, feedback and plan home practice)  Relaxation training (model relaxation technique, practice technique, feedback and plan home practice)     10. IRT Module(s) Addressed:  Module 2 - Assessment/Initial Goal Setting  Module 3- Education about Psychosis  Coping with Symptoms     11. Techniques utilized:   [place] announced at beginning of session  Review of homework  Review of  Patient A&O, vitals stable. TPN and Heparin infusing as ordered through PICC line. Patient had two BM's today. Peg tube gastric residual is greater than 120ml. Patient was up out of bed ambulating today. Spouse at bedside.   "goal  Review of previous meeting  Present new material  Role-play  Problem-solving practice  Help client choose a home practice option  Summarize progress made in current session     12. Mental Status Exam:     Alertness: oriented  Appearance: adequately groomed  Behavior/Demeanor: cooperative, with poor eye contact   Speech: increased latency of response  Language: word finding difficulty. Preferred language identified as English.  Psychomotor: normal or unremarkable  Mood: description consistent with euthymia  Affect:restricted; was congruent to mood; was congruent to content  Thought Process/Associations: unremarkable  Thought Content:  Reports preoccupations;  Denies suicidal and violent ideation  Perception:  Reports auditory hallucinations and visual hallucinations;  Denies none  Insight:fair  Judgment: fair  Cognition: does  appear grossly intact; formal cognitive testing was not done  Suicidal ideation: denies SI current, reports passive ideation yesterday, denies intent,  and denies plan  Homicidal Ideation: denies      13. Assessment/Progress Note:     Writer met client for weekly IRT session. Client reported he was doing okay and \"better than yesterday.\" Explained that he had a panic attack last night while at home. Spent time discussing panic attack, events leading up to it, early signs, and coping strategies. Client described having an off day yesterday and hearing more voices. He could not remember specific triggers, but thinks he was worried about something he said to someone. Reviewed how thoughts relate to feelings and value in making sure thoughts are accurate, based on fact. Client reported that he had SI during the panic attack, but denies SI since. He reported it lasted 45 minutes and described feeling restless and couldn't think straight. He had visions of him breaking someone's neck. This was not someone specific and client denied actual thoughts or intent to hurt others. Described vision as " "distressing. Spent time discussing strategies to reduce distress and panic with relaxed breathing and grounding, imaging a peaceful scene. Practiced these in session. Discussed safety plan of using these skills but also reaching out for help when he needs it and is unable to manage on his own. Discussed talking to his mom or calling crisis. Client reported he thinks he will be able to do this but does not want to make his mom upset or worried. Discussed this and joined family clinician, KASSANDRA Scales and client's mom, Melissa. Updated on client's panic attack yesterday and discussed plan to continue to practice communication strategies client can use with mom to ask for help. Melissa also reminded client that he can use his propranolol as needed for anxiety per Anita Lancaster, CNP.    14. Plan/Referrals:     Writer and client will meet next week for next IRT session to review coping, assess symptoms, and discuss communication strategies with mom.     Billing for \"Interactive Complexity\"?    No      Neisha Moore, [facility] HospitalSW    NAVIGATE Individual Resiliency Trainer  "

## 2025-01-13 NOTE — PROGRESS NOTES
Comprehensive Nutrition Assessment    Type and Reason for Visit:  Reassess    Nutrition Recommendations/Plan:   Diet: NPO  Nutrition support:  Continue TF regimen, with slow advancement and residual checks.  Jevity 1.5 with goal rate of 65 mL/hr, slowly advancing by 5 mL q 8 hrs as long as residuals less than 120 mL (per GI).  Continue TPN and advance as appropriate  Clinimix 5/20 @ 40 mL/hr. Goal rate is 83 mL/hr.  Monitor: Labs, weight, nutrition tolerance, residuals     Malnutrition Assessment:  Malnutrition Status:  Severe malnutrition (01/06/25 1008)    Context:  Chronic Illness     Findings of the 6 clinical characteristics of malnutrition:  Energy Intake:  75% or less estimated energy requirements for 1 month or longer  Weight Loss:  5% over 1 month     Body Fat Loss:  Unable to assess     Muscle Mass Loss:  Unable to assess    Fluid Accumulation:  No fluid accumulation     Strength:  Not Performed    Nutrition Assessment:    Follow-up. Pt's TF was turned off on 1/11 d/t high residuals. TF remained off d/t residuals continuing in stomach. TPN restarted, currently at 40 mL/hr. Previous TPN rec's remain appropriate for pt, pharmacy following. Reglan started this AM. TF now back running @ 15 mL/hr, with slow advancements of 5 mL/hr every 8 hrs per GI as long as residuals less than 120 mL. Will continue to monitor tolerance to TF and TPN. Maintain bowel movements.    Nutrition Related Findings:    BM 1/12; Glu 126; PEG in place Wound Type: Pressure Injury (buttock)       Current Nutrition Intake & Therapies:    Average Meal Intake: NPO  Average Supplements Intake: NPO  Diet NPO Exceptions are: Ice Chips, Popsicles  PN-Adult Premix 5/20 - Standard Electrolytes - Central Line  ADULT TUBE FEEDING; PEG; Standard with Fiber; Continuous; 10; Yes; 5; Q 8 hours; 65; 30; Q 8 hours  Current Tube Feeding (TF) Orders:  Feeding Route: PEG  Formula: Standard with Fiber  Schedule: Continuous  Feeding Regimen: @ 65

## 2025-01-13 NOTE — PROGRESS NOTES
Hospitalist Progress Note      PCP: Ellis Ordonez MD    Date of Admission: 1/4/2025    Chief Complaint: Nausea and vomiting    Hospital Course: Edward Barlow is a 74 y.o. male with past medical history of recently diagnosed pulmonary embolism on Eliquis, chronic pancreatitis status post NG tube placement, recent community-acquired pneumonia class III malnutrition, celiac disease, hypertension, hyperlipidemia, history of TIA and bladder cancer. He presented to the emergency department on account of nausea and vomiting of 1 day duration. He had an NG tube placed 12/9/2024 but this was discontinued 1/3/25-- it was blocked after his diet has been expanded under GI oversight. Patient denies any other symptoms.  Admitted for further workup and treatment.  Found to have SBO on admission.  See details below.    Subjective: Patient sitting up in chair, had been ambulating in [place]ato earlier today.  Complains of minimal pain to the PEG tube site but it is better.  Just now tolerating tube feeds, advance to 15 mL an hour.  Surgery may note okay to resume Reglan, this was ordered IV.  He denies chest pain shortness of breath palpitation headache.  Wife at bedside.  Reviewed plan of care with patient and wife, no further needs or questions.        Assessment/Plan:    Small bowel obstruction  Hige tube feed residuals  -noted on imaging and transverse duodenum with an additional transition point + dilatation in the right abdomen.   -1/8/2025 PEG tube placed  -Large bowel movement 1/11/2025  -Had high tube feed residuals over the weekend up to 400 mL.  -Tube feed residuals improved, less than 120 mL now.  Tube feed was resumed 1/12/2025, currently at 15 mL an hour, goal rate 65 mL an hour.  Slowly advance tube feed rate  -TPN resumed 1/12/2025  -General Surgery okay with resumption of Reglan, 5 mg IV every 6 hours ordered  -GI following, recommend up and ambulating frequently as this will help with residuals 2.  -Patient is not  palpable, equal bilaterally       Labs:   Recent Labs     01/11/25  1000 01/13/25  1030   WBC 8.0 5.7   HGB 10.2* 10.7*   HCT 32.6* 33.7*    165     Recent Labs     01/11/25  0709 01/12/25  0516 01/13/25  1030    137 139   K 4.0 4.2 4.1    105 107   CO2 25 25 24   BUN 13 9 11   CREATININE 0.5* 0.6* 0.6*   CALCIUM 9.1 9.6 9.5   PHOS 2.1* 2.4* 2.6     Recent Labs     01/13/25  1030   AST 37   ALT 26   BILIDIR <0.2   BILITOT 0.3   ALKPHOS 66     No results for input(s): \"INR\" in the last 72 hours.  No results for input(s): \"CKTOTAL\", \"TROPONINI\" in the last 72 hours.    Urinalysis:      Lab Results   Component Value Date/Time    NITRU NEGATIVE 01/04/2025 06:46 PM    WBCUA 21 TO 50 01/04/2025 06:46 PM    BACTERIA 1+ 01/04/2025 06:46 PM    RBCUA 21 TO 50 01/04/2025 06:46 PM    BLOODU Negative 09/25/2024 11:16 AM    GLUCOSEU NEGATIVE 01/04/2025 06:46 PM       Radiology:  CT ABDOMEN PELVIS W IV CONTRAST Additional Contrast? None   Final Result      1. Gastric antral thickening likely representing antritis.   2. Associated poor definition of pancreas in the region of the head and proximal   body that may represent secondary pancreatitis.   3. Biliary stent noted in satisfactory location.   4. Diffuse small intestinal distention of moderate severity favoring ileus. No   transition zone visualized.      Electronically signed by Farshad Mendiola      XR ABDOMEN (KUB) (SINGLE AP VIEW)   Final Result      Improvement in the number of centrally dilated loops of small bowel. Biliary stent is again noted.      Electronically signed by Marcello Prasad MD      XR CHEST PORTABLE   Final Result      1.  Nasogastric tube tip projects at the gastric body.      Electronically signed by Paul Rg      CT ABDOMEN PELVIS W IV CONTRAST Additional Contrast? None   Final Result      1.  Evolving pancreatitis with decreased peripancreatic fluid collections and also overall slightly decreased peripancreatic inflammatory soft

## 2025-01-13 NOTE — PROGRESS NOTES
General Surgery Progress Note                 PATIENT NAME: Edward Barlow   Medical Record Number: [id number]  YOB: 1950       TODAY'S DATE: 1/12/2025      Chief Complaint   Patient presents with    Nausea    Vomiting       SUBJECTIVE:  Pt seen in the afternoon  Pt feeling well. Had high residues and thus tube feeds are on hold. Had BM today.  Denies nausea, bloating       OBJECTIVE:   VITALS:  /84   Pulse 66   Temp 98.2 °F (36.8 °C) (Oral)   Resp 16   Ht 1.778 m (5' 10\")   Wt 63.8 kg (140 lb 10.5 oz)   SpO2 97%   BMI 20.18 kg/m²       INTAKE/OUTPUT:    I/O last 3 completed shifts:  In: -   Out: 2275 [Urine:2275]  I/O this shift:  In: -   Out: 250 [Urine:250]              GENERAL: alert, no distress    LUNGS: bilateral symmetrical chest rise  ABDOMEN: soft, non-tender and non-distended  EXTREMITY: no cyanosis, clubbing or edema      DATA:  CBC:   Recent Labs     01/11/25  1000   WBC 8.0   HGB 10.2*   HCT 32.6*        BMP:    Recent Labs     01/10/25  0817 01/11/25  0709 01/12/25  0516    140 137   K 3.8 4.0 4.2    107 105   CO2 25 25 25   BUN 15 13 9   CREATININE 0.5* 0.5* 0.6*   GLUCOSE 135* 117* 105*     Hepatic:   Recent Labs     01/10/25  0817   AST 22   ALT 17   BILITOT <0.2   ALKPHOS 47     Mag:      Recent Labs     01/10/25  0817 01/11/25  0709 01/12/25  0516   MG 2.1 2.2 2.3      Phos:     Recent Labs     01/10/25  0817 01/11/25  0709 01/12/25  0516   PHOS 2.1* 2.1* 2.4*      INR:   No results for input(s): \"INR\" in the last 72 hours.        Radiology Review: KUB with improving bowel dilatation      Assessment  Edward Barlow is a 74 y.o. male admitted for chronic pancreatitis  1.  SBO/chronic pancreatitis and likely delayed gastric emptying / gastroparesis  Having BMs and abdomen soft. Recommend starting tube feeds at lower rates and increase very slowly. Can try Reglan      Will S R Allamaneni, MD  [phone]  Electronically signed 1/12/2025 at 10:58 PM

## 2025-01-13 NOTE — PROGRESS NOTES
Gastroenterology Progress Note      Edward Barlow is a 74 y.o. male patient.  Principal Problem:    SBO (small bowel obstruction) (HCC)  Active Problems:    Chronic pancreatitis (HCC)    Severe malnutrition (HCC)    Abdominal distention    SHAJI (acute kidney injury) (HCC)    Lactic acidosis    Dehydration    Nausea and vomiting    Acute on chronic pancreatitis (HCC)    Neutrophilia    History of biliary stent insertion    Intestinal obstruction (HCC)  Resolved Problems:    * No resolved hospital problems. *      SUBJECTIVE: This a very pleasant 74-year-old male who is in because he had pancreatitis he most likely has some form of a gastric slowing or atony    He has severe protein calorie malnourished    He has both a PEG tube and TPN    He has high PEG tube residuals but this is due to the fact that his stomach is very slow    ROS:  No fever, chills  No chest pain, palpitations  No SOB, cough  Gastrointestinal ROS: no abdominal pain, nausea, vomiting     Physical    VITALS:  /87   Pulse 82   Temp 97.3 °F (36.3 °C) (Oral)   Resp 16   Ht 1.778 m (5' 10\")   Wt 62.2 kg (137 lb 2 oz)   SpO2 98%   BMI 19.68 kg/m²   TEMPERATURE:  Current - Temp: 97.3 °F (36.3 °C); Max - Temp  Av.9 °F (36.6 °C)  Min: 97.3 °F (36.3 °C)  Max: 98.4 °F (36.9 °C)    NAD  RRR, Nl s1s2  Lungs CTA Bilaterally, normal effort  Abdomen soft, ND, NT, no HSM, Bowel sounds normal.    Data    Data Review:    Recent Labs     25  1000   WBC 8.0   HGB 10.2*   HCT 32.6*   MCV 90.1        Recent Labs     25  0709 25  0516    137   K 4.0 4.2    105   CO2 25 25   PHOS 2.1* 2.4*   BUN 13 9   CREATININE 0.5* 0.6*     No results for input(s): \"AST\", \"ALT\", \"BILIDIR\", \"BILITOT\", \"ALKPHOS\" in the last 72 hours.    Invalid input(s): \"ALB\"  No results for input(s): \"LIPASE\", \"AMYLASE\" in the last 72 hours.  No results for input(s): \"PROTIME\", \"INR\" in the last 72 hours.  Invalid input(s): \"PTT\"    Radiology

## 2025-01-13 NOTE — PLAN OF CARE
Problem: Discharge Planning  Goal: Discharge to home or other facility with appropriate resources  1/12/2025 2331 by Louann Epps RN  Outcome: Progressing  1/12/2025 1508 by Miguelina Andrade RN  Outcome: Progressing     Problem: Skin/Tissue Integrity  Goal: Absence of new skin breakdown  Description: 1.  Monitor for areas of redness and/or skin breakdown  2.  Assess vascular access sites hourly  3.  Every 4-6 hours minimum:  Change oxygen saturation probe site  4.  Every 4-6 hours:  If on nasal continuous positive airway pressure, respiratory therapy assess nares and determine need for appliance change or resting period.  1/12/2025 2331 by Louann Epps RN  Outcome: Progressing  1/12/2025 1508 by Miguelina Andrade RN  Outcome: Progressing     Problem: ABCDS Injury Assessment  Goal: Absence of physical injury  1/12/2025 2331 by Louann Epps RN  Outcome: Progressing  1/12/2025 1508 by Miguelina Andrade RN  Outcome: Progressing     Problem: Safety - Adult  Goal: Free from fall injury  1/12/2025 2331 by Louann Epps RN  Outcome: Progressing  1/12/2025 1508 by Miguelina Andrade RN  Outcome: Progressing     Problem: Pain  Goal: Verbalizes/displays adequate comfort level or baseline comfort level  1/12/2025 2331 by Louann Epps RN  Outcome: Progressing  1/12/2025 1508 by Miguelina Andrade RN  Outcome: Progressing     Problem: Nutrition Deficit:  Goal: Optimize nutritional status  1/12/2025 2331 by Louann Epps RN  Outcome: Progressing  1/12/2025 1508 by Miguelina Andrade RN  Outcome: Progressing

## 2025-01-13 NOTE — PLAN OF CARE
Problem: Discharge Planning  Goal: Discharge to home or other facility with appropriate resources  1/12/2025 2332 by Louann Epps RN  Outcome: Progressing  1/12/2025 2331 by Louann Epps RN  Outcome: Progressing  1/12/2025 1508 by Miguelina Andrade RN  Outcome: Progressing     Problem: Skin/Tissue Integrity  Goal: Absence of new skin breakdown  Description: 1.  Monitor for areas of redness and/or skin breakdown  2.  Assess vascular access sites hourly  3.  Every 4-6 hours minimum:  Change oxygen saturation probe site  4.  Every 4-6 hours:  If on nasal continuous positive airway pressure, respiratory therapy assess nares and determine need for appliance change or resting period.  1/12/2025 2332 by Louann Epps RN  Outcome: Progressing  1/12/2025 2331 by Louann Epps RN  Outcome: Progressing  1/12/2025 1508 by Miguelina Andrade RN  Outcome: Progressing     Problem: ABCDS Injury Assessment  Goal: Absence of physical injury  1/12/2025 2332 by Louann Epps RN  Outcome: Progressing  1/12/2025 2331 by Louann Epps RN  Outcome: Progressing  1/12/2025 1508 by Miguelina Andrade RN  Outcome: Progressing     Problem: Safety - Adult  Goal: Free from fall injury  1/12/2025 2332 by Louann Epps RN  Outcome: Progressing  1/12/2025 2331 by Louann Epps RN  Outcome: Progressing  1/12/2025 1508 by Miguelina Andrade RN  Outcome: Progressing     Problem: Nutrition Deficit:  Goal: Optimize nutritional status  1/12/2025 2332 by Louann Epps RN  Outcome: Progressing  1/12/2025 2331 by Louann Epps RN  Outcome: Progressing  1/12/2025 1508 by Miguelina Andrade RN  Outcome: Progressing

## 2025-01-13 NOTE — PROGRESS NOTES
[facility]    Pharmacy Progress Note    TPN - Management by Pharmacy    Consult Date(s): 1/11/25  Consulting Provider(s): Lory Lees APRN-CNP    Assessment / Plan  SBO/chronic pancreatitis with partial WHALEY - Parenteral Nutrition  Day of Therapy: 2  Formulation:  Clinimix E 5/20  Clinimix Rate:  Current rate is 40 mL/hr (Total volume = 960 mL/day). At start of new TPN bag tonight, will increase to goal rate of 83 mL/hr (Total volume = 1992 mL/day).  Please refer to dietician note 1/13/25 for goals  Lipids:  20% 250mL over 12 hours on Mon & Thurs only (due to national shortage)  Patient will receive lipids starting on 1/23/25 (~ 10 days after TPN start)  Appreciate Clinical Dietitian's recommendations for formulation and goal rate.  Electrolytes:    Desired Amount per Day  Amount To Add to Bag Change from Previous Day in Amount per Day (Increase/Decrease)   MVI (mL) 10 10 MWF Only    Trace (mL) 1 1 MWF Only   Insulin (units) 0 0     Famotidine (mg) 0 0     Other 0 0       Maintenance IVF:  None  Glucose control:  No history of DM  BG <180. Low dose correction sliding scale insulin lispro ordered.  Add MVI 10 mL/day & Trace Elements 1 mL/day in PN on Mon-Wed-Fri only (due to national shortage)   Daily renal panel, daily magnesium, and weekly triglycerides ordered per protocol.  PN will be re-ordered daily.    Thank you for consulting pharmacy,    Saritha Shields, Juanis  [facility]   [phone]  1/13/2025   11:28 AM        Subjective/Objective:   Edward Barlow is a 74 y.o. male with a PMHx significant for recently diagnosed pulmonary embolism on Eliquis, chronic pancreatitis who is admitted with partial SBO.      Pharmacy is consulted to dose TPN.    Ht Readings from Last 1 Encounters:   01/13/25 1.778 m (5' 10\")     Wt Readings from Last 1 Encounters:   01/13/25 62.2 kg (137 lb 2 oz)     Recent Labs     01/12/25  0516 01/13/25  1030    139   K 4.2 4.1    107

## 2025-01-13 NOTE — PLAN OF CARE
Problem: Discharge Planning  Goal: Discharge to home or other facility with appropriate resources  1/13/2025 1310 by Richelle Yanes RN  Outcome: Progressing  Flowsheets (Taken 1/13/2025 1310)  Discharge to home or other facility with appropriate resources:   Identify barriers to discharge with patient and caregiver   Identify discharge learning needs (meds, wound care, etc)   Arrange for needed discharge resources and transportation as appropriate  1/12/2025 2332 by Louann Epps RN  Outcome: Progressing  1/12/2025 2331 by Louann Epps RN  Outcome: Progressing     Problem: Skin/Tissue Integrity  Goal: Absence of new skin breakdown  Description: 1.  Monitor for areas of redness and/or skin breakdown  2.  Assess vascular access sites hourly  3.  Every 4-6 hours minimum:  Change oxygen saturation probe site  4.  Every 4-6 hours:  If on nasal continuous positive airway pressure, respiratory therapy assess nares and determine need for appliance change or resting period.  1/13/2025 1310 by Richelle Yanes RN  Outcome: Progressing  1/12/2025 2332 by Louann Epps RN  Outcome: Progressing  1/12/2025 2331 by Louann Epps RN  Outcome: Progressing     Problem: ABCDS Injury Assessment  Goal: Absence of physical injury  1/13/2025 1310 by Richelle Yanes RN  Outcome: Progressing  1/12/2025 2332 by Louann Epps RN  Outcome: Progressing  1/12/2025 2331 by Louann Epps RN  Outcome: Progressing     Problem: Safety - Adult  Goal: Free from fall injury  1/13/2025 1310 by Richelle Yanes RN  Outcome: Progressing  1/12/2025 2332 by Louann Epps RN  Outcome: Progressing  1/12/2025 2331 by Louann Epps RN  Outcome: Progressing     Problem: Pain  Goal: Verbalizes/displays adequate comfort level or baseline comfort level  1/13/2025 1310 by Richelle Yanes RN  Outcome: Progressing  1/12/2025 2332 by Louann Epps RN  Outcome: Progressing  1/12/2025 2331 by Louann Epps RN  Outcome: Progressing     Problem:

## 2025-01-13 NOTE — FLOWSHEET NOTE
01/13/25 0826   Vital Signs   Temp 97.3 °F (36.3 °C)   Temp Source Oral   Pulse 82   Heart Rate Source Monitor   Respirations 16   /87   MAP (Calculated) 100   BP Location Right upper arm   BP Method Automatic   Patient Position Sitting   Opioid-Induced Sedation   POSS Score 1   Oxygen Therapy   SpO2 98 %   O2 Device None (Room air)   Rhythm Interpretation   Cardiac Rhythm Sinus rhythm     Shift assessment completed, patient awake and alert sitting up in bed. Morning medications given see MAR. Increased tube feeds per order. Patient denies any further concerns at this time. Call light within reach.

## 2025-01-14 LAB
ANION GAP SERPL CALCULATED.3IONS-SCNC: 7 MMOL/L (ref 3–16)
BUN SERPL-MCNC: 14 MG/DL (ref 7–20)
CALCIUM SERPL-MCNC: 9.6 MG/DL (ref 8.3–10.6)
CHLORIDE SERPL-SCNC: 108 MMOL/L (ref 99–110)
CO2 SERPL-SCNC: 24 MMOL/L (ref 21–32)
CREAT SERPL-MCNC: 0.6 MG/DL (ref 0.8–1.3)
GFR, ESTIMATED: >90 ML/MIN/1.73M2
GLUCOSE BLD-MCNC: 110 MG/DL (ref 70–99)
GLUCOSE BLD-MCNC: 119 MG/DL (ref 70–99)
GLUCOSE BLD-MCNC: 123 MG/DL (ref 70–99)
GLUCOSE BLD-MCNC: 132 MG/DL (ref 70–99)
GLUCOSE SERPL-MCNC: 146 MG/DL (ref 70–99)
MAGNESIUM SERPL-MCNC: 2.3 MG/DL (ref 1.8–2.4)
PARTIAL THROMBOPLASTIN TIME: 78.4 SEC (ref 22.1–36.4)
PHOSPHATE SERPL-MCNC: 2.5 MG/DL (ref 2.5–4.9)
POTASSIUM SERPL-SCNC: 4 MMOL/L (ref 3.5–5.1)
SODIUM SERPL-SCNC: 139 MMOL/L (ref 136–145)

## 2025-01-14 PROCEDURE — 2500000003 HC RX 250 WO HCPCS: Performed by: NURSE PRACTITIONER

## 2025-01-14 PROCEDURE — 83735 ASSAY OF MAGNESIUM: CPT

## 2025-01-14 PROCEDURE — 36415 COLL VENOUS BLD VENIPUNCTURE: CPT

## 2025-01-14 PROCEDURE — 2500000003 HC RX 250 WO HCPCS: Performed by: INTERNAL MEDICINE

## 2025-01-14 PROCEDURE — 85730 THROMBOPLASTIN TIME PARTIAL: CPT

## 2025-01-14 PROCEDURE — 6360000002 HC RX W HCPCS: Performed by: STUDENT IN AN ORGANIZED HEALTH CARE EDUCATION/TRAINING PROGRAM

## 2025-01-14 PROCEDURE — 6360000002 HC RX W HCPCS: Performed by: INTERNAL MEDICINE

## 2025-01-14 PROCEDURE — 6370000000 HC RX 637 (ALT 250 FOR IP): Performed by: SURGERY

## 2025-01-14 PROCEDURE — 84100 ASSAY OF PHOSPHORUS: CPT

## 2025-01-14 PROCEDURE — 80048 BASIC METABOLIC PNL TOTAL CA: CPT

## 2025-01-14 PROCEDURE — 2580000003 HC RX 258: Performed by: INTERNAL MEDICINE

## 2025-01-14 PROCEDURE — 6360000002 HC RX W HCPCS: Performed by: NURSE PRACTITIONER

## 2025-01-14 PROCEDURE — 2500000003 HC RX 250 WO HCPCS: Performed by: STUDENT IN AN ORGANIZED HEALTH CARE EDUCATION/TRAINING PROGRAM

## 2025-01-14 PROCEDURE — 82962 GLUCOSE BLOOD TEST: CPT

## 2025-01-14 PROCEDURE — 2580000003 HC RX 258: Performed by: STUDENT IN AN ORGANIZED HEALTH CARE EDUCATION/TRAINING PROGRAM

## 2025-01-14 PROCEDURE — 2060000000 HC ICU INTERMEDIATE R&B

## 2025-01-14 RX ADMIN — SODIUM CHLORIDE, PRESERVATIVE FREE 10 ML: 5 INJECTION INTRAVENOUS at 08:30

## 2025-01-14 RX ADMIN — POLYETHYLENE GLYCOL 3350 17 G: 17 POWDER, FOR SOLUTION ORAL at 08:24

## 2025-01-14 RX ADMIN — SODIUM CHLORIDE, PRESERVATIVE FREE 10 ML: 5 INJECTION INTRAVENOUS at 21:00

## 2025-01-14 RX ADMIN — METOCLOPRAMIDE 5 MG: 5 INJECTION, SOLUTION INTRAMUSCULAR; INTRAVENOUS at 17:26

## 2025-01-14 RX ADMIN — METOCLOPRAMIDE 5 MG: 5 INJECTION, SOLUTION INTRAMUSCULAR; INTRAVENOUS at 08:24

## 2025-01-14 RX ADMIN — METOCLOPRAMIDE 5 MG: 5 INJECTION, SOLUTION INTRAMUSCULAR; INTRAVENOUS at 21:00

## 2025-01-14 RX ADMIN — Medication 40 MG: at 08:24

## 2025-01-14 RX ADMIN — LEUCINE, PHENYLALANINE, LYSINE, METHIONINE, ISOLEUCINE, VALINE, HISTIDINE, THREONINE, TRYPTOPHAN, ALANINE, GLYCINE, ARGININE, PROLINE, SERINE, TYROSINE, SODIUM ACETATE, DIBASIC POTASSIUM PHOSPHATE, MAGNESIUM CHLORIDE, SODIUM CHLORIDE, CALCIUM CHLORIDE, DEXTROSE
1035; 575; 33; 20; 515; 240; 300; 365; 290; 51; 200; 280; 261; 340; 250; 340; 59; 210; 90; 20; 290 INJECTION INTRAVENOUS at 18:41

## 2025-01-14 RX ADMIN — HEPARIN SODIUM 19 UNITS/KG/HR: 10000 INJECTION, SOLUTION INTRAVENOUS at 12:55

## 2025-01-14 RX ADMIN — SODIUM CHLORIDE, PRESERVATIVE FREE 10 ML: 5 INJECTION INTRAVENOUS at 21:03

## 2025-01-14 RX ADMIN — MEROPENEM 1000 MG: 1 INJECTION INTRAVENOUS at 05:04

## 2025-01-14 RX ADMIN — METOCLOPRAMIDE 5 MG: 5 INJECTION, SOLUTION INTRAMUSCULAR; INTRAVENOUS at 02:34

## 2025-01-14 NOTE — PROGRESS NOTES
Hospitalist Progress Note      PCP: Ellis Ordonez MD    Date of Admission: 1/4/2025    Chief Complaint: Nausea and vomiting    Hospital Course: Edward Barlow is a 74 y.o. male with past medical history of recently diagnosed pulmonary embolism on Eliquis, chronic pancreatitis status post NG tube placement, recent community-acquired pneumonia class III malnutrition, celiac disease, hypertension, hyperlipidemia, history of TIA and bladder cancer. He presented to the emergency department on account of nausea and vomiting of 1 day duration. He had an NG tube placed 12/9/2024 but this was discontinued 1/3/25-- it was blocked after his diet has been expanded under GI oversight. Patient denies any other symptoms.  Admitted for further workup and treatment.  Found to have SBO on admission.  See details below.    Subjective: Patient sitting up in chair, had been ambulating in [place]ato earlier today.  Complains of minimal pain to the PEG tube site but it is better.  Tube feed rate up to 20 mL an hour.  Tolerating better today.  No bowel movement yet today.  He denies chest pain shortness of breath palpitation headache.  Wife at bedside.  Reviewed plan of care with patient and wife, no further needs or questions.        Assessment/Plan:    Small bowel obstruction  High tube feed residuals  -noted on imaging and transverse duodenum with an additional transition point + dilatation in the right abdomen.   -1/8/2025 PEG tube placed  -Large bowel movement 1/12/2025  -Had high tube feed residuals over the weekend up to 400 mL.  -Tube feed residuals improved, less than 120 mL now.  Tube feed was resumed 1/12/2025, currently at 15 mL an hour, goal rate 65 mL an hour.  Slowly advance tube feed rate  -TPN resumed 1/12/2025  -General Surgery okay with resumption of Reglan, 5 mg IV every 6 hours ordered  -GI following, recommend up and ambulating frequently as this will help with residuals to.  -Patient did not want to take MiraLAX or  Dulcolax yesterday as he has been having loose stool.  However he did not have a bowel movement yesterday.  He did take his MiraLAX today.    Sepsis  -Criteria met: Lactic acidosis, leukocytosis  -Source: Recurrent pancreatitis  -ID consulted, meropenem stop date 114  -Blood culture no growth to date    SHAJI  -Resolved with IV fluids    Acute on chronic pancreatitis with necrosis  -Improved  -ID consulted, treated with meropenem, stop date 1/14/2025    Pulmonary embolism  -Initially noted 12/26/2024. On Eliquis PTA, placed on heparin gtt as he is NPO for now -- resume Eliquis once on a diet. Continue heparin drip until cleared by GI to resume home Eliquis     Recent community-acquired pneumonia: Interval improvement in bilateral lower lobe consolidation, recent antibiotic course ended 12/31/2024.    Medical history also includes class III malnutrition, hypertension, TIA and constipation. All p.o. medications are being held until 1/15/2025.     Active Hospital Problems    Diagnosis     Dehydration [E86.0]     Nausea and vomiting [R11.2]     Acute on chronic pancreatitis (HCC) [K85.90, K86.1]     Neutrophilia [D72.828]     History of biliary stent insertion [Z98.890]     Intestinal obstruction (HCC) [K56.609]     Abdominal distention [R14.0]     SHAJI (acute kidney injury) (HCC) [N17.9]     Lactic acidosis [E87.20]     SBO (small bowel obstruction) (HCC) [K56.609]     Severe malnutrition (HCC) [E43]     Chronic pancreatitis (HCC) [K86.1]        Medications:  Reviewed    Infusion Medications    PN-Adult Premix 5/20 - Standard Electrolytes - Central Line      PN-Adult Premix 5/20 - Standard Electrolytes - Central Line 83 mL/hr at 01/13/25 1837    sodium chloride      heparin (PORCINE) Infusion 19 Units/kg/hr (01/13/25 1932)    sodium chloride 5 mL/hr at 01/05/25 1053    dextrose       Scheduled Medications    metoclopramide  5 mg IntraVENous Q6H    polyethylene glycol  17 g Per G Tube Daily    bisacodyl  10 mg Rectal Daily

## 2025-01-14 NOTE — PLAN OF CARE
Problem: Discharge Planning  Goal: Discharge to home or other facility with appropriate resources  1/14/2025 0206 by Gemini Genao RN  Outcome: Progressing  Flowsheets (Taken 1/13/2025 2000)  Discharge to home or other facility with appropriate resources: Identify barriers to discharge with patient and caregiver  1/13/2025 1310 by Richelle Yanes RN  Outcome: Progressing  Flowsheets (Taken 1/13/2025 1310)  Discharge to home or other facility with appropriate resources:   Identify barriers to discharge with patient and caregiver   Identify discharge learning needs (meds, wound care, etc)   Arrange for needed discharge resources and transportation as appropriate     Problem: Skin/Tissue Integrity  Goal: Absence of new skin breakdown  Description: 1.  Monitor for areas of redness and/or skin breakdown  2.  Assess vascular access sites hourly  3.  Every 4-6 hours minimum:  Change oxygen saturation probe site  4.  Every 4-6 hours:  If on nasal continuous positive airway pressure, respiratory therapy assess nares and determine need for appliance change or resting period.  1/14/2025 0206 by Gemini Genao RN  Outcome: Progressing  1/13/2025 1310 by Richelle Yanes RN  Outcome: Progressing     Problem: ABCDS Injury Assessment  Goal: Absence of physical injury  1/14/2025 0206 by Gemini Genao RN  Outcome: Progressing  1/13/2025 1310 by Richelle Yanes RN  Outcome: Progressing     Problem: Safety - Adult  Goal: Free from fall injury  1/14/2025 0206 by Gemini Genao RN  Outcome: Progressing  1/13/2025 1310 by Richelle Yanes RN  Outcome: Progressing     Problem: Pain  Goal: Verbalizes/displays adequate comfort level or baseline comfort level  1/14/2025 0206 by Gemini Genao RN  Outcome: Progressing  1/13/2025 1310 by Richelle Yanes RN  Outcome: Progressing     Problem: Nutrition Deficit:  Goal: Optimize nutritional status  1/14/2025 0206 by Gemini Genao RN  Outcome: Progressing  1/13/2025 1310 by

## 2025-01-14 NOTE — PROGRESS NOTES
Physical Therapy  DC    Chart reviewed. Per discussion with RN + pt, pt is up ad mariela, completing functional mobility and ADLs independently without concerns for DC home. Pt with no acute PT needs at this time, will d/c orders. Please re-consult should a new need arise.     Alma Booker, PT, DPT

## 2025-01-14 NOTE — FLOWSHEET NOTE
01/14/25 0800   Vital Signs   Temp 98.1 °F (36.7 °C)   Temp Source Oral   Pulse 73   Heart Rate Source Monitor   Respirations 16   /84   MAP (Calculated) 96   BP Location Right Arm   BP Method Automatic   Patient Position Sitting   Pain Assessment   Pain Assessment None - Denies Pain   Opioid-Induced Sedation   POSS Score 1   Oxygen Therapy   SpO2 98 %   O2 Device None (Room air)     Shift assessment completed, patient awake and alert sitting up in bed. Morning medications given see MAR. Popsicle given per request. Patient denies any further concerns at this time. Call light within reach.   Tube feed residual 35mL, notified NP about residual and okay to start tube feed back up at 20ml/hr. Wife and patient aware and agreeable of POC.

## 2025-01-14 NOTE — PROGRESS NOTES
[facility]    Pharmacy Progress Note    TPN - Management by Pharmacy    Consult Date(s): 1/11/25  Consulting Provider(s): Lory Lees APRN-CNP    Assessment / Plan  SBO/chronic pancreatitis with partial WHALEY - Parenteral Nutrition  Day of Therapy: 3  Formulation:  Clinimix E 5/20  Clinimix Rate:  Current rate is 83 mL/hr (Total volume = 1,992 mL/day)  Please refer to dietician note 1/13/25 for goals  Lipids:  20% 250mL over 12 hours on Mon & Thurs only (due to national shortage)  Patient will receive lipids starting on 1/23/25 (~ 10 days after TPN start)  Appreciate Clinical Dietitian's recommendations for formulation and goal rate.  Electrolytes:    Desired Amount per Day  Amount To Add to Bag Change from Previous Day in Amount per Day (Increase/Decrease)   MVI (mL) 0 0 MWF Only    Trace (mL) 0 0 MWF Only   Insulin (units) 0 0     Famotidine (mg) 0 0     Other 0 0       Maintenance IVF:  None  Glucose control:  No history of DM  BG <180. Low dose correction sliding scale insulin lispro ordered.  Add MVI 10 mL/day & Trace Elements 1 mL/day in PN on Mon-Wed-Fri only (due to national shortage)   Daily renal panel, daily magnesium, and weekly triglycerides ordered per protocol.  PN will be re-ordered daily.    Thank you for consulting pharmacy,    Cuco Perkins, PharmD  [facility]   [phone]  1/14/2025   11:53 AM          Subjective/Objective:   Edward Barlow is a 74 y.o. male with a PMHx significant for recently diagnosed pulmonary embolism on Eliquis, chronic pancreatitis who is admitted with partial SBO.      Pharmacy is consulted to dose TPN.    Ht Readings from Last 1 Encounters:   01/13/25 1.778 m (5' 10\")     Wt Readings from Last 1 Encounters:   01/14/25 62.1 kg (136 lb 14.5 oz)     Recent Labs     01/13/25  1030 01/14/25  0403    139   K 4.1 4.0    108   CO2 24 24   PHOS 2.6 2.5   GLUCOSE 130* 146*   BUN 11 14   CREATININE 0.6* 0.6*   CALCIUM 9.5

## 2025-01-14 NOTE — PLAN OF CARE
Problem: Discharge Planning  Goal: Discharge to home or other facility with appropriate resources  1/14/2025 1130 by Richelle Yanes RN  Outcome: Progressing  Flowsheets (Taken 1/14/2025 1130)  Discharge to home or other facility with appropriate resources: Identify barriers to discharge with patient and caregiver  1/14/2025 0206 by Gemini Genao RN  Outcome: Progressing  Flowsheets (Taken 1/13/2025 2000)  Discharge to home or other facility with appropriate resources: Identify barriers to discharge with patient and caregiver     Problem: Skin/Tissue Integrity  Goal: Absence of new skin breakdown  Description: 1.  Monitor for areas of redness and/or skin breakdown  2.  Assess vascular access sites hourly  3.  Every 4-6 hours minimum:  Change oxygen saturation probe site  4.  Every 4-6 hours:  If on nasal continuous positive airway pressure, respiratory therapy assess nares and determine need for appliance change or resting period.  1/14/2025 1130 by Richelle Yanes RN  Outcome: Progressing  1/14/2025 0206 by Gemini Genao RN  Outcome: Progressing     Problem: ABCDS Injury Assessment  Goal: Absence of physical injury  1/14/2025 1130 by Richelle Yanes RN  Outcome: Progressing  Flowsheets (Taken 1/14/2025 1130)  Absence of Physical Injury: Implement safety measures based on patient assessment  1/14/2025 0206 by Gemini Genao RN  Outcome: Progressing     Problem: Safety - Adult  Goal: Free from fall injury  1/14/2025 1130 by Richelle Yanes RN  Outcome: Progressing  Flowsheets (Taken 1/14/2025 1130)  Free From Fall Injury: Instruct family/caregiver on patient safety  1/14/2025 0206 by Gemini Genao RN  Outcome: Progressing     Problem: Pain  Goal: Verbalizes/displays adequate comfort level or baseline comfort level  1/14/2025 1130 by Richelle Yanes RN  Outcome: Progressing  Flowsheets (Taken 1/14/2025 1130)  Verbalizes/displays adequate comfort level or baseline comfort level:   Administer

## 2025-01-14 NOTE — PROGRESS NOTES
Cont TF residual checked per order, 150mL aspirated. Pt denies nausea/ pain. States non BM today but is passing gas. Pt was ambulating halls with wife around 20:00pm. Dr Davis on call for GI contacted, ok to hold cont TF until am.

## 2025-01-15 ENCOUNTER — APPOINTMENT (OUTPATIENT)
Age: 75
End: 2025-01-15
Payer: MEDICARE

## 2025-01-15 LAB
ALBUMIN SERPL-MCNC: 2.8 G/DL (ref 3.4–5)
ALBUMIN/GLOB SERPL: 0.9 {RATIO}
ALP SERPL-CCNC: 69 U/L (ref 40–129)
ALT SERPL-CCNC: 29 U/L (ref 10–40)
ANION GAP SERPL CALCULATED.3IONS-SCNC: 7 MMOL/L (ref 3–16)
AST SERPL-CCNC: 28 U/L (ref 15–37)
BASOPHILS # BLD: 0.02 K/UL (ref 0–0.2)
BASOPHILS NFR BLD: 0 %
BILIRUB DIRECT SERPL-MCNC: <0.2 MG/DL (ref 0–0.3)
BILIRUB INDIRECT SERPL-MCNC: ABNORMAL MG/DL (ref 0–1)
BILIRUB SERPL-MCNC: 0.3 MG/DL (ref 0–1)
BILIRUB UR QL STRIP: NEGATIVE
BUN SERPL-MCNC: 18 MG/DL (ref 7–20)
CALCIUM SERPL-MCNC: 9.8 MG/DL (ref 8.3–10.6)
CHLORIDE SERPL-SCNC: 108 MMOL/L (ref 99–110)
CLARITY UR: ABNORMAL
CO2 SERPL-SCNC: 25 MMOL/L (ref 21–32)
COLOR UR: ABNORMAL
CREAT SERPL-MCNC: 0.6 MG/DL (ref 0.8–1.3)
EOSINOPHIL # BLD: 0.19 K/UL (ref 0–0.6)
EOSINOPHILS RELATIVE PERCENT: 3 %
ERYTHROCYTE [DISTWIDTH] IN BLOOD BY AUTOMATED COUNT: 17.2 % (ref 12.4–15.4)
GFR, ESTIMATED: >90 ML/MIN/1.73M2
GLUCOSE BLD-MCNC: 124 MG/DL (ref 70–99)
GLUCOSE BLD-MCNC: 125 MG/DL (ref 70–99)
GLUCOSE BLD-MCNC: 126 MG/DL (ref 70–99)
GLUCOSE SERPL-MCNC: 135 MG/DL (ref 70–99)
GLUCOSE UR STRIP-MCNC: NEGATIVE MG/DL
HCT VFR BLD AUTO: 33.6 % (ref 40.5–52.5)
HGB BLD-MCNC: 10.7 G/DL (ref 13.5–17.5)
HGB UR QL STRIP.AUTO: ABNORMAL
IMM GRANULOCYTES # BLD AUTO: 0.01 K/UL (ref 0–0.5)
IMM GRANULOCYTES NFR BLD: 0 %
KETONES UR STRIP-MCNC: NEGATIVE MG/DL
LEUKOCYTE ESTERASE UR QL STRIP: NEGATIVE
LYMPHOCYTES NFR BLD: 3.33 K/UL (ref 1–5.1)
LYMPHOCYTES RELATIVE PERCENT: 57 %
MAGNESIUM SERPL-MCNC: 2.3 MG/DL (ref 1.8–2.4)
MCH RBC QN AUTO: 28.9 PG (ref 26–34)
MCHC RBC AUTO-ENTMCNC: 31.8 G/DL (ref 31–36)
MCV RBC AUTO: 90.8 FL (ref 80–100)
MONOCYTES NFR BLD: 0.52 K/UL (ref 0–1.3)
MONOCYTES NFR BLD: 9 %
NEUTROPHILS NFR BLD: 31 %
NEUTS SEG NFR BLD: 1.8 K/UL (ref 1.7–7.7)
NITRITE UR QL STRIP: NEGATIVE
PARTIAL THROMBOPLASTIN TIME: 67.6 SEC (ref 22.1–36.4)
PARTIAL THROMBOPLASTIN TIME: 77.5 SEC (ref 22.1–36.4)
PARTIAL THROMBOPLASTIN TIME: 85.7 SEC (ref 22.1–36.4)
PH UR STRIP: 7 [PH] (ref 5–8)
PHOSPHATE SERPL-MCNC: 2.4 MG/DL (ref 2.5–4.9)
PLATELET # BLD AUTO: 137 K/UL (ref 135–450)
PMV BLD AUTO: 12.7 FL
POTASSIUM SERPL-SCNC: 4 MMOL/L (ref 3.5–5.1)
PROT SERPL-MCNC: 6 G/DL (ref 6.4–8.2)
PROT UR STRIP-MCNC: 30 MG/DL
RBC # BLD AUTO: 3.7 M/UL (ref 4.2–5.9)
RBC #/AREA URNS HPF: ABNORMAL /HPF
SODIUM SERPL-SCNC: 140 MMOL/L (ref 136–145)
SP GR UR STRIP: 1.01 (ref 1–1.03)
UROBILINOGEN UR STRIP-ACNC: 0.2 EU/DL (ref 0–1)
WBC #/AREA URNS HPF: ABNORMAL /HPF
WBC OTHER # BLD: 5.9 K/UL (ref 4–11)

## 2025-01-15 PROCEDURE — 81001 URINALYSIS AUTO W/SCOPE: CPT

## 2025-01-15 PROCEDURE — 6360000002 HC RX W HCPCS: Performed by: NURSE PRACTITIONER

## 2025-01-15 PROCEDURE — 85730 THROMBOPLASTIN TIME PARTIAL: CPT

## 2025-01-15 PROCEDURE — 6370000000 HC RX 637 (ALT 250 FOR IP): Performed by: SURGERY

## 2025-01-15 PROCEDURE — 2580000003 HC RX 258: Performed by: STUDENT IN AN ORGANIZED HEALTH CARE EDUCATION/TRAINING PROGRAM

## 2025-01-15 PROCEDURE — 2500000003 HC RX 250 WO HCPCS: Performed by: STUDENT IN AN ORGANIZED HEALTH CARE EDUCATION/TRAINING PROGRAM

## 2025-01-15 PROCEDURE — 51798 US URINE CAPACITY MEASURE: CPT

## 2025-01-15 PROCEDURE — 83735 ASSAY OF MAGNESIUM: CPT

## 2025-01-15 PROCEDURE — 36415 COLL VENOUS BLD VENIPUNCTURE: CPT

## 2025-01-15 PROCEDURE — 80053 COMPREHEN METABOLIC PANEL: CPT

## 2025-01-15 PROCEDURE — 84100 ASSAY OF PHOSPHORUS: CPT

## 2025-01-15 PROCEDURE — 6360000002 HC RX W HCPCS: Performed by: STUDENT IN AN ORGANIZED HEALTH CARE EDUCATION/TRAINING PROGRAM

## 2025-01-15 PROCEDURE — 85025 COMPLETE CBC W/AUTO DIFF WBC: CPT

## 2025-01-15 PROCEDURE — 2060000000 HC ICU INTERMEDIATE R&B

## 2025-01-15 PROCEDURE — 2500000003 HC RX 250 WO HCPCS: Performed by: INTERNAL MEDICINE

## 2025-01-15 PROCEDURE — 82962 GLUCOSE BLOOD TEST: CPT

## 2025-01-15 PROCEDURE — 6360000004 HC RX CONTRAST MEDICATION: Performed by: PHYSICIAN ASSISTANT

## 2025-01-15 PROCEDURE — 74178 CT ABD&PLV WO CNTR FLWD CNTR: CPT | Performed by: PHYSICIAN ASSISTANT

## 2025-01-15 PROCEDURE — 6370000000 HC RX 637 (ALT 250 FOR IP): Performed by: NURSE PRACTITIONER

## 2025-01-15 PROCEDURE — 82248 BILIRUBIN DIRECT: CPT

## 2025-01-15 PROCEDURE — 2500000003 HC RX 250 WO HCPCS: Performed by: NURSE PRACTITIONER

## 2025-01-15 RX ORDER — IOPAMIDOL 755 MG/ML
75 INJECTION, SOLUTION INTRAVASCULAR
Status: COMPLETED | OUTPATIENT
Start: 2025-01-15 | End: 2025-01-15

## 2025-01-15 RX ORDER — METOCLOPRAMIDE HYDROCHLORIDE 5 MG/ML
10 INJECTION INTRAMUSCULAR; INTRAVENOUS 2 TIMES DAILY
Status: DISCONTINUED | OUTPATIENT
Start: 2025-01-15 | End: 2025-01-17 | Stop reason: HOSPADM

## 2025-01-15 RX ADMIN — SODIUM CHLORIDE, PRESERVATIVE FREE 20 ML: 5 INJECTION INTRAVENOUS at 08:45

## 2025-01-15 RX ADMIN — SODIUM CHLORIDE, PRESERVATIVE FREE 10 ML: 5 INJECTION INTRAVENOUS at 20:09

## 2025-01-15 RX ADMIN — POLYETHYLENE GLYCOL 3350 17 G: 17 POWDER, FOR SOLUTION ORAL at 08:37

## 2025-01-15 RX ADMIN — ASCORBIC ACID, VITAMIN A PALMITATE, CHOLECALCIFEROL, THIAMINE HYDROCHLORIDE, RIBOFLAVIN-5 PHOSPHATE SODIUM, PYRIDOXINE HYDROCHLORIDE, NIACINAMIDE, DEXPANTHENOL, ALPHA-TOCOPHEROL ACETATE, VITAMIN K1, FOLIC ACID, BIOTIN, CYANOCOBALAMIN: 200; 3300; 200; 6; 3.6; 6; 40; 15; 10; 150; 600; 60; 5 INJECTION, SOLUTION INTRAVENOUS at 18:14

## 2025-01-15 RX ADMIN — METOCLOPRAMIDE 5 MG: 5 INJECTION, SOLUTION INTRAMUSCULAR; INTRAVENOUS at 03:09

## 2025-01-15 RX ADMIN — BISACODYL 10 MG: 10 SUPPOSITORY RECTAL at 08:38

## 2025-01-15 RX ADMIN — METOCLOPRAMIDE 5 MG: 5 INJECTION, SOLUTION INTRAMUSCULAR; INTRAVENOUS at 08:37

## 2025-01-15 RX ADMIN — IOPAMIDOL 75 ML: 755 INJECTION, SOLUTION INTRAVENOUS at 13:56

## 2025-01-15 RX ADMIN — METOCLOPRAMIDE 10 MG: 5 INJECTION, SOLUTION INTRAMUSCULAR; INTRAVENOUS at 20:25

## 2025-01-15 RX ADMIN — Medication 40 MG: at 08:37

## 2025-01-15 RX ADMIN — HEPARIN SODIUM 21 UNITS/KG/HR: 10000 INJECTION, SOLUTION INTRAVENOUS at 06:52

## 2025-01-15 ASSESSMENT — PAIN SCALES - WONG BAKER
WONGBAKER_NUMERICALRESPONSE: NO HURT

## 2025-01-15 ASSESSMENT — PAIN SCALES - GENERAL
PAINLEVEL_OUTOF10: 0

## 2025-01-15 NOTE — CARE COORDINATION
Per IDR pt has a new hematuria, peg and picc. Barrier to d/c is pt not on TF goal. Plan is HHC at d/c. Spoke with Sergio at [facility] to give pt update.

## 2025-01-15 NOTE — PROGRESS NOTES
[facility]    Pharmacy Progress Note    TPN - Management by Pharmacy    Consult Date(s): 1/11/25  Consulting Provider(s): Lory Lees APRN-CNP    Assessment / Plan  SBO/chronic pancreatitis with partial WHALEY - Parenteral Nutrition  Day of Therapy: 4  Formulation:  Clinimix E 5/20  Clinimix Rate:  Current rate is 83 mL/hr (Total volume = 1,992 mL/day)  Please refer to dietician note 1/13/25 for goals  Lipids:  20% 250mL over 12 hours on Mon & Thurs only (due to national shortage)  Patient will receive lipids starting on 1/23/25 (~ 10 days after TPN start)  Appreciate Clinical Dietitian's recommendations for formulation and goal rate.  Electrolytes:    Desired Amount per Day  Amount To Add to Bag Change from Previous Day in Amount per Day (Increase/Decrease)   MVI (mL) 10 10 MWF Only    Trace (mL) 1 1 MWF Only   Insulin (units) 0 0     Famotidine (mg) 0 0     Other 0 0       Maintenance IVF:  None  Glucose control:  No history of DM  BG <180. Low dose correction sliding scale insulin lispro ordered.  Add MVI 10 mL/day & Trace Elements 1 mL/day in PN on Mon-Wed-Fri only (due to national shortage)   Daily renal panel, daily magnesium, and weekly triglycerides ordered per protocol.  PN will be re-ordered daily.    Thank you for consulting pharmacy,    Cuco Perkins, PharmD  [facility]   [id number]  1/15/2025   11:26 AM          Subjective/Objective:   Edward Barlow is a 74 y.o. male with a PMHx significant for recently diagnosed pulmonary embolism on Eliquis, chronic pancreatitis who is admitted with partial SBO.      Pharmacy is consulted to dose TPN.    Ht Readings from Last 1 Encounters:   01/13/25 1.778 m (5' 10\")     Wt Readings from Last 1 Encounters:   01/14/25 62.1 kg (136 lb 14.5 oz)     Recent Labs     01/14/25  0403 01/15/25  0359    140   K 4.0 4.0    108   CO2 24 25   PHOS 2.5 2.4*   GLUCOSE 146* 135*   BUN 14 18   CREATININE 0.6* 0.6*   CALCIUM

## 2025-01-15 NOTE — PLAN OF CARE
Problem: Discharge Planning  Goal: Discharge to home or other facility with appropriate resources  1/15/2025 1523 by Miguelina Bello RN  Outcome: Progressing  1/15/2025 0230 by Gemini Genao RN  Outcome: Progressing  Flowsheets (Taken 1/14/2025 2000)  Discharge to home or other facility with appropriate resources: Identify barriers to discharge with patient and caregiver     Problem: Skin/Tissue Integrity  Goal: Absence of new skin breakdown  Description: 1.  Monitor for areas of redness and/or skin breakdown  2.  Assess vascular access sites hourly  3.  Every 4-6 hours minimum:  Change oxygen saturation probe site  4.  Every 4-6 hours:  If on nasal continuous positive airway pressure, respiratory therapy assess nares and determine need for appliance change or resting period.  1/15/2025 1523 by Miguelina Bello RN  Outcome: Progressing  1/15/2025 0230 by Gemini Genao RN  Outcome: Progressing     Problem: ABCDS Injury Assessment  Goal: Absence of physical injury  1/15/2025 1523 by Miguelina Bello RN  Outcome: Progressing  1/15/2025 0230 by Gemini Genao RN  Outcome: Progressing     Problem: Safety - Adult  Goal: Free from fall injury  1/15/2025 1523 by Miguelina Bello RN  Outcome: Progressing  1/15/2025 0230 by Gemini Genao RN  Outcome: Progressing     Problem: Pain  Goal: Verbalizes/displays adequate comfort level or baseline comfort level  1/15/2025 1523 by Miguelina Bello RN  Outcome: Progressing  1/15/2025 0230 by Gemini Genao RN  Outcome: Progressing  Flowsheets (Taken 1/14/2025 2052)  Verbalizes/displays adequate comfort level or baseline comfort level: Encourage patient to monitor pain and request assistance     Problem: Nutrition Deficit:  Goal: Optimize nutritional status  1/15/2025 1523 by Miguelina Bello RN  Outcome: Progressing  1/15/2025 0230 by Gemini Genao RN  Outcome: Progressing

## 2025-01-15 NOTE — PROGRESS NOTES
PEG tube residual feed rechecked per order, 45mL aspirated. Cont tube feed restarted at 25mL/ hr per order.

## 2025-01-15 NOTE — PROGRESS NOTES
Hospitalist Progress Note      PCP: Ellis Ordonez MD    Date of Admission: 1/4/2025    Chief Complaint: Nausea and vomiting    Hospital Course: Edward Barlow is a 74 y.o. male with past medical history of recently diagnosed pulmonary embolism on Eliquis, chronic pancreatitis status post NG tube placement, recent community-acquired pneumonia class III malnutrition, celiac disease, hypertension, hyperlipidemia, history of TIA and bladder cancer. He presented to the emergency department on account of nausea and vomiting of 1 day duration. He had an NG tube placed 12/9/2024 but this was discontinued 1/3/25-- it was blocked after his diet has been expanded under GI oversight. Patient denies any other symptoms.  Admitted for further workup and treatment.  Found to have SBO on admission.  See details below.    Subjective: Patient sitting up in chair, he has ambulated in the donato already 4 times today.  Tube feed rate up to 30 mL an hour.  Had bowel movement today.  Now having gross hematuria.  Urology consulted and planning for CT urogram.  Patient denies chest pain shortness breath palpitation new abdominal pain diarrhea headache lightheadedness or dizziness.  Ambulating well.  Reviewed plan of care, no further needs or questions.  Wife at bedside, updated on plan of care as well.    1315: Long discussion had with GI regarding tube feed and goals.  GI physician and I then discussed the plan of care with the patient and wife at the bedside.  The plan is as follows:  -Tube feed orders changed to advance tube feed 10 mL every 2 hours until goal rate of 65 mL an hour.  Per GI, okay if max tolerance rate meets 50 mL an hour. check residuals at 8 AM and 8 PM, if residual greater than 500 mL, hold tube feed for 2 hours and then resume at same rate.  If patient has any nausea, vomiting, abdominal pain/distention hold tube feed and check residual, notify provider.  -Change Reglan to 10 mg IV twice daily  -Plan for discharge  09/25/2024 11:16 AM    GLUCOSEU NEGATIVE 01/15/2025 08:15 AM       Radiology:  CT UROGRAM   Final Result   1.  Small nonobstructing right renal calculi.   2.  Resolved ileus.   3.  Unchanged appearance of the pancreas with ill-defined stranding adjacent to the pancreatic head and mass like soft tissue extending inferiorly from the enhancement process. Correlate for neoplasm.   4.  Improved bilateral lower lobe airspace disease.   5.  Unchanged thickening of the gastric antrum which could be reactive.   6.  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      Electronically signed by Armen Huynh MD      CT ABDOMEN PELVIS W IV CONTRAST Additional Contrast? None   Final Result      1. Gastric antral thickening likely representing antritis.   2. Associated poor definition of pancreas in the region of the head and proximal   body that may represent secondary pancreatitis.   3. Biliary stent noted in satisfactory location.   4. Diffuse small intestinal distention of moderate severity favoring ileus. No   transition zone visualized.      Electronically signed by Farshad Mendiola      XR ABDOMEN (KUB) (SINGLE AP VIEW)   Final Result      Improvement in the number of centrally dilated loops of small bowel. Biliary stent is again noted.      Electronically signed by Marcello Prasad MD      XR CHEST PORTABLE   Final Result      1.  Nasogastric tube tip projects at the gastric body.      Electronically signed by Paul Rg      CT ABDOMEN PELVIS W IV CONTRAST Additional Contrast? None   Final Result      1.  Evolving pancreatitis with decreased peripancreatic fluid collections and also overall slightly decreased peripancreatic inflammatory soft tissue attenuation compared to CT from 12/26/2024. However, there is interval increased gastric distention, and    soft tissue thickening results in narrowing of the transverse duodenum concerning for partial obstruction.   2.

## 2025-01-15 NOTE — PROGRESS NOTES
Gastroenterology Progress Note      Edward Barlow is a 74 y.o. male patient.  Principal Problem:    SBO (small bowel obstruction) (HCC)  Active Problems:    Chronic pancreatitis (HCC)    Severe malnutrition (HCC)    Abdominal distention    SHAJI (acute kidney injury) (HCC)    Lactic acidosis    Dehydration    Nausea and vomiting    Acute on chronic pancreatitis (HCC)    Neutrophilia    History of biliary stent insertion    Intestinal obstruction (HCC)  Resolved Problems:    * No resolved hospital problems. *      SUBJECTIVE: Patient is doing well    Looked up the [place] guidelines they say that the patient does not need to have tube feedings stopped unless residuals are greater than 500 mL or he is symptomatic    Long discussion with his nurse practitioner today and with family    ROS:  No fever, chills  No chest pain, palpitations  No SOB, cough  Gastrointestinal ROS: no abdominal pain, nausea, vomiting     Physical    VITALS:  /78   Pulse 75   Temp 97.5 °F (36.4 °C) (Axillary)   Resp 16   Ht 1.778 m (5' 10\")   Wt 62.1 kg (136 lb 14.5 oz)   SpO2 100%   BMI 19.64 kg/m²   TEMPERATURE:  Current - Temp: 97.5 °F (36.4 °C); Max - Temp  Av °F (36.7 °C)  Min: 97.5 °F (36.4 °C)  Max: 98.4 °F (36.9 °C)    NAD  RRR, Nl s1s2  Lungs CTA Bilaterally, normal effort  Abdomen soft, ND, NT, no HSM, Bowel sounds normal.    Data    Data Review:    Recent Labs     25  1030 01/15/25  0845   WBC 5.7 5.9   HGB 10.7* 10.7*   HCT 33.7* 33.6*   MCV 90.1 90.8    137     Recent Labs     25  1030 25  0403 01/15/25  0359    139 140   K 4.1 4.0 4.0    108 108   CO2 24 24 25   PHOS 2.6 2.5 2.4*   BUN 11 14 18   CREATININE 0.6* 0.6* 0.6*     Recent Labs     25  1030 01/15/25  0359   AST 37 28   ALT 26 29   BILIDIR <0.2 <0.2   BILITOT 0.3 0.3   ALKPHOS 66 69     No results for input(s): \"LIPASE\", \"AMYLASE\" in the last 72 hours.  No results for input(s): \"PROTIME\", \"INR\" in the last 72

## 2025-01-15 NOTE — PLAN OF CARE
Problem: Discharge Planning  Goal: Discharge to home or other facility with appropriate resources  Outcome: Progressing  Flowsheets (Taken 1/14/2025 2000)  Discharge to home or other facility with appropriate resources: Identify barriers to discharge with patient and caregiver     Problem: Skin/Tissue Integrity  Goal: Absence of new skin breakdown  Description: 1.  Monitor for areas of redness and/or skin breakdown  2.  Assess vascular access sites hourly  3.  Every 4-6 hours minimum:  Change oxygen saturation probe site  4.  Every 4-6 hours:  If on nasal continuous positive airway pressure, respiratory therapy assess nares and determine need for appliance change or resting period.  Outcome: Progressing     Problem: ABCDS Injury Assessment  Goal: Absence of physical injury  Outcome: Progressing     Problem: Safety - Adult  Goal: Free from fall injury  Outcome: Progressing     Problem: Pain  Goal: Verbalizes/displays adequate comfort level or baseline comfort level  Outcome: Progressing  Flowsheets (Taken 1/14/2025 2052)  Verbalizes/displays adequate comfort level or baseline comfort level: Encourage patient to monitor pain and request assistance     Problem: Nutrition Deficit:  Goal: Optimize nutritional status  Outcome: Progressing

## 2025-01-15 NOTE — PLAN OF CARE
Urology consult received for new gross hematuria today after starting heparin drip for PE while patient NPO for SBO. Patient has history of bladder Ca. No agarwal catheter in place, patient is comfortably voiding. Recommend bladder scan and CT urogram. There may be indication of CBI if significant clots or retention. Please reduce heparin dose or hold if medically able.     Mai Monahan PA-C

## 2025-01-15 NOTE — CARE COORDINATION
[employer] has checked home TPN benefits per [employer] are as follows:    TPN(formula) is covered at $81 per day    Pump Rental is $102.00 monthly    Home health provider is [facility]

## 2025-01-15 NOTE — PROGRESS NOTES
pt with blood noted in urine x 2, Shelli Lees notified via perfect serve, ok to hold scheduled heparin bolus per shelli lees.

## 2025-01-15 NOTE — PROGRESS NOTES
Gastroenterology Progress Note      Edward Barlow is a 74 y.o. male patient.  Principal Problem:    SBO (small bowel obstruction) (HCC)  Active Problems:    Chronic pancreatitis (HCC)    Severe malnutrition (HCC)    Abdominal distention    SHAJI (acute kidney injury) (HCC)    Lactic acidosis    Dehydration    Nausea and vomiting    Acute on chronic pancreatitis (HCC)    Neutrophilia    History of biliary stent insertion    Intestinal obstruction (HCC)  Resolved Problems:    * No resolved hospital problems. *      SUBJECTIVE: Patient is doing well today sitting up in a chair    Still only at 20 mL an hour    Residuals continue to be checked    Every time we check residuals and stop tube feedings it does also put back his nutrition    ROS:  No fever, chills  No chest pain, palpitations  No SOB, cough  Gastrointestinal ROS: no abdominal pain, nausea, vomiting     Physical    VITALS:  /77   Pulse 75   Temp 98.4 °F (36.9 °C) (Oral)   Resp 16   Ht 1.778 m (5' 10\")   Wt 62.1 kg (136 lb 14.5 oz)   SpO2 98%   BMI 19.64 kg/m²   TEMPERATURE:  Current - Temp: 98.4 °F (36.9 °C); Max - Temp  Av.9 °F (36.6 °C)  Min: 97.3 °F (36.3 °C)  Max: 98.4 °F (36.9 °C)    NAD  RRR, Nl s1s2  Lungs CTA Bilaterally, normal effort  Abdomen soft, ND, NT, no HSM, Bowel sounds normal.    Data    Data Review:    Recent Labs     25  1030   WBC 5.7   HGB 10.7*   HCT 33.7*   MCV 90.1        Recent Labs     25  0516 25  1030 25  0403    139 139   K 4.2 4.1 4.0    107 108   CO2 25 24 24   PHOS 2.4* 2.6 2.5   BUN 9 11 14   CREATININE 0.6* 0.6* 0.6*     Recent Labs     25  1030   AST 37   ALT 26   BILIDIR <0.2   BILITOT 0.3   ALKPHOS 66     No results for input(s): \"LIPASE\", \"AMYLASE\" in the last 72 hours.  No results for input(s): \"PROTIME\", \"INR\" in the last 72 hours.  Invalid input(s): \"PTT\"    Radiology Review:        ASSESSMENT pancreatitis with necrosis on antibiotics    Protein  calorie malnourishment  -Patient on TPN  -Patient also on tube feeds the tube feeds are what we need to help this patient and his nutrition however with the residuals continue to be checked this is slowing down our advancement of his tube feedings    Tom Dias MD           PLAN

## 2025-01-15 NOTE — PROGRESS NOTES
Tube feed residual checked per order, 160mL aspirated. TF held per order, will recheck in 4 hours.

## 2025-01-15 NOTE — PROGRESS NOTES
Addn: Spoke with NP, Lory, and discussed goals of care for pt. TF advancing 10 mL q 2 hrs until goal rate of 65 mL/hr is reached. Residuals being checked every 12 hrs (8a and 8p).     TF Residual check: Pt currently receiving prokinetic (reglan) DO NOT hold tube feedings unless patient has signs or symptoms of GI intolerance.  If residual is greater than 500 mL or the patient has signs or symptoms of GI intolerance, i.e. abdominal pain or distention, nausea or vomiting, then discard the residual, hold the tube feeding, and notify the provider.

## 2025-01-15 NOTE — PROGRESS NOTES
Pt urine noted to be dark/lo/rust-colored, appears to be blood-tinged. Pt with good urine output, hep gtt infusing per order is therapeutic. Pt denies dysuria, no c/o any at this time. Dr Dominguez notified via perfect serve, will cont to monitor for am labs/ H&H.

## 2025-01-16 LAB
ANION GAP SERPL CALCULATED.3IONS-SCNC: 7 MMOL/L (ref 3–16)
BUN SERPL-MCNC: 18 MG/DL (ref 7–20)
CALCIUM SERPL-MCNC: 9.8 MG/DL (ref 8.3–10.6)
CHLORIDE SERPL-SCNC: 107 MMOL/L (ref 99–110)
CO2 SERPL-SCNC: 25 MMOL/L (ref 21–32)
CREAT SERPL-MCNC: 0.6 MG/DL (ref 0.8–1.3)
ERYTHROCYTE [DISTWIDTH] IN BLOOD BY AUTOMATED COUNT: 17.2 % (ref 12.4–15.4)
GFR, ESTIMATED: >90 ML/MIN/1.73M2
GLUCOSE BLD-MCNC: 118 MG/DL (ref 70–99)
GLUCOSE BLD-MCNC: 120 MG/DL (ref 70–99)
GLUCOSE BLD-MCNC: 126 MG/DL (ref 70–99)
GLUCOSE BLD-MCNC: 95 MG/DL (ref 70–99)
GLUCOSE SERPL-MCNC: 136 MG/DL (ref 70–99)
HCT VFR BLD AUTO: 31.5 % (ref 40.5–52.5)
HGB BLD-MCNC: 10.2 G/DL (ref 13.5–17.5)
MAGNESIUM SERPL-MCNC: 2.3 MG/DL (ref 1.8–2.4)
MCH RBC QN AUTO: 29.2 PG (ref 26–34)
MCHC RBC AUTO-ENTMCNC: 32.4 G/DL (ref 31–36)
MCV RBC AUTO: 90.3 FL (ref 80–100)
PARTIAL THROMBOPLASTIN TIME: 27.9 SEC (ref 22.1–36.4)
PHOSPHATE SERPL-MCNC: 2.5 MG/DL (ref 2.5–4.9)
PLATELET # BLD AUTO: 155 K/UL (ref 135–450)
PMV BLD AUTO: 11.1 FL
POTASSIUM SERPL-SCNC: 4.3 MMOL/L (ref 3.5–5.1)
RBC # BLD AUTO: 3.49 M/UL (ref 4.2–5.9)
SODIUM SERPL-SCNC: 139 MMOL/L (ref 136–145)
TRIGL SERPL-MCNC: 119 MG/DL
WBC OTHER # BLD: 6.3 K/UL (ref 4–11)

## 2025-01-16 PROCEDURE — 84100 ASSAY OF PHOSPHORUS: CPT

## 2025-01-16 PROCEDURE — 6370000000 HC RX 637 (ALT 250 FOR IP): Performed by: NURSE PRACTITIONER

## 2025-01-16 PROCEDURE — 2580000003 HC RX 258: Performed by: STUDENT IN AN ORGANIZED HEALTH CARE EDUCATION/TRAINING PROGRAM

## 2025-01-16 PROCEDURE — 2500000003 HC RX 250 WO HCPCS: Performed by: INTERNAL MEDICINE

## 2025-01-16 PROCEDURE — 83735 ASSAY OF MAGNESIUM: CPT

## 2025-01-16 PROCEDURE — 6370000000 HC RX 637 (ALT 250 FOR IP): Performed by: SURGERY

## 2025-01-16 PROCEDURE — 85027 COMPLETE CBC AUTOMATED: CPT

## 2025-01-16 PROCEDURE — 2060000000 HC ICU INTERMEDIATE R&B

## 2025-01-16 PROCEDURE — 85730 THROMBOPLASTIN TIME PARTIAL: CPT

## 2025-01-16 PROCEDURE — 2500000003 HC RX 250 WO HCPCS: Performed by: HOSPITALIST

## 2025-01-16 PROCEDURE — 36415 COLL VENOUS BLD VENIPUNCTURE: CPT

## 2025-01-16 PROCEDURE — 80048 BASIC METABOLIC PNL TOTAL CA: CPT

## 2025-01-16 PROCEDURE — 6360000002 HC RX W HCPCS: Performed by: STUDENT IN AN ORGANIZED HEALTH CARE EDUCATION/TRAINING PROGRAM

## 2025-01-16 PROCEDURE — 6360000002 HC RX W HCPCS: Performed by: NURSE PRACTITIONER

## 2025-01-16 PROCEDURE — 82962 GLUCOSE BLOOD TEST: CPT

## 2025-01-16 PROCEDURE — 84478 ASSAY OF TRIGLYCERIDES: CPT

## 2025-01-16 RX ADMIN — METOCLOPRAMIDE 10 MG: 5 INJECTION, SOLUTION INTRAMUSCULAR; INTRAVENOUS at 19:58

## 2025-01-16 RX ADMIN — ASCORBIC ACID, VITAMIN A PALMITATE, CHOLECALCIFEROL, THIAMINE HYDROCHLORIDE, RIBOFLAVIN-5 PHOSPHATE SODIUM, PYRIDOXINE HYDROCHLORIDE, NIACINAMIDE, DEXPANTHENOL, ALPHA-TOCOPHEROL ACETATE, VITAMIN K1, FOLIC ACID, BIOTIN, CYANOCOBALAMIN: 200; 3300; 200; 6; 3.6; 6; 40; 15; 10; 150; 600; 60; 5 INJECTION, SOLUTION INTRAVENOUS at 14:53

## 2025-01-16 RX ADMIN — APIXABAN 5 MG: 5 TABLET, FILM COATED ORAL at 19:58

## 2025-01-16 RX ADMIN — ASCORBIC ACID, VITAMIN A PALMITATE, CHOLECALCIFEROL, THIAMINE HYDROCHLORIDE, RIBOFLAVIN-5 PHOSPHATE SODIUM, PYRIDOXINE HYDROCHLORIDE, NIACINAMIDE, DEXPANTHENOL, ALPHA-TOCOPHEROL ACETATE, VITAMIN K1, FOLIC ACID, BIOTIN, CYANOCOBALAMIN: 200; 3300; 200; 6; 3.6; 6; 40; 15; 10; 150; 600; 60; 5 INJECTION, SOLUTION INTRAVENOUS at 14:35

## 2025-01-16 RX ADMIN — APIXABAN 5 MG: 5 TABLET, FILM COATED ORAL at 08:24

## 2025-01-16 RX ADMIN — Medication 40 MG: at 08:24

## 2025-01-16 RX ADMIN — SODIUM CHLORIDE, PRESERVATIVE FREE 10 ML: 5 INJECTION INTRAVENOUS at 19:58

## 2025-01-16 RX ADMIN — POLYETHYLENE GLYCOL 3350 17 G: 17 POWDER, FOR SOLUTION ORAL at 08:24

## 2025-01-16 RX ADMIN — METOCLOPRAMIDE 10 MG: 5 INJECTION, SOLUTION INTRAMUSCULAR; INTRAVENOUS at 08:24

## 2025-01-16 ASSESSMENT — PAIN SCALES - WONG BAKER
WONGBAKER_NUMERICALRESPONSE: NO HURT

## 2025-01-16 ASSESSMENT — PAIN SCALES - GENERAL
PAINLEVEL_OUTOF10: 0

## 2025-01-16 NOTE — CARE COORDINATION
Barrier to d/c is pt not on TF goal. Plan is HHC at d/c. Spoke with Sergio at [facility] to give pt update.

## 2025-01-16 NOTE — CONSULTS
Urology Attending Consult Note      Reason for Consultation: Gross hematuria    History: 74-year-old male with recently diagnosed PE on Eliquis, chronic pancreatitis and bladder cancer treated with BCG by urologist at [facility].  He is scheduled for surveillance cystoscopy in March.  Patient now presents with nausea and vomiting.  Patient was n.p.o. and was transition to heparin gtt. because of recent PE.  After this, he developed gross hematuria.  Heparin GTT was stopped and urine is now clear.    Family History, Social History, Review of Systems:  Reviewed and agreed to as per chart    Vitals:  /86   Pulse 74   Temp 98.2 °F (36.8 °C) (Oral)   Resp 16   Ht 1.778 m (5' 10\")   Wt 62.1 kg (136 lb 14.5 oz)   SpO2 98%   BMI 19.64 kg/m²   Temp  Av.9 °F (36.6 °C)  Min: 97.5 °F (36.4 °C)  Max: 98.4 °F (36.9 °C)    Intake/Output Summary (Last 24 hours) at 2025 0743  Last data filed at 2025 0355  Gross per 24 hour   Intake 1677 ml   Output 760 ml   Net 917 ml         Physical:  Well developed, well nourished in no acute distress  Mood indicates no abnormalities. Pt doesn’t appear depressed  Orientated to time and place  Abdomen soft and nontender  No CVA tenderness  Urine in bathroom clear yellow    Labs:  WBC:    Lab Results   Component Value Date/Time    WBC 6.3 2025 05:06 AM     Hemoglobin/Hematocrit:    Lab Results   Component Value Date/Time    HGB 10.2 2025 05:06 AM    HCT 31.5 2025 05:06 AM     BMP:    Lab Results   Component Value Date/Time     2025 05:06 AM    K 4.3 2025 05:06 AM    K 3.9 2024 09:06 AM     2025 05:06 AM    CO2 25 2025 05:06 AM    BUN 18 2025 05:06 AM    CREATININE 0.6 2025 05:06 AM    CALCIUM 9.8 2025 05:06 AM    GFRAA >60 2015 04:25 PM    LABGLOM >90 2025 05:06 AM     PT/INR:    Lab Results   Component Value Date/Time    PROTIME 18.6 2025 09:21 PM    INR 1.5

## 2025-01-16 NOTE — PROGRESS NOTES
Gastroenterology Progress Note      Edward Barlow is a 74 y.o. male patient.  Principal Problem:    SBO (small bowel obstruction) (HCC)  Active Problems:    Chronic pancreatitis (HCC)    Severe malnutrition (HCC)    Abdominal distention    SHAJI (acute kidney injury) (HCC)    Lactic acidosis    Dehydration    Nausea and vomiting    Acute on chronic pancreatitis (HCC)    Neutrophilia    History of biliary stent insertion    Intestinal obstruction (HCC)  Resolved Problems:    * No resolved hospital problems. 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    Data    Data Review:    Recent Labs     25  1030 01/15/25  0845 25  0506   WBC 5.7 5.9 6.3   HGB 10.7* 10.7* 10.2*   HCT 33.7* 33.6* 31.5*   MCV 90.1 90.8 90.3    137 155     Recent Labs     25  0403 01/15/25  0359 25  0506    140 139   K 4.0 4.0 4.3    108 107   CO2 24 25 25   PHOS 2.5 2.4* 2.5   BUN 14 18 18   CREATININE 0.6* 0.6* 0.6*     Recent Labs     25  1030 01/15/25  0359   AST 37 28   ALT 26 29   BILIDIR <0.2 <0.2   BILITOT 0.3 0.3   ALKPHOS 66 69     No results for input(s): \"LIPASE\", \"AMYLASE\" in the last 72 hours.  No

## 2025-01-16 NOTE — PROGRESS NOTES
V2.0    [facility] Progress Note      Name:  Edward Barlow /Age/Sex: 1950  (74 y.o. male)   MRN & CSN:  [id number] & [id number] Encounter Date/Time: 2025 8:41 AM EST   Location:  3221/3221-01 PCP: Ellis Ordonez MD     Attending:Nicholas Griggs MD       Hospital Day: 13    Assessment and Recommendations   Edward Barlow is a 74 y.o. male with pmh of HLD, HTN, celiac disease, HTN who presents with SBO (small bowel obstruction) (HCC)    Patient was examined this morning.  Wife is at the bedside  Patient has been walking up and down the donato with good spirit  Has been having regular bowel movements.  Patient continues to be on TPN and tube feeds tolerating it well.    Labs this morning sodium 139, potassium 4.3, creatinine 0.6, magnesium 2.3, glucose 136, phosphorus 2.5, white blood cell count 6.3 H&H stable at 10.3, 31.5    Plan:   Small bowel obstruction  Resolved at this time  Currently on tube feeds,  TPN as of 2025  Plan for TPN to be stopped at discharge  GI on board recommends possible G-tube J-tube placement in 3 weeks  Encourage physical activity, such as walking      2.  Gross hematuria-resolved  Most likely due to patient being started on heparin drip for PE  Urine is now clear with no complaints  No urological interventions at this time  Patient has an appointment with urology in March which she should continue to follow-up with, or sooner if hematuria returns    3.  Sepsis  Patient met septic criteria on presentation with lactic acid,  leukocytosis  Bacteriuria versus pancreatitis  Blood culture shows no growth  Infectious disease on board was treated with meropenem until 2025      4.  SHAJI- resolved  Patient was treated with IV fluid normal saline  Creatinine 0.6, BUN 18, GFR more than 90  Will avoid nephrotoxic agents  Will monitor kidney function    5.  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 at     PHUR 7.0 01/15/2025 08:15 AM    WBCUA 0 TO 5 01/15/2025 08:15 AM    RBCUA GREATER THAN 100 01/15/2025 08:15 AM    MUCUS PRESENT 01/04/2025 06:46 PM    BACTERIA 1+ 01/04/2025 06:46 PM    CLARITYU Clear 09/25/2024 11:16 AM    LEUKOCYTESUR NEGATIVE 01/15/2025 08:15 AM    UROBILINOGEN 0.2 01/15/2025 08:15 AM    BILIRUBINUR NEGATIVE 01/15/2025 08:15 AM    BLOODU Negative 09/25/2024 11:16 AM    GLUCOSEU NEGATIVE 01/15/2025 08:15 AM    KETUA NEGATIVE 01/15/2025 08:15 AM     Urine Cultures: No results found for: \"LABURIN\"  Blood Cultures: No results found for: \"BC\"  No results found for: \"BLOODCULT2\"  Organism: No results found for: \"ORG\"      Electronically signed by Jorge Hernandez MD on 1/16/2025 at 8:41 AM  Comment: Please note this report has been produced using speech recognition software and may contain errors related to that system including errors in grammar, punctuation, and spelling, as well as words and phrases that may be inappropriate. If there are any questions or concerns, please feel free to contact the dictating provider for clarification.

## 2025-01-16 NOTE — PROGRESS NOTES
Peg tube residual checked, 250 mL residual noted. Feed rate increased to 60 mL at this time. No nausea/vomiting.

## 2025-01-16 NOTE — PROGRESS NOTES
Tube feed rate increased to 65 mL at this time, which is goal rate. No complaints of nausea/vomiting at this time.

## 2025-01-16 NOTE — PROGRESS NOTES
[facility]    Pharmacy Progress Note    TPN - Management by Pharmacy    Consult Date(s): 1/11/25  Consulting Provider(s): Lory Lees APRN-CNP    Assessment / Plan  SBO/chronic pancreatitis with partial WHALEY - Parenteral Nutrition  Day of Therapy: 5  Formulation:  Clinimix E 5/20  Clinimix Rate:  Current rate is 83 mL/hr (Total volume = 1,992 mL/day). Will decrease rate of current bag to 40 mL/hr and stop TPN at 18:00 tonight, 1/16.  Please refer to dietician note 1/13/25 for goals  Lipids:  20% 250mL over 12 hours on Mon & Thurs only (due to national shortage)  Patient will receive lipids starting on 1/23/25 (~ 10 days after TPN start)  Appreciate Clinical Dietitian's recommendations for formulation and goal rate.  Electrolytes:    Desired Amount per Day  Amount To Add to Bag Change from Previous Day in Amount per Day (Increase/Decrease)   MVI (mL) 10 10 MWF Only    Trace (mL) 1 1 MWF Only   Insulin (units) 0 0     Famotidine (mg) 0 0     Other 0 0       Maintenance IVF:  None  Glucose control:  No history of DM  BG <180. Low dose correction sliding scale insulin lispro ordered.  Add MVI 10 mL/day & Trace Elements 1 mL/day in PN on Mon-Wed-Fri only (due to national shortage)   Daily renal panel, daily magnesium, and weekly triglycerides ordered per protocol.  PN will be re-ordered daily.    Thank you for consulting pharmacy,    Saritha Shields, PharmD  [facility]   [id number]  1/16/2025   2:27 PM        Subjective/Objective:   Edward Barlow is a 74 y.o. male with a PMHx significant for recently diagnosed pulmonary embolism on Eliquis, chronic pancreatitis who is admitted with partial SBO.      Pharmacy is consulted to dose TPN.    Ht Readings from Last 1 Encounters:   01/13/25 1.778 m (5' 10\")     Wt Readings from Last 1 Encounters:   01/14/25 62.1 kg (136 lb 14.5 oz)     Recent Labs     01/15/25  0359 01/16/25  0506    139   K 4.0 4.3    107   CO2 25 25

## 2025-01-16 NOTE — PLAN OF CARE
Problem: Discharge Planning  Goal: Discharge to home or other facility with appropriate resources  1/15/2025 2044 by Ann Parrish RN  Outcome: Progressing  1/15/2025 1523 by Miguelina Bello RN  Outcome: Progressing     Problem: Skin/Tissue Integrity  Goal: Absence of new skin breakdown  Description: 1.  Monitor for areas of redness and/or skin breakdown  2.  Assess vascular access sites hourly  3.  Every 4-6 hours minimum:  Change oxygen saturation probe site  4.  Every 4-6 hours:  If on nasal continuous positive airway pressure, respiratory therapy assess nares and determine need for appliance change or resting period.  1/15/2025 2044 by Ann Parrish RN  Outcome: Progressing  1/15/2025 1523 by Miguelina Belol RN  Outcome: Progressing     Problem: ABCDS Injury Assessment  Goal: Absence of physical injury  1/15/2025 2044 by Ann Parrish RN  Outcome: Progressing  1/15/2025 1523 by Miguelina Bello RN  Outcome: Progressing     Problem: Safety - Adult  Goal: Free from fall injury  1/15/2025 2044 by Ann Parrish RN  Outcome: Progressing  1/15/2025 1523 by Miguelina Bello RN  Outcome: Progressing     Problem: Pain  Goal: Verbalizes/displays adequate comfort level or baseline comfort level  1/15/2025 2044 by Ann Parrish RN  Outcome: Progressing  1/15/2025 1523 by Miguelina Bello RN  Outcome: Progressing     Problem: Nutrition Deficit:  Goal: Optimize nutritional status  1/15/2025 2044 by Ann Parrish RN  Outcome: Progressing  1/15/2025 1523 by Miguelina Bello RN  Outcome: Progressing

## 2025-01-17 VITALS
BODY MASS INDEX: 19.6 KG/M2 | TEMPERATURE: 97.9 F | OXYGEN SATURATION: 98 % | HEIGHT: 70 IN | RESPIRATION RATE: 18 BRPM | DIASTOLIC BLOOD PRESSURE: 77 MMHG | HEART RATE: 72 BPM | WEIGHT: 136.91 LBS | SYSTOLIC BLOOD PRESSURE: 115 MMHG

## 2025-01-17 LAB
ALBUMIN SERPL-MCNC: 2.9 G/DL (ref 3.4–5)
ALP SERPL-CCNC: 70 U/L (ref 40–129)
ALT SERPL-CCNC: 53 U/L (ref 10–40)
ANION GAP SERPL CALCULATED.3IONS-SCNC: 9 MMOL/L (ref 3–16)
AST SERPL-CCNC: 52 U/L (ref 15–37)
BILIRUB DIRECT SERPL-MCNC: <0.2 MG/DL (ref 0–0.3)
BILIRUB INDIRECT SERPL-MCNC: ABNORMAL MG/DL (ref 0–1)
BILIRUB SERPL-MCNC: 0.3 MG/DL (ref 0–1)
BUN SERPL-MCNC: 16 MG/DL (ref 7–20)
CALCIUM SERPL-MCNC: 9.5 MG/DL (ref 8.3–10.6)
CHLORIDE SERPL-SCNC: 107 MMOL/L (ref 99–110)
CO2 SERPL-SCNC: 24 MMOL/L (ref 21–32)
CREAT SERPL-MCNC: 0.6 MG/DL (ref 0.8–1.3)
ERYTHROCYTE [DISTWIDTH] IN BLOOD BY AUTOMATED COUNT: 17.4 % (ref 12.4–15.4)
GFR, ESTIMATED: >90 ML/MIN/1.73M2
GLUCOSE BLD-MCNC: 100 MG/DL (ref 70–99)
GLUCOSE SERPL-MCNC: 115 MG/DL (ref 70–99)
HCT VFR BLD AUTO: 31.7 % (ref 40.5–52.5)
HGB BLD-MCNC: 10.3 G/DL (ref 13.5–17.5)
MAGNESIUM SERPL-MCNC: 2.2 MG/DL (ref 1.8–2.4)
MCH RBC QN AUTO: 29.5 PG (ref 26–34)
MCHC RBC AUTO-ENTMCNC: 32.5 G/DL (ref 31–36)
MCV RBC AUTO: 90.8 FL (ref 80–100)
PARTIAL THROMBOPLASTIN TIME: 30.1 SEC (ref 22.1–36.4)
PHOSPHATE SERPL-MCNC: 2.3 MG/DL (ref 2.5–4.9)
PLATELET # BLD AUTO: 155 K/UL (ref 135–450)
PMV BLD AUTO: 11.1 FL
POTASSIUM SERPL-SCNC: 4.5 MMOL/L (ref 3.5–5.1)
PROT SERPL-MCNC: 6.2 G/DL (ref 6.4–8.2)
RBC # BLD AUTO: 3.49 M/UL (ref 4.2–5.9)
SODIUM SERPL-SCNC: 141 MMOL/L (ref 136–145)
WBC OTHER # BLD: 6 K/UL (ref 4–11)

## 2025-01-17 PROCEDURE — 82248 BILIRUBIN DIRECT: CPT

## 2025-01-17 PROCEDURE — 80053 COMPREHEN METABOLIC PANEL: CPT

## 2025-01-17 PROCEDURE — 6360000002 HC RX W HCPCS: Performed by: NURSE PRACTITIONER

## 2025-01-17 PROCEDURE — 84100 ASSAY OF PHOSPHORUS: CPT

## 2025-01-17 PROCEDURE — 6360000002 HC RX W HCPCS: Performed by: STUDENT IN AN ORGANIZED HEALTH CARE EDUCATION/TRAINING PROGRAM

## 2025-01-17 PROCEDURE — 6370000000 HC RX 637 (ALT 250 FOR IP): Performed by: NURSE PRACTITIONER

## 2025-01-17 PROCEDURE — 2500000003 HC RX 250 WO HCPCS: Performed by: STUDENT IN AN ORGANIZED HEALTH CARE EDUCATION/TRAINING PROGRAM

## 2025-01-17 PROCEDURE — 85730 THROMBOPLASTIN TIME PARTIAL: CPT

## 2025-01-17 PROCEDURE — 2500000003 HC RX 250 WO HCPCS: Performed by: INTERNAL MEDICINE

## 2025-01-17 PROCEDURE — 36415 COLL VENOUS BLD VENIPUNCTURE: CPT

## 2025-01-17 PROCEDURE — 2580000003 HC RX 258: Performed by: STUDENT IN AN ORGANIZED HEALTH CARE EDUCATION/TRAINING PROGRAM

## 2025-01-17 PROCEDURE — 82962 GLUCOSE BLOOD TEST: CPT

## 2025-01-17 PROCEDURE — 85027 COMPLETE CBC AUTOMATED: CPT

## 2025-01-17 PROCEDURE — 83735 ASSAY OF MAGNESIUM: CPT

## 2025-01-17 RX ORDER — ONDANSETRON 4 MG/1
4 TABLET, ORALLY DISINTEGRATING ORAL EVERY 8 HOURS PRN
Qty: 20 TABLET | Refills: 3 | Status: SHIPPED | OUTPATIENT
Start: 2025-01-17

## 2025-01-17 RX ORDER — METOCLOPRAMIDE HYDROCHLORIDE 5 MG/5ML
10 SOLUTION ORAL 2 TIMES DAILY
Qty: 750 ML | Refills: 3 | Status: SHIPPED | OUTPATIENT
Start: 2025-01-17

## 2025-01-17 RX ORDER — PANTOPRAZOLE SODIUM 40 MG/1
40 TABLET, DELAYED RELEASE ORAL 2 TIMES DAILY
Qty: 30 TABLET | Refills: 3 | Status: SHIPPED | OUTPATIENT
Start: 2025-01-17

## 2025-01-17 RX ORDER — METOCLOPRAMIDE 5 MG/1
10 TABLET ORAL 2 TIMES DAILY PRN
Status: DISCONTINUED | OUTPATIENT
Start: 2025-01-17 | End: 2025-01-17 | Stop reason: HOSPADM

## 2025-01-17 RX ORDER — METOCLOPRAMIDE 10 MG/1
10 TABLET ORAL 2 TIMES DAILY
Qty: 120 TABLET | Refills: 3 | Status: SHIPPED | OUTPATIENT
Start: 2025-01-17

## 2025-01-17 RX ADMIN — METOCLOPRAMIDE 10 MG: 5 INJECTION, SOLUTION INTRAMUSCULAR; INTRAVENOUS at 08:52

## 2025-01-17 RX ADMIN — SODIUM CHLORIDE, PRESERVATIVE FREE 10 ML: 5 INJECTION INTRAVENOUS at 08:53

## 2025-01-17 RX ADMIN — APIXABAN 5 MG: 5 TABLET, FILM COATED ORAL at 08:52

## 2025-01-17 RX ADMIN — Medication 40 MG: at 08:52

## 2025-01-17 NOTE — CONSULTS
Comprehensive Nutrition Assessment    Type and Reason for Visit:  Consult/Follow up    Nutrition Recommendations/Plan:   Diet: NPO  ONS: NPO  Nutrition support: Jevity 1.5 @ 65 mL/hr. Flush 45 mL q 6 hrs.  Monitor: wt trend, TF tolerance, oral diet advancement       Malnutrition Assessment:  Malnutrition Status:  Severe malnutrition (01/06/25 1008)    Context:  Chronic Illness     Findings of the 6 clinical characteristics of malnutrition:  Energy Intake:  75% or less estimated energy requirements for 1 month or longer  Weight Loss:  5% over 1 month     Body Fat Loss:  Unable to assess     Muscle Mass Loss:  Unable to assess    Fluid Accumulation:  No fluid accumulation     Strength:  Not Performed    Nutrition Assessment:    Follow-up/consult \"diet educaiton-protein sources\". Pt tolerating TF, Jevity 1.5 @ 65 ml/hr. TPN D/C'd. Discussed with wife regarding nutrition going home (TF vs oral diet vs ONS, etc). Provided wife packet regarding protein sources once oral diet is advanced. D/C planning underway, hopeful to  D/C today.    Nutrition Related Findings:    BM 1/15; Phos 2.3, Glu 100 Wound Type: Pressure Injury (buttock)       Current Nutrition Intake & Therapies:    Average Meal Intake: NPO  Average Supplements Intake: NPO  Diet NPO Exceptions are: Ice Chips, Popsicles  ADULT TUBE FEEDING; PEG; Standard with Fiber; Continuous; 10; Yes; 10; Other (specify); advance every 2 hours; 65; 45; Q 6 hours  Current Tube Feeding (TF) Orders:  Feeding Route: PEG  Formula: Standard with Fiber  Schedule: Continuous  Feeding Regimen: @ 65 ml/hr  Additives/Modulars: None  Water Flushes: 30 q 6 hrs  Current TF Provides: 1300 mL TV, 1950 kcal, 83gm pro, 988 mL free water    Anthropometric Measures:  Height: 177.8 cm (5' 10\")  Ideal Body Weight (IBW): 166 lbs (75 kg)       Current Body Weight: 62.1 kg (136 lb 14.5 oz), 82.5 % IBW. Weight Source: Standing scale  Current BMI (kg/m2): 19.6  Usual Body Weight: 69.9 kg (154 lb)      % Weight Change (Calculated): -7.1                    BMI Categories: Underweight (BMI less than 22) age over 65    Estimated Daily Nutrient Needs:  Energy Requirements Based On: Kcal/kg  Weight Used for Energy Requirements: Current  Energy (kcal/day): 4928-1660 kcal (28-30 kcal/kg 65 kg CBW)  Weight Used for Protein Requirements: Current  Protein (g/day):  gm (1.4-2gm/kg 65 kg CBW)  Method Used for Fluid Requirements: 1 ml/kcal  Fluid (ml/day): 7352-1465 mL    Nutrition Diagnosis:   Severe malnutrition, in context of chronic illness related to altered GI function as evidenced by criteria as identified in malnutrition assessment, GI abnormality, nausea, vomiting    Nutrition Interventions:   Food and/or Nutrient Delivery: Continue Current Tube Feeding  Nutrition Education/Counseling: Education/Counseling not indicated  Coordination of Nutrition Care: Continue to monitor while inpatient       Goals:  Goals: Tolerate nutrition support at goal rate, prior to discharge  Type of Goal: Continue current goal  Previous Goal Met: Goal(s) Achieved    Nutrition Monitoring and Evaluation:      Food/Nutrient Intake Outcomes: Diet Advancement/Tolerance, Enteral Nutrition Intake/Tolerance  Physical Signs/Symptoms Outcomes: Biochemical Data, Weight, Skin, Nutrition Focused Physical Findings, GI Status, Nausea or Vomiting    Discharge Planning:    Enteral Nutrition     Leola Hernandez RD, LD  Contact: Bessy Smart Dietitian  Leola Hernandez RD, LD: [phone]

## 2025-01-17 NOTE — PLAN OF CARE
Problem: Discharge Planning  Goal: Discharge to home or other facility with appropriate resources  1/16/2025 2111 by Lisa Ludwig, RN  Outcome: Progressing

## 2025-01-17 NOTE — PROGRESS NOTES
Patient discharged to home. Discharge instructions and orders provided to patient and spouse. PICC line removed completely, pressure, gauze and tape applied. Patient transported by wheelchair to lobby and assisted into vehicle.

## 2025-01-17 NOTE — FLOWSHEET NOTE
01/16/25 2100   Assessment   Charting Type Shift assessment   Psychosocial   Psychosocial (WDL) X   Patient Behaviors Flat affect   Neurological   Neuro (WDL) WDL   Level of Consciousness 0   Orientation Level Oriented X4   Cognition Appropriate judgement;Appropriate attention/concentration;Follows commands   Speech Clear   Hamida Coma Scale   Eye Opening 4   Best Verbal Response 5   Best Motor Response 6   Hamida Coma Scale Score 15   HEENT (Head, Ears, Eyes, Nose, & Throat)   HEENT (WDL) X   Right Eye Light sensitivity;Glasses   Left Eye Glasses;Light sensitivity   Right Ear Impaired hearing   Left Ear Impaired hearing   Respiratory   Respiratory (WDL) WDL   Subcutaneous Air/Crepitus None   Respiratory Pattern Regular   Respiratory Depth Normal   Respiratory Quality/Effort Unlabored   Chest Assessment Chest expansion symmetrical;Trachea midline   L Breath Sounds Diminished   R Breath Sounds Diminished   Level of Activity/Mobility 0   Cardiac   Cardiac (WDL) WDL   Cardiac Rhythm Sinus rhythm   Cardiac Monitor   Telemetry Box Number [id number]   Alarm Audible Yes   Pacemaker   Pacemaker No   Gastrointestinal   Abdominal (WDL) X   RUQ Bowel Sounds Active   LUQ Bowel Sounds Active   RLQ Bowel Sounds Active   LLQ Bowel Sounds Active   Abdomen Inspection Soft   Tenderness Nontender   Gastrostomy/Enterostomy/Jejunostomy Tube Percutaneous Endoscopic Gastrostomy (PEG) LUQ 20 fr   Placement Date/Time: 01/08/25 1159   Inserted by: Dr. brian  Type: (c) Percutaneous Endoscopic Gastrostomy (PEG)  Location: LUQ  Tube Size (fr): 20 fr   Drainage Appearance None   Site Description Dry;Intact   G Port Status Infusing   Surrounding Skin Dry;Intact   Dressing Status Clean, dry & intact   Dressing Type Open to air   G-Tube Care Completed Yes   Tube Feeding Standard without Fiber   Genitourinary   Genitourinary (WDL) X   Urine Assessment   Urine Color Yellow/straw   Urine Appearance Clear   Urine Odor No odor   Peripheral Vascular  No - the patient is unable to be screened due to medical condition

## 2025-01-17 NOTE — DISCHARGE SUMMARY
Hospital Medicine Discharge Summary    Patient ID: Edward Barlow      Patient's PCP: Ellis Ordonez MD    Admit Date: 1/4/2025     Discharge Date: 1/17/2025      Admitting Physician: Faith Dominguez MD     Discharge Physician: HAROON Rodriguez - CNP     Discharge Diagnoses  Small bowel obstruction, resolved  High tube feed residuals, resolved  Sepsis, resolved  Hematuria, resolved  SHAJI, resolved  Acute on chronic pancreatitis with necrosis, improved  Pulmonary embolism  Recent community-acquired pneumonia  Medical history also includes class III malnutrition, hypertension, TIA and constipation.       Hospital Course: Edward Barlow is a 74 y.o. male with past medical history of recently diagnosed pulmonary embolism on Eliquis, chronic pancreatitis status post NG tube placement, recent community-acquired pneumonia class III malnutrition, celiac disease, hypertension, hyperlipidemia, history of TIA and bladder cancer. He presented to the emergency department on account of nausea and vomiting of 1 day duration. He had an NG tube placed 12/9/2024 but this was discontinued 1/3/25-- it was blocked after his diet has been expanded under GI oversight. Patient denies any other symptoms. Admitted for further workup and treatment. Found to have SBO on admission. See details below.     Small bowel obstruction, resolved  High tube feed residuals, resolved  -noted on imaging and transverse duodenum with an additional transition point + dilatation in the right abdomen.   -1/8/2025 PEG tube placed  -Having bowel movements.  continue MiraLAX  -Patient tolerating tube feeds now at 65 mL an hour.  Review of tube feeds and treatment with wife and patient.  -Continue Reglan 10 mg p.o. twice daily  -GI following, recommend up and ambulating frequently as this will help with residuals to. recommends possible G-tube J-tube placement in 3 weeks   -Follow-up with GI in 2 weeks  -Tube feed pump and supplies arranged with social work  01/17/2025 04:27 AM    PHOS 2.3 01/17/2025 04:27 AM         Significant Diagnostic Studies    Radiology:   CT UROGRAM   Final Result   1.  Small nonobstructing right renal calculi.   2.  Resolved ileus.   3.  Unchanged appearance of the pancreas with ill-defined stranding adjacent to the pancreatic head and mass like soft tissue extending inferiorly from the enhancement process. Correlate for neoplasm.   4.  Improved bilateral lower lobe airspace disease.   5.  Unchanged thickening of the gastric antrum which could be reactive.   6.  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      Electronically signed by Armen Huynh MD      CT ABDOMEN PELVIS W IV CONTRAST Additional Contrast? None   Final Result      1. Gastric antral thickening likely representing antritis.   2. Associated poor definition of pancreas in the region of the head and proximal   body that may represent secondary pancreatitis.   3. Biliary stent noted in satisfactory location.   4. Diffuse small intestinal distention of moderate severity favoring ileus. No   transition zone visualized.      Electronically signed by Farshad Mendiola      XR ABDOMEN (KUB) (SINGLE AP VIEW)   Final Result      Improvement in the number of centrally dilated loops of small bowel. Biliary stent is again noted.      Electronically signed by Marcello Prasad MD      XR CHEST PORTABLE   Final Result      1.  Nasogastric tube tip projects at the gastric body.      Electronically signed by Paul Rg      CT ABDOMEN PELVIS W IV CONTRAST Additional Contrast? None   Final Result      1.  Evolving pancreatitis with decreased peripancreatic fluid collections and also overall slightly decreased peripancreatic inflammatory soft tissue attenuation compared to CT from 12/26/2024. However, there is interval increased gastric distention, and    soft tissue thickening results in narrowing of the transverse duodenum concerning for

## 2025-01-17 NOTE — PLAN OF CARE
Problem: Discharge Planning  Goal: Discharge to home or other facility with appropriate resources  Outcome: Adequate for Discharge     Problem: Skin/Tissue Integrity  Goal: Absence of new skin breakdown  Description: 1.  Monitor for areas of redness and/or skin breakdown  2.  Assess vascular access sites hourly  3.  Every 4-6 hours minimum:  Change oxygen saturation probe site  4.  Every 4-6 hours:  If on nasal continuous positive airway pressure, respiratory therapy assess nares and determine need for appliance change or resting period.  Outcome: Adequate for Discharge     Problem: ABCDS Injury Assessment  Goal: Absence of physical injury  Outcome: Adequate for Discharge     Problem: Pain  Goal: Verbalizes/displays adequate comfort level or baseline comfort level  Outcome: Adequate for Discharge     Problem: Nutrition Deficit:  Goal: Optimize nutritional status  Outcome: Adequate for Discharge

## 2025-01-17 NOTE — WOUND CARE
Wound care consult received.     Pt has a partial thickness wound noted to the upper right buttock. The left upper buttock also appears to have some shearing to the skin.     Recommend:  Reji upper buttocks  Cleanse with normal saline or wound cleanser, gently pat dry.  Apply non-sting barrier around wound.   Apply a thin layer of hydrogel BID to the wound bed only and cover with non-adherent.   Apply sacral heart Optifoam to cover wound.   Change sacral dressing daily and prn for soiling.     Spouse at bedside, discussed wound care management plan. All questions answered.    Please contact wound care for questions/concerns or deterioration. Thank you.   
Wound care:  Pt was being followed by wound care for ulcer to buttocks secondary to diarrhea. Wound has healed and resolved. Photo uploaded to media. Reviewed with patient and spouse preventative measures to protect the skin. Spouse verbalized understanding and is the primary caregiver. All questions were answered.   
patients partner. Time spent obtaining history, direct pt care, coordinating care, reviewing labs, imaging results.

## 2025-01-17 NOTE — CARE COORDINATION
Discharge Note     Discharge order received.      Destination: Home     Discharging to Facility/ Agency      Name: [facility]   Address: [address], [place] [address]   Phone: [phone]      [facility]  [address] [place], [place] [id number] [phone]         Transportation: Family     All case management needs met.        Comment: Pt d/c home in care of family. Family to transport. Spoke with pt and spouse to convey above info

## 2025-01-17 NOTE — DISCHARGE INSTRUCTIONS
Follow up with PCP in 1 week  Follow up with GI in 2 weeks    If you eat a snack like a cup of applesauce take one creon capsule, if you eat more food than that, take 2 creon capsules    Advance diet slowly with soft foods.  Avoid dairy for a week, as it can be difficulty to digest.      Take miralax daily, but if your stools are liquid, hold it for a day and re-evaluate.     May break from tube feed for up to 4 hours per day.     For Tube feed: If patient has signs or symptoms of GI intolerance, i.e. abdominal pain or distention, nausea or vomiting, stop tube feed and check residual. If residual is greater than 500 mL then discard the residual, hold the tube feeding, and notify the provider.  If less than 500ml, re instill residual, and hold tube feed for 2 hours and resume if symptoms have resolved at same rate.     Goal glucose is less than 180, check glucose daily   May check blood pressure and heart rate daily       
0.18

## 2025-02-06 PROBLEM — E86.0 DEHYDRATION: Status: RESOLVED | Noted: 2025-01-07 | Resolved: 2025-02-06

## 2025-03-10 ENCOUNTER — HOSPITAL ENCOUNTER (OUTPATIENT)
Dept: CT IMAGING | Age: 75
Discharge: HOME | End: 2025-03-10
Attending: INTERNAL MEDICINE
Payer: MEDICARE

## 2025-03-10 DIAGNOSIS — K85.90 ACUTE PANCREATITIS WITHOUT INFECTION OR NECROSIS, UNSPECIFIED PANCREATITIS TYPE: ICD-10-CM

## 2025-03-10 LAB
PERFORMED ON: ABNORMAL
POC CREATININE: 0.7 MG/DL (ref 0.8–1.3)
POC SAMPLE TYPE: ABNORMAL

## 2025-03-10 PROCEDURE — 6360000004 HC RX CONTRAST MEDICATION: Performed by: INTERNAL MEDICINE

## 2025-03-10 PROCEDURE — 82565 ASSAY OF CREATININE: CPT

## 2025-03-10 PROCEDURE — 74177 CT ABD & PELVIS W/CONTRAST: CPT

## 2025-03-10 RX ORDER — IOPAMIDOL 755 MG/ML
75 INJECTION, SOLUTION INTRAVASCULAR
Status: COMPLETED | OUTPATIENT
Start: 2025-03-10 | End: 2025-03-10

## 2025-03-10 RX ADMIN — IOPAMIDOL 75 ML: 755 INJECTION, SOLUTION INTRAVENOUS at 09:53

## 2025-04-04 RX ORDER — PANCRELIPASE 36000; 180000; 114000 [USP'U]/1; [USP'U]/1; [USP'U]/1
CAPSULE, DELAYED RELEASE PELLETS ORAL
COMMUNITY
Start: 2025-01-31

## 2025-04-04 RX ORDER — POLYETHYLENE GLYCOL 3350 17 G/17G
17 POWDER, FOR SOLUTION ORAL DAILY
COMMUNITY
Start: 2025-01-23

## 2025-04-04 NOTE — PROGRESS NOTES
[facility] PRE-OPERATIVE INSTRUCTIONS       DOS: __4/16/25__        Pre-Op Instructions     Patients receiving local anesthetic only will arrive one hour prior to the procedure, all other patients will arrive 1.5 hours prior to procedure time.    [x]  A History and Physical will be required within 30 days prior to surgery date. Some patients may require cardiac or pulmonary clearance. H&P will be completed DOS for Endo/colonoscopy patients.     [x]  Reviewed Medical and Surgical history, medication list, confirmed with patient any implants, allergies, bleeding disorders, DANO and reactions to Anesthesia.    [x]  If there is a change in physical condition between now and the day of surgery, please notify your surgeon. This includes a cough, cold, fever, sore throat, nausea, vomiting and diarrhea. Also notify your surgeon if you experience dizziness, shortness of breath or blurred vision.    [x] Reviewed hx of C-Diff, MRSA, VRE and/or recent use of Antibiotics     [x]  All patients having a procedure must have a ride home by a responsible person that is over the age of 18 and ensure it is someone that we can share medical information with. After discharge, a responsible adult needs to stay with you for 24 hours. There is a limit of 2 adult visitors per room.     If unable to secure ride and/or care taker, please contact surgeon's office.      [x]  No alcohol, smoking or marijuana use 24 hours prior to surgery. Any use of recreational drugs must be stopped 5 days prior to surgery.     [x]  NPO after midnight (Any heart, BP, seizure, thyroid and breathing medications are okay to take the morning of surgery with a small sip of water 4 hours prior to procedure).    The morning of surgery, you may brush your teeth, just no swallowing water. Also, NO gum, candy, mints or ice chips.    []  For Colonoscopy's, follow prep-instructions as indicated by physician.     []  Patients with a insulin pump, keep set on

## 2025-04-15 ENCOUNTER — ANESTHESIA EVENT (OUTPATIENT)
Age: 75
End: 2025-04-15
Payer: MEDICARE

## 2025-04-16 ENCOUNTER — HOSPITAL ENCOUNTER (OUTPATIENT)
Age: 75
Setting detail: OUTPATIENT SURGERY
Discharge: HOME | End: 2025-04-16
Attending: INTERNAL MEDICINE | Admitting: INTERNAL MEDICINE
Payer: MEDICARE

## 2025-04-16 ENCOUNTER — ANESTHESIA (OUTPATIENT)
Age: 75
End: 2025-04-16
Payer: MEDICARE

## 2025-04-16 ENCOUNTER — APPOINTMENT (OUTPATIENT)
Age: 75
End: 2025-04-16
Attending: INTERNAL MEDICINE
Payer: MEDICARE

## 2025-04-16 VITALS
WEIGHT: 155 LBS | DIASTOLIC BLOOD PRESSURE: 88 MMHG | TEMPERATURE: 98 F | HEART RATE: 67 BPM | OXYGEN SATURATION: 98 % | SYSTOLIC BLOOD PRESSURE: 159 MMHG | BODY MASS INDEX: 22.19 KG/M2 | RESPIRATION RATE: 20 BRPM | HEIGHT: 70 IN

## 2025-04-16 DIAGNOSIS — K86.1 OTHER CHRONIC PANCREATITIS: Primary | ICD-10-CM

## 2025-04-16 PROCEDURE — 6370000000 HC RX 637 (ALT 250 FOR IP): Performed by: INTERNAL MEDICINE

## 2025-04-16 PROCEDURE — 3700000000 HC ANESTHESIA ATTENDED CARE: Performed by: INTERNAL MEDICINE

## 2025-04-16 PROCEDURE — 6360000004 HC RX CONTRAST MEDICATION

## 2025-04-16 PROCEDURE — 7100000000 HC PACU RECOVERY - FIRST 15 MIN: Performed by: INTERNAL MEDICINE

## 2025-04-16 PROCEDURE — 74330 X-RAY BILE/PANC ENDOSCOPY: CPT

## 2025-04-16 PROCEDURE — 7100000010 HC PHASE II RECOVERY - FIRST 15 MIN: Performed by: INTERNAL MEDICINE

## 2025-04-16 PROCEDURE — 6360000002 HC RX W HCPCS: Performed by: INTERNAL MEDICINE

## 2025-04-16 PROCEDURE — C2625 STENT, NON-COR, TEM W/DEL SY: HCPCS | Performed by: INTERNAL MEDICINE

## 2025-04-16 PROCEDURE — 2500000003 HC RX 250 WO HCPCS: Performed by: NURSE ANESTHETIST, CERTIFIED REGISTERED

## 2025-04-16 PROCEDURE — 2709999900 HC NON-CHARGEABLE SUPPLY: Performed by: INTERNAL MEDICINE

## 2025-04-16 PROCEDURE — 2720000010 HC SURG SUPPLY STERILE: Performed by: INTERNAL MEDICINE

## 2025-04-16 PROCEDURE — 6360000004 HC RX CONTRAST MEDICATION: Performed by: INTERNAL MEDICINE

## 2025-04-16 PROCEDURE — 3609015200 HC ERCP REMOVE CALCULI/DEBRIS BILIARY/PANCREAS DUCT: Performed by: INTERNAL MEDICINE

## 2025-04-16 PROCEDURE — 2580000003 HC RX 258: Performed by: ANESTHESIOLOGY

## 2025-04-16 PROCEDURE — C1769 GUIDE WIRE: HCPCS | Performed by: INTERNAL MEDICINE

## 2025-04-16 PROCEDURE — 6360000002 HC RX W HCPCS: Performed by: NURSE ANESTHETIST, CERTIFIED REGISTERED

## 2025-04-16 PROCEDURE — 3609015000 HC ERCP REMOVE FOREIGN BODY/STENT BILIARY/PANC DUCT: Performed by: INTERNAL MEDICINE

## 2025-04-16 PROCEDURE — 7100000001 HC PACU RECOVERY - ADDTL 15 MIN: Performed by: INTERNAL MEDICINE

## 2025-04-16 PROCEDURE — 3609015100 HC ERCP STENT PLACEMENT BILIARY/PANCREATIC DUCT: Performed by: INTERNAL MEDICINE

## 2025-04-16 PROCEDURE — 3700000001 HC ADD 15 MINUTES (ANESTHESIA): Performed by: INTERNAL MEDICINE

## 2025-04-16 PROCEDURE — 7100000011 HC PHASE II RECOVERY - ADDTL 15 MIN: Performed by: INTERNAL MEDICINE

## 2025-04-16 DEVICE — OASIS ONE ACTION STENT INTRODUCTION SYSTEM, WITH PRE-LOADED COTTON LEUNG BILIARY STENT
Type: IMPLANTABLE DEVICE | Status: FUNCTIONAL
Brand: OASIS

## 2025-04-16 RX ORDER — SODIUM CHLORIDE 0.9 % (FLUSH) 0.9 %
5-40 SYRINGE (ML) INJECTION PRN
Status: DISCONTINUED | OUTPATIENT
Start: 2025-04-16 | End: 2025-04-16 | Stop reason: HOSPADM

## 2025-04-16 RX ORDER — NALOXONE HYDROCHLORIDE 0.4 MG/ML
INJECTION, SOLUTION INTRAMUSCULAR; INTRAVENOUS; SUBCUTANEOUS PRN
Status: DISCONTINUED | OUTPATIENT
Start: 2025-04-16 | End: 2025-04-16 | Stop reason: HOSPADM

## 2025-04-16 RX ORDER — FENTANYL CITRATE 50 UG/ML
INJECTION, SOLUTION INTRAMUSCULAR; INTRAVENOUS
Status: DISCONTINUED | OUTPATIENT
Start: 2025-04-16 | End: 2025-04-16 | Stop reason: SDUPTHER

## 2025-04-16 RX ORDER — ROCURONIUM BROMIDE 10 MG/ML
INJECTION, SOLUTION INTRAVENOUS
Status: DISCONTINUED | OUTPATIENT
Start: 2025-04-16 | End: 2025-04-16 | Stop reason: SDUPTHER

## 2025-04-16 RX ORDER — SODIUM CHLORIDE 9 MG/ML
INJECTION, SOLUTION INTRAVENOUS PRN
Status: DISCONTINUED | OUTPATIENT
Start: 2025-04-16 | End: 2025-04-16 | Stop reason: HOSPADM

## 2025-04-16 RX ORDER — OXYCODONE AND ACETAMINOPHEN 5; 325 MG/1; MG/1
1 TABLET ORAL EVERY 8 HOURS PRN
Qty: 21 TABLET | Refills: 0 | Status: SHIPPED | OUTPATIENT
Start: 2025-04-16 | End: 2025-04-23

## 2025-04-16 RX ORDER — MEPERIDINE HYDROCHLORIDE 25 MG/ML
12.5 INJECTION INTRAMUSCULAR; INTRAVENOUS; SUBCUTANEOUS
Status: DISCONTINUED | OUTPATIENT
Start: 2025-04-16 | End: 2025-04-16 | Stop reason: HOSPADM

## 2025-04-16 RX ORDER — LIDOCAINE HYDROCHLORIDE 20 MG/ML
INJECTION, SOLUTION EPIDURAL; INFILTRATION; INTRACAUDAL; PERINEURAL
Status: DISCONTINUED | OUTPATIENT
Start: 2025-04-16 | End: 2025-04-16 | Stop reason: SDUPTHER

## 2025-04-16 RX ORDER — SODIUM CHLORIDE 0.9 % (FLUSH) 0.9 %
5-40 SYRINGE (ML) INJECTION EVERY 12 HOURS SCHEDULED
Status: DISCONTINUED | OUTPATIENT
Start: 2025-04-16 | End: 2025-04-16 | Stop reason: HOSPADM

## 2025-04-16 RX ORDER — FENTANYL CITRATE 50 UG/ML
25 INJECTION, SOLUTION INTRAMUSCULAR; INTRAVENOUS EVERY 5 MIN PRN
Status: DISCONTINUED | OUTPATIENT
Start: 2025-04-16 | End: 2025-04-16 | Stop reason: HOSPADM

## 2025-04-16 RX ORDER — ONDANSETRON 2 MG/ML
4 INJECTION INTRAMUSCULAR; INTRAVENOUS
Status: DISCONTINUED | OUTPATIENT
Start: 2025-04-16 | End: 2025-04-16 | Stop reason: HOSPADM

## 2025-04-16 RX ORDER — IOPAMIDOL 755 MG/ML
INJECTION, SOLUTION INTRAVASCULAR
Status: COMPLETED
Start: 2025-04-16 | End: 2025-04-16

## 2025-04-16 RX ORDER — INDOMETHACIN 100 MG
100 SUPPOSITORY, RECTAL RECTAL ONCE
Status: COMPLETED | OUTPATIENT
Start: 2025-04-16 | End: 2025-04-16

## 2025-04-16 RX ORDER — DEXAMETHASONE SODIUM PHOSPHATE 4 MG/ML
INJECTION, SOLUTION INTRA-ARTICULAR; INTRALESIONAL; INTRAMUSCULAR; INTRAVENOUS; SOFT TISSUE
Status: DISCONTINUED | OUTPATIENT
Start: 2025-04-16 | End: 2025-04-16 | Stop reason: SDUPTHER

## 2025-04-16 RX ORDER — IOPAMIDOL 755 MG/ML
100 INJECTION, SOLUTION INTRAVASCULAR ONCE
Status: COMPLETED | OUTPATIENT
Start: 2025-04-16 | End: 2025-04-16

## 2025-04-16 RX ORDER — PROPOFOL 10 MG/ML
INJECTION, EMULSION INTRAVENOUS
Status: DISCONTINUED | OUTPATIENT
Start: 2025-04-16 | End: 2025-04-16 | Stop reason: SDUPTHER

## 2025-04-16 RX ORDER — CIPROFLOXACIN 2 MG/ML
400 INJECTION, SOLUTION INTRAVENOUS ONCE
Status: COMPLETED | OUTPATIENT
Start: 2025-04-16 | End: 2025-04-16

## 2025-04-16 RX ORDER — SODIUM CHLORIDE 9 MG/ML
INJECTION, SOLUTION INTRAVENOUS PRN
Status: DISCONTINUED | OUTPATIENT
Start: 2025-04-16 | End: 2025-04-16

## 2025-04-16 RX ORDER — FENTANYL CITRATE 50 UG/ML
50 INJECTION, SOLUTION INTRAMUSCULAR; INTRAVENOUS EVERY 5 MIN PRN
Status: DISCONTINUED | OUTPATIENT
Start: 2025-04-16 | End: 2025-04-16 | Stop reason: HOSPADM

## 2025-04-16 RX ORDER — ONDANSETRON 2 MG/ML
INJECTION INTRAMUSCULAR; INTRAVENOUS
Status: DISCONTINUED | OUTPATIENT
Start: 2025-04-16 | End: 2025-04-16 | Stop reason: SDUPTHER

## 2025-04-16 RX ADMIN — ONDANSETRON 4 MG: 2 INJECTION INTRAMUSCULAR; INTRAVENOUS at 09:58

## 2025-04-16 RX ADMIN — PROPOFOL 150 MG: 10 INJECTION, EMULSION INTRAVENOUS at 09:49

## 2025-04-16 RX ADMIN — LIDOCAINE HYDROCHLORIDE 100 MG: 20 INJECTION, SOLUTION EPIDURAL; INFILTRATION; INTRACAUDAL; PERINEURAL at 09:49

## 2025-04-16 RX ADMIN — IOPAMIDOL 7 ML: 755 INJECTION, SOLUTION INTRAVENOUS at 10:23

## 2025-04-16 RX ADMIN — IOPAMIDOL 100 ML: 755 INJECTION, SOLUTION INTRAVENOUS at 09:57

## 2025-04-16 RX ADMIN — CIPROFLOXACIN 400 MG: 400 INJECTION, SOLUTION INTRAVENOUS at 09:57

## 2025-04-16 RX ADMIN — Medication 100 MG: at 09:58

## 2025-04-16 RX ADMIN — FENTANYL CITRATE 50 MCG: 50 INJECTION, SOLUTION INTRAMUSCULAR; INTRAVENOUS at 09:49

## 2025-04-16 RX ADMIN — DEXAMETHASONE SODIUM PHOSPHATE 4 MG: 4 INJECTION, SOLUTION INTRAMUSCULAR; INTRAVENOUS at 09:57

## 2025-04-16 RX ADMIN — SODIUM CHLORIDE: 0.9 INJECTION, SOLUTION INTRAVENOUS at 08:05

## 2025-04-16 RX ADMIN — SUGAMMADEX 200 MG: 100 INJECTION, SOLUTION INTRAVENOUS at 10:24

## 2025-04-16 RX ADMIN — ROCURONIUM BROMIDE 40 MG: 10 INJECTION INTRAVENOUS at 09:49

## 2025-04-16 ASSESSMENT — PAIN SCALES - GENERAL
PAINLEVEL_OUTOF10: 0
PAINLEVEL_OUTOF10: 0

## 2025-04-16 NOTE — ANESTHESIA PRE PROCEDURE
Department of Anesthesiology  Preprocedure Note       Name:  Edward Barlow   Age:  74 y.o.  :  1950                                          MRN:  [id number]         Date:  2025      Surgeon: Surgeon(s):  Tom Dias MD    Procedure: Procedure(s):  ENDOSCOPIC RETROGRADE CHOLANGIOPANCREATOGRAPHY    Medications prior to admission:   Prior to Admission medications    Medication Sig Start Date End Date Taking? Authorizing Provider   CREON 75771-796901 units CPEP delayed release capsule Take by mouth 3 times daily (with meals) 25  Yes Benny Sheriff MD   polyethylene glycol (GLYCOLAX) 17 GM/SCOOP powder Take 17 g by mouth daily 25  Yes Benny Sheriff MD   metoclopramide (REGLAN) 10 MG tablet Take 1 tablet by mouth in the morning and at bedtime 25  Yes Lory Lees APRN - CNP   ondansetron (ZOFRAN-ODT) 4 MG disintegrating tablet Take 1 tablet by mouth every 8 hours as needed for Nausea or Vomiting 25  Yes Lory Lees APRN - CNP   pantoprazole (PROTONIX) 40 MG tablet Take 1 tablet by mouth in the morning and at bedtime 25  Yes Lory Lees APRN - CNP   triamcinolone (ARISTOCORT) 0.5 % cream Apply topically 3 times daily.  Patient taking differently: as needed Apply topically 3 times daily. 16  Yes Candelario Pastrana MD   metoclopramide (REGLAN) 5 MG/5ML solution Take 10 mLs by mouth in the morning and at bedtime 25   Lory Lees APRN - CNP   pantoprazole (PROTONIX) 2 mg/mL SUSP oral suspension 20 mLs by Per J Tube route 2 times daily 25  Lory Lees APRN - CNP   apixaban (ELIQUIS) 5 MG TABS tablet Take 1 tablet by mouth 2 times daily 25   Nicholas Griggs MD   glucose monitoring kit 1 kit by Does not apply route daily  Patient not taking: Reported on 24   Oli Durham APRN - CNP       Current medications:    Current Facility-Administered Medications   Medication Dose Route Frequency

## 2025-04-16 NOTE — DISCHARGE INSTRUCTIONS
Recommendation:   Continue present medications.The patient will need to see his general surgeon to have his gallbladder taken out.   Nothing by mouth for 4 hours following procedure. May resume clear liquids @ 2:35pm. Low fat diet tomorrow. Then advance as tolerated to normal diet.  Notify  For any persistent severe abdominal pain, nausea, vomiting, fever.    For any questions, concerns, or needs please call Dr Dias's office @ [phone]. Or go to the nearest emergency room.    CLEAR LIQUID DIET    Your doctor has prescribed a clear liquid diet for you. This temporary diet is often prescribed right before or after surgery or medical testing. The clear liquid diet is easy to digest and helps the body gradually get used to food again.    FOODS ON CLEAR LIQUID DIET INCLUDE:    Water  Fruit juices without pulp, such as filtered juices or pulp free lemonade  Fruit punch or fruit drinks without pulp or pieces  Hot or cold coffee or tea (do not add milk or creamers of any type)  Sodas such as lemon-lime, cola, ginger ale  Sports drinks  Clear soup- bouillion  Plain or flavored gelatin (do not add fruits or toppings)  Frozen juice bars made from clear juices (no fruit pieces)  Popsicles    ENDOSCOPY DISCHARGE INSTRUCTIONS    You may experience some lightheadedness for the next several hours.  Plan on quiet relaxation for the rest of today.  A responsible adult needs to stay with you today.  Because of the medications you received today-do not drive,operate machinery,or sign any contractual agreement for the next 24 hours.  Do not drink any alcoholic beverages or take any unprescribed medications tonight.  Eat bland food and avoid anything greasy or spicy initially-progress to your normal diet gradually.  Diet restrictions as instructed.  You may resume home medications as instructed.  It is not unusual to experience some mild cramping or gas pains.  If you have any of the following problems, notify your physician or

## 2025-04-16 NOTE — ANESTHESIA POSTPROCEDURE EVALUATION
Department of Anesthesiology  Postprocedure Note    Patient: Edward Barlow  MRN: [id number]  YOB: 1950  Date of evaluation: 4/16/2025    Procedure Summary       Date: 04/16/25 Room / Location: [place] [address] / [facility]    Anesthesia Start: 0944 Anesthesia Stop: 1035    Procedures:       ENDOSCOPIC RETROGRADE CHOLANGIOPANCREATOGRAPHY STENT REMOVAL      ENDOSCOPIC RETROGRADE CHOLANGIOPANCREATOGRAPHY STENT INSERTION      ENDOSCOPIC RETROGRADE CHOLANGIOPANCREATOGRAPHY STONE REMOVAL Diagnosis:       Biliary stricture (HCC)      (Biliary stricture (HCC) [K83.1])    Surgeons: Tom Dias MD Responsible Provider: Candelario Soares MD    Anesthesia Type: General ASA Status: 3            Anesthesia Type: General    Adrianna Phase I: Adrianna Score: 10    Adrianna Phase II:      Anesthesia Post Evaluation    Patient location during evaluation: PACU  Patient participation: complete - patient participated  Level of consciousness: awake and alert  Pain score: 1  Airway patency: patent  Nausea & Vomiting: no nausea and no vomiting  Cardiovascular status: blood pressure returned to baseline  Respiratory status: acceptable  Hydration status: euvolemic  Pain management: adequate    No notable events documented.

## 2025-04-16 NOTE — PROGRESS NOTES
Pt received from Endo to PACU # 8.     Post: Procedure(s):  ENDOSCOPIC RETROGRADE CHOLANGIOPANCREATOGRAPHY STENT REMOVAL  ENDOSCOPIC RETROGRADE CHOLANGIOPANCREATOGRAPHY STENT INSERTION  ENDOSCOPIC RETROGRADE CHOLANGIOPANCREATOGRAPHY STONE REMOVAL    Report received from CRNA and OR RN.    Pt is not fully wakeful from anesthesia, respirations even and unlabored. Pulse ox 99% on room air.     Attached to PACU monitoring system. Alarms and parameters set    No signs of pain or nausea noted at this time.

## 2025-04-16 NOTE — PROGRESS NOTES
Ambulatory Surgery/Procedure Discharge Note    Vitals:    04/16/25 1155   BP: (!) 159/88   Pulse: 67   Resp: 20   Temp: 98 °F (36.7 °C)   SpO2: 98%     Patient discharged to home/self care. Patient discharged via wheel chair by transporter to waiting family/S.O.       4/16/2025 1207

## 2025-04-17 NOTE — H&P
Gastroenterology Note             Pre-operative History and Physical    Patient: Edward Barlow  : 1950  CSN:     History Obtained From:  patient and/or guardian.     HISTORY OF PRESENT ILLNESS:    The patient is a 74 y.o. male  here for ercp   He had an ercp in 10/24 then in nov he developed severe pancreatitis   The stent we had helped   It has been about 6 months and we are going to change the stent and then we need to get out the gb     Past Medical History:    Past Medical History:   Diagnosis Date    Bladder cancer (HCC) 2014    Celiac disease     Hyperlipidemia     Hypertension     Nausea and vomiting 2025    Pancreatitis     Pulmonary embolism 2024    TIA (transient ischemic attack)     No deficits    Uses feeding tube      Past Surgical History:    Past Surgical History:   Procedure Laterality Date    ERCP N/A 10/23/2024    ENDOSCOPIC RETROGRADE CHOLANGIOPANCREATOGRAPHY SPHINCTER/PAPILLOTOMY performed by Tom Dias MD at [facility] ENDOSCOPY    ERCP N/A 10/23/2024    ENDOSCOPIC RETROGRADE CHOLANGIOPANCREATOGRAPHY STONE REMOVAL performed by Tom Dias MD at [facility] ENDOSCOPY    ERCP N/A 10/23/2024    ENDOSCOPIC RETROGRADE CHOLANGIOPANCREATOGRAPHY STENT INSERTION performed by Tom Dias MD at [facility] ENDOSCOPY    MASTECTOMY      double    TUMOR REMOVAL  2014    positive bladder tumor    UPPER GASTROINTESTINAL ENDOSCOPY N/A 10/23/2024    ENDOSCOPIC ULTRASOUND with biopsy performed by Tom Dias MD at [facility] ENDOSCOPY    UPPER GASTROINTESTINAL ENDOSCOPY N/A 2024    ESOPHAGOGASTRODUODENOSCOPY SUBMUCOSAL INJECTION performed by Tom Dias MD at [facility] ENDOSCOPY    UPPER GASTROINTESTINAL ENDOSCOPY N/A 2024    ESOPHAGOGASTRODUODENOSCOPY WITH NASAL-JEJUNAL TUBE PLACEMENT performed by Tom Dias MD at [facility] ENDOSCOPY    UPPER GASTROINTESTINAL ENDOSCOPY N/A 2025    ESOPHAGOGASTRODUODENOSCOPY PERCUTANEOUS ENDOSCOPIC GASTROSTOMY TUBE INSERTION performed by Tom Dias MD at [facility]

## 2025-04-22 ENCOUNTER — TELEPHONE (OUTPATIENT)
Age: 75
End: 2025-04-22

## 2025-04-22 ENCOUNTER — PREP FOR PROCEDURE (OUTPATIENT)
Age: 75
End: 2025-04-22

## 2025-04-22 ENCOUNTER — OFFICE VISIT (OUTPATIENT)
Age: 75
End: 2025-04-22
Payer: MEDICARE

## 2025-04-22 VITALS
BODY MASS INDEX: 22.19 KG/M2 | WEIGHT: 155 LBS | SYSTOLIC BLOOD PRESSURE: 125 MMHG | HEART RATE: 75 BPM | TEMPERATURE: 98.4 F | HEIGHT: 70 IN | OXYGEN SATURATION: 98 % | DIASTOLIC BLOOD PRESSURE: 83 MMHG

## 2025-04-22 DIAGNOSIS — K85.10 GALLSTONE PANCREATITIS: Primary | ICD-10-CM

## 2025-04-22 PROCEDURE — 1036F TOBACCO NON-USER: CPT | Performed by: SURGERY

## 2025-04-22 PROCEDURE — 1159F MED LIST DOCD IN RCRD: CPT | Performed by: SURGERY

## 2025-04-22 PROCEDURE — 1123F ACP DISCUSS/DSCN MKR DOCD: CPT | Performed by: SURGERY

## 2025-04-22 PROCEDURE — 3017F COLORECTAL CA SCREEN DOC REV: CPT | Performed by: SURGERY

## 2025-04-22 PROCEDURE — 3079F DIAST BP 80-89 MM HG: CPT | Performed by: SURGERY

## 2025-04-22 PROCEDURE — G8420 CALC BMI NORM PARAMETERS: HCPCS | Performed by: SURGERY

## 2025-04-22 PROCEDURE — G8427 DOCREV CUR MEDS BY ELIG CLIN: HCPCS | Performed by: SURGERY

## 2025-04-22 PROCEDURE — 99214 OFFICE O/P EST MOD 30 MIN: CPT | Performed by: SURGERY

## 2025-04-22 PROCEDURE — 3074F SYST BP LT 130 MM HG: CPT | Performed by: SURGERY

## 2025-04-22 ASSESSMENT — ENCOUNTER SYMPTOMS: GASTROINTESTINAL NEGATIVE: 1

## 2025-04-22 NOTE — PROGRESS NOTES
Subjective   Patient ID: Edward Barlow is a 74 y.o. male.    HPI  Chief Complaint   Patient presents with    New Patient     Refer for Dr. Diana needs to have gallbladder surgery      Patient noted from hospital stay for previous chronic pancreatitis and functional gastric outlet obstruction.  He had a PEG tube placed at that time for feeds.  Continues to use nocturnally.  Eating okay..  Unclear etiology for pancreatitis at that time.  Recent ERCP for stent exchange recently showed stones and sludge within CBD concerning for gallstone etiology.  Mildly elevated transaminases in January.  Otherwise normal total bilirubin.  Reports energy near baseline.  On Eliquis for PE in December        Past Medical History:   Diagnosis Date    Anticoagulant long-term use December 2024    Pulmonary embolism    Bladder cancer (HCC) 07/28/2014    Celiac disease     GERD (gastroesophageal reflux disease) 9/2024    Pancreatitis, bowl blockage January 2025    Hearing loss     Hyperlipidemia     Hypertension     Nausea and vomiting 01/07/2025    Pancreatitis     Pulmonary embolism 12/2024    TIA (transient ischemic attack)     No deficits    Uses feeding tube        Past Surgical History:   Procedure Laterality Date    ERCP N/A 10/23/2024    ENDOSCOPIC RETROGRADE CHOLANGIOPANCREATOGRAPHY SPHINCTER/PAPILLOTOMY performed by Tom Dias MD at [facility] ENDOSCOPY    ERCP N/A 10/23/2024    ENDOSCOPIC RETROGRADE CHOLANGIOPANCREATOGRAPHY STONE REMOVAL performed by Tom Dias MD at [facility] ENDOSCOPY    ERCP N/A 10/23/2024    ENDOSCOPIC RETROGRADE CHOLANGIOPANCREATOGRAPHY STENT INSERTION performed by Tom Dias MD at [facility] ENDOSCOPY    ERCP N/A 4/16/2025    ENDOSCOPIC RETROGRADE CHOLANGIOPANCREATOGRAPHY STENT REMOVAL performed by Tom Dias MD at [facility] ENDOSCOPY    ERCP N/A 4/16/2025    ENDOSCOPIC RETROGRADE CHOLANGIOPANCREATOGRAPHY STENT INSERTION performed by Tom Dias MD at [facility] ENDOSCOPY    ERCP N/A 4/16/2025    ENDOSCOPIC RETROGRADE

## 2025-04-22 NOTE — TELEPHONE ENCOUNTER
Pt called and rescheduled 's appt to two weeks after surgery. She wanted to make sure Lisa knew this and to call if there were any concerns.

## 2025-04-25 ENCOUNTER — ANESTHESIA EVENT (OUTPATIENT)
Age: 75
End: 2025-04-25
Payer: MEDICARE

## 2025-04-25 RX ORDER — IBUPROFEN 200 MG
200 TABLET ORAL EVERY 6 HOURS PRN
COMMUNITY

## 2025-04-25 NOTE — TELEPHONE ENCOUNTER
Pt wife called saying the call from pre cert will not go through on her phone for pt surgery. She tries to call back and cannot get a hold of them. Call was disconnected.

## 2025-04-28 ENCOUNTER — HOSPITAL ENCOUNTER (OUTPATIENT)
Age: 75
Setting detail: OUTPATIENT SURGERY
Discharge: HOME | End: 2025-04-28
Attending: SURGERY | Admitting: SURGERY
Payer: MEDICARE

## 2025-04-28 ENCOUNTER — ANESTHESIA (OUTPATIENT)
Age: 75
End: 2025-04-28
Payer: MEDICARE

## 2025-04-28 ENCOUNTER — APPOINTMENT (OUTPATIENT)
Age: 75
End: 2025-04-28
Attending: SURGERY
Payer: MEDICARE

## 2025-04-28 VITALS
HEART RATE: 85 BPM | OXYGEN SATURATION: 95 % | HEIGHT: 71 IN | TEMPERATURE: 98 F | BODY MASS INDEX: 21.7 KG/M2 | SYSTOLIC BLOOD PRESSURE: 148 MMHG | DIASTOLIC BLOOD PRESSURE: 82 MMHG | RESPIRATION RATE: 20 BRPM | WEIGHT: 155 LBS

## 2025-04-28 DIAGNOSIS — K85.10 GALLSTONE PANCREATITIS: Primary | ICD-10-CM

## 2025-04-28 PROCEDURE — 3600000014 HC SURGERY LEVEL 4 ADDTL 15MIN: Performed by: SURGERY

## 2025-04-28 PROCEDURE — 6360000002 HC RX W HCPCS: Performed by: ANESTHESIOLOGY

## 2025-04-28 PROCEDURE — 2580000003 HC RX 258: Performed by: ANESTHESIOLOGY

## 2025-04-28 PROCEDURE — 7100000000 HC PACU RECOVERY - FIRST 15 MIN: Performed by: SURGERY

## 2025-04-28 PROCEDURE — 2500000003 HC RX 250 WO HCPCS: Performed by: NURSE ANESTHETIST, CERTIFIED REGISTERED

## 2025-04-28 PROCEDURE — 6360000002 HC RX W HCPCS: Performed by: SURGERY

## 2025-04-28 PROCEDURE — 3700000000 HC ANESTHESIA ATTENDED CARE: Performed by: SURGERY

## 2025-04-28 PROCEDURE — 6360000002 HC RX W HCPCS: Performed by: NURSE ANESTHETIST, CERTIFIED REGISTERED

## 2025-04-28 PROCEDURE — 7100000011 HC PHASE II RECOVERY - ADDTL 15 MIN: Performed by: SURGERY

## 2025-04-28 PROCEDURE — 6360000004 HC RX CONTRAST MEDICATION: Performed by: SURGERY

## 2025-04-28 PROCEDURE — 2500000003 HC RX 250 WO HCPCS: Performed by: SURGERY

## 2025-04-28 PROCEDURE — 3700000001 HC ADD 15 MINUTES (ANESTHESIA): Performed by: SURGERY

## 2025-04-28 PROCEDURE — 7100000001 HC PACU RECOVERY - ADDTL 15 MIN: Performed by: SURGERY

## 2025-04-28 PROCEDURE — 2720000010 HC SURG SUPPLY STERILE: Performed by: SURGERY

## 2025-04-28 PROCEDURE — 88304 TISSUE EXAM BY PATHOLOGIST: CPT

## 2025-04-28 PROCEDURE — 74300 X-RAY BILE DUCTS/PANCREAS: CPT

## 2025-04-28 PROCEDURE — 3600000004 HC SURGERY LEVEL 4 BASE: Performed by: SURGERY

## 2025-04-28 PROCEDURE — 7100000010 HC PHASE II RECOVERY - FIRST 15 MIN: Performed by: SURGERY

## 2025-04-28 PROCEDURE — 2709999900 HC NON-CHARGEABLE SUPPLY: Performed by: SURGERY

## 2025-04-28 PROCEDURE — 47563 LAPARO CHOLECYSTECTOMY/GRAPH: CPT | Performed by: SURGERY

## 2025-04-28 RX ORDER — SODIUM CHLORIDE 0.9 % (FLUSH) 0.9 %
5-40 SYRINGE (ML) INJECTION EVERY 12 HOURS SCHEDULED
Status: DISCONTINUED | OUTPATIENT
Start: 2025-04-28 | End: 2025-04-28 | Stop reason: HOSPADM

## 2025-04-28 RX ORDER — MAGNESIUM HYDROXIDE 1200 MG/15ML
LIQUID ORAL CONTINUOUS PRN
Status: COMPLETED | OUTPATIENT
Start: 2025-04-28 | End: 2025-04-28

## 2025-04-28 RX ORDER — LIDOCAINE HYDROCHLORIDE 20 MG/ML
INJECTION, SOLUTION EPIDURAL; INFILTRATION; INTRACAUDAL; PERINEURAL
Status: DISCONTINUED | OUTPATIENT
Start: 2025-04-28 | End: 2025-04-28 | Stop reason: SDUPTHER

## 2025-04-28 RX ORDER — CEFAZOLIN SODIUM 2 G/50ML
2000 SOLUTION INTRAVENOUS
Status: COMPLETED | OUTPATIENT
Start: 2025-04-28 | End: 2025-04-28

## 2025-04-28 RX ORDER — SUCCINYLCHOLINE CHLORIDE 20 MG/ML
INJECTION INTRAMUSCULAR; INTRAVENOUS
Status: DISCONTINUED | OUTPATIENT
Start: 2025-04-28 | End: 2025-04-28 | Stop reason: SDUPTHER

## 2025-04-28 RX ORDER — GLYCOPYRROLATE 0.2 MG/ML
INJECTION INTRAMUSCULAR; INTRAVENOUS
Status: DISCONTINUED | OUTPATIENT
Start: 2025-04-28 | End: 2025-04-28 | Stop reason: SDUPTHER

## 2025-04-28 RX ORDER — SODIUM CHLORIDE 9 MG/ML
INJECTION, SOLUTION INTRAVENOUS PRN
Status: DISCONTINUED | OUTPATIENT
Start: 2025-04-28 | End: 2025-04-28 | Stop reason: HOSPADM

## 2025-04-28 RX ORDER — OXYCODONE HYDROCHLORIDE 5 MG/1
5 TABLET ORAL PRN
Status: DISCONTINUED | OUTPATIENT
Start: 2025-04-28 | End: 2025-04-28 | Stop reason: HOSPADM

## 2025-04-28 RX ORDER — ONDANSETRON 2 MG/ML
INJECTION INTRAMUSCULAR; INTRAVENOUS
Status: DISCONTINUED | OUTPATIENT
Start: 2025-04-28 | End: 2025-04-28 | Stop reason: SDUPTHER

## 2025-04-28 RX ORDER — ROCURONIUM BROMIDE 10 MG/ML
INJECTION, SOLUTION INTRAVENOUS
Status: DISCONTINUED | OUTPATIENT
Start: 2025-04-28 | End: 2025-04-28 | Stop reason: SDUPTHER

## 2025-04-28 RX ORDER — PROPOFOL 10 MG/ML
INJECTION, EMULSION INTRAVENOUS
Status: DISCONTINUED | OUTPATIENT
Start: 2025-04-28 | End: 2025-04-28 | Stop reason: SDUPTHER

## 2025-04-28 RX ORDER — ONDANSETRON 2 MG/ML
4 INJECTION INTRAMUSCULAR; INTRAVENOUS
Status: COMPLETED | OUTPATIENT
Start: 2025-04-28 | End: 2025-04-28

## 2025-04-28 RX ORDER — BUPIVACAINE HYDROCHLORIDE 5 MG/ML
INJECTION, SOLUTION EPIDURAL; INTRACAUDAL; PERINEURAL PRN
Status: DISCONTINUED | OUTPATIENT
Start: 2025-04-28 | End: 2025-04-28 | Stop reason: ALTCHOICE

## 2025-04-28 RX ORDER — FENTANYL CITRATE 50 UG/ML
INJECTION, SOLUTION INTRAMUSCULAR; INTRAVENOUS
Status: DISCONTINUED | OUTPATIENT
Start: 2025-04-28 | End: 2025-04-28 | Stop reason: SDUPTHER

## 2025-04-28 RX ORDER — SODIUM CHLORIDE 0.9 % (FLUSH) 0.9 %
5-40 SYRINGE (ML) INJECTION PRN
Status: DISCONTINUED | OUTPATIENT
Start: 2025-04-28 | End: 2025-04-28 | Stop reason: HOSPADM

## 2025-04-28 RX ORDER — OXYCODONE HYDROCHLORIDE 5 MG/1
10 TABLET ORAL PRN
Status: DISCONTINUED | OUTPATIENT
Start: 2025-04-28 | End: 2025-04-28 | Stop reason: HOSPADM

## 2025-04-28 RX ORDER — NALOXONE HYDROCHLORIDE 0.4 MG/ML
INJECTION, SOLUTION INTRAMUSCULAR; INTRAVENOUS; SUBCUTANEOUS PRN
Status: DISCONTINUED | OUTPATIENT
Start: 2025-04-28 | End: 2025-04-28 | Stop reason: HOSPADM

## 2025-04-28 RX ORDER — DEXAMETHASONE SODIUM PHOSPHATE 4 MG/ML
INJECTION, SOLUTION INTRA-ARTICULAR; INTRALESIONAL; INTRAMUSCULAR; INTRAVENOUS; SOFT TISSUE
Status: DISCONTINUED | OUTPATIENT
Start: 2025-04-28 | End: 2025-04-28 | Stop reason: SDUPTHER

## 2025-04-28 RX ADMIN — ROCURONIUM BROMIDE 35 MG: 10 INJECTION, SOLUTION INTRAVENOUS at 08:38

## 2025-04-28 RX ADMIN — SUGAMMADEX 200 MG: 100 INJECTION, SOLUTION INTRAVENOUS at 09:09

## 2025-04-28 RX ADMIN — DEXAMETHASONE SODIUM PHOSPHATE 8 MG: 4 INJECTION, SOLUTION INTRA-ARTICULAR; INTRALESIONAL; INTRAMUSCULAR; INTRAVENOUS; SOFT TISSUE at 08:38

## 2025-04-28 RX ADMIN — FENTANYL CITRATE 50 MCG: 50 INJECTION INTRAMUSCULAR; INTRAVENOUS at 08:32

## 2025-04-28 RX ADMIN — FENTANYL CITRATE 25 MCG: 50 INJECTION INTRAMUSCULAR; INTRAVENOUS at 08:25

## 2025-04-28 RX ADMIN — ONDANSETRON 4 MG: 2 INJECTION, SOLUTION INTRAMUSCULAR; INTRAVENOUS at 10:12

## 2025-04-28 RX ADMIN — HYDROMORPHONE HYDROCHLORIDE 0.25 MG: 1 INJECTION, SOLUTION INTRAMUSCULAR; INTRAVENOUS; SUBCUTANEOUS at 10:27

## 2025-04-28 RX ADMIN — PROPOFOL 100 MG: 10 INJECTION, EMULSION INTRAVENOUS at 08:32

## 2025-04-28 RX ADMIN — LIDOCAINE HYDROCHLORIDE 60 MG: 20 INJECTION, SOLUTION EPIDURAL; INFILTRATION; INTRACAUDAL; PERINEURAL at 08:32

## 2025-04-28 RX ADMIN — ROCURONIUM BROMIDE 5 MG: 10 INJECTION, SOLUTION INTRAVENOUS at 08:32

## 2025-04-28 RX ADMIN — SUCCINYLCHOLINE CHLORIDE 80 MG: 20 INJECTION, SOLUTION INTRAMUSCULAR; INTRAVENOUS at 08:32

## 2025-04-28 RX ADMIN — SODIUM CHLORIDE: 9 INJECTION, SOLUTION INTRAVENOUS at 08:24

## 2025-04-28 RX ADMIN — HYDROMORPHONE HYDROCHLORIDE 0.25 MG: 1 INJECTION, SOLUTION INTRAMUSCULAR; INTRAVENOUS; SUBCUTANEOUS at 10:40

## 2025-04-28 RX ADMIN — PHENYLEPHRINE HYDROCHLORIDE 50 MCG: 10 INJECTION INTRAVENOUS at 08:38

## 2025-04-28 RX ADMIN — FENTANYL CITRATE 25 MCG: 50 INJECTION INTRAMUSCULAR; INTRAVENOUS at 08:46

## 2025-04-28 RX ADMIN — CEFAZOLIN SODIUM 2000 MG: 2 SOLUTION INTRAVENOUS at 08:35

## 2025-04-28 RX ADMIN — GLYCOPYRROLATE 0.1 MG: 0.2 INJECTION, SOLUTION INTRAMUSCULAR; INTRAVENOUS at 08:25

## 2025-04-28 RX ADMIN — ONDANSETRON 4 MG: 2 INJECTION INTRAMUSCULAR; INTRAVENOUS at 08:38

## 2025-04-28 ASSESSMENT — PAIN SCALES - GENERAL
PAINLEVEL_OUTOF10: 4
PAINLEVEL_OUTOF10: 0
PAINLEVEL_OUTOF10: 5
PAINLEVEL_OUTOF10: 3
PAINLEVEL_OUTOF10: 5

## 2025-04-28 ASSESSMENT — PAIN DESCRIPTION - LOCATION
LOCATION: ABDOMEN

## 2025-04-28 ASSESSMENT — PAIN DESCRIPTION - PAIN TYPE
TYPE: SURGICAL PAIN

## 2025-04-28 ASSESSMENT — PAIN - FUNCTIONAL ASSESSMENT: PAIN_FUNCTIONAL_ASSESSMENT: 0-10

## 2025-04-28 ASSESSMENT — PAIN DESCRIPTION - FREQUENCY
FREQUENCY: CONTINUOUS

## 2025-04-28 ASSESSMENT — PAIN DESCRIPTION - DESCRIPTORS
DESCRIPTORS: DISCOMFORT
DESCRIPTORS: SORE

## 2025-04-28 NOTE — DISCHARGE INSTRUCTIONS
be disposed of by taking them to a drop-off box or take-back program that is authorized by the [employer].  Access to a site near you can be found on the [employer] website (deadiversion.[employer].gov).    If you have a CPAP machine, it is very important that you use it daily during all periods of sleep and daytime rest during your recovery at home.  Surgery and Anesthesia place a significant amount of stress on your body.  Using your CPAP will help keep you safe and lessen the negative effects of that stress.    FOLLOW-UP RECOVERY CARE:  [x]Call the office at [phone] for follow-up appointment and problems    Watch for these possible complications, symptoms, or side effects of anesthesia.  Call physician if they or any other problems occur:  Signs of INFECTION   > Fever over 101°     > Redness, swelling, hardness or warmth at the operative site   >Foul smelling or cloudy drainage at the operative site   Unrelieved PAIN  Unrelieved NAUSEA  Blood soaked dressing.  (Some oozing may be normal)  Inability to urinate      Numb, pale, blue, cold or tingling extremity      Physician:  Dr. Garcia    The above instructions were reviewed with patient/significant other.  The following additional patient specific information was reviewed with the patient/significant other:  [x]Procedure/physician specific instructions    [x]FAQ Surgical Site Infections        If you smoke STOP. We care about your health!

## 2025-04-28 NOTE — ANESTHESIA PRE PROCEDURE
Department of Anesthesiology  Preprocedure Note       Name:  Edward Barlow   Age:  74 y.o.  :  1950                                          MRN:  [id number]         Date:  2025      Surgeon: Surgeon(s):  Dimitry Garcia MD    Procedure: Procedure(s):  Laparoscopic cholecystectomy with cholangiogram, possible open    Medications prior to admission:   Prior to Admission medications    Medication Sig Start Date End Date Taking? Authorizing Provider   ibuprofen (ADVIL;MOTRIN) 200 MG tablet Take 1 tablet by mouth every 6 hours as needed for Pain   Yes Provider, Historical, MD   CREON [id number] units CPEP delayed release capsule Take by mouth 3 times daily (with meals) 25  Yes ProviderBenny MD   polyethylene glycol (GLYCOLAX) 17 GM/SCOOP powder Take 17 g by mouth daily 25  Yes Benny Sheriff MD   metoclopramide (REGLAN) 10 MG tablet Take 1 tablet by mouth in the morning and at bedtime 25  Yes Lory Lees APRN - CNP   ondansetron (ZOFRAN-ODT) 4 MG disintegrating tablet Take 1 tablet by mouth every 8 hours as needed for Nausea or Vomiting 25  Yes Lory Lees APRN - CNP   pantoprazole (PROTONIX) 40 MG tablet Take 1 tablet by mouth in the morning and at bedtime 25  Yes Lory Lees APRN - CNP   metoclopramide (REGLAN) 5 MG/5ML solution Take 10 mLs by mouth in the morning and at bedtime  Patient not taking: Reported on 2025   Lory Lees APRN - CNP   apixaban (ELIQUIS) 5 MG TABS tablet Take 1 tablet by mouth 2 times daily 25   Nicholas Griggs MD   glucose monitoring kit 1 kit by Does not apply route daily  Patient not taking: Reported on 24   Oli Durham APRN - CNP   triamcinolone (ARISTOCORT) 0.5 % cream Apply topically 3 times daily.  Patient not taking: Reported on 2025   Candelario Pastrana MD       Current medications:    Current Facility-Administered Medications   Medication Dose

## 2025-04-28 NOTE — PROGRESS NOTES
Ambulatory Surgery/Endo Procedure Discharge Note    Vitals:    04/28/25 1058   BP: (!) 148/82   Pulse: 85   Resp: 20   Temp: 98 °F (36.7 °C)   SpO2: 95%       In: 850 [I.V.:800]  Out: -     Pain assessment:  present - adequately treated. Pt satisfied  Pain Level: 4    Pt states \"ready to go home\". Pt alert and oriented x4. IV removed. Denies N/V. Discharge instructions given to pt and wife, verbalized understanding of all instructions. Left with all belongings and discharge instructions.     Patient discharged to home with personal belongings. Patient discharged via wheel chair by RN to waiting family/S.O.       4/28/2025 11:20 AM  
Pt received from OR to PACU # 9.     Post: Procedure(s):  Laparoscopic cholecystectomy with cholangiogram     Report received from CRNA and OR RN.     Pt is not fully wakeful from anesthesia. Respirations even and unlabored with pulse ox of 98% on 2LNC and oral airway in place. Laparoscopic sites on abdomen x5 clean, dry and intact with surgical glue.      Attached to PACU monitoring system. Alarms and parameters set    No signs of pain or nausea noted at this time.    
Pt resting quietly in bed. Wife at bedside.   
Teaching / education initiated regarding perioperative experience, expectations, and pain management during stay. Patient verbalized understanding.    
with a insulin pump, keep set on basal rate on day of surgery. Long-acting insulin should be administered the evening before, take only half of usual dose. Always check with prescribing doctor if there are any questions.   [] Do not give any correction doses of insulin the morning of procedure.  [] GLP-1 inhibitor medications should be stopped 7 days prior to surgery.  [] Do not take any oral diabetic medications the morning of procedure.    [x]  You should shower the night before or the morning of surgery with an antibacterial soap i.e. safeguard or dial. Your surgeon may require you to use Hibiclens (an antiseptic skin cleanser) please follow physician’s instructions.     [x]  Do not shave or wax operative site 96 hours (4 days) prior to procedure.      [x]  No jewelry including body piercings, no makeup, or nail polish. No lotion, powders, creams, perfumes or metal hairclips.     [x]  Dress in loose comfortable clothing. Leave all valuables at home.    [x]  Please contact your surgeon if you are taking blood thinners, aspirin, anti-inflammatory medications, vitamins, or fish oil. They may require you to stop taking them 5-7 days prior.             ITEMS TO BRING TO THE HOSPITAL ON DOS:     []  Your prescribed rescue inhaler     [x]  ID and insurance card, form of payment if have co-pay, current medication list, living will and POA (if you have one).     []  Home c-pap and/or home O2 oxygen tank.      []  Any assistive medical devices (i.e.Walker, cane, wheelchair, etc.) or immobilizer or sling needed postoperatively      [x]  You may bring dentures, glasses, contacts, and/or hearing aids, however, they will need to be removed before your procedure. Please bring cases to store them in for safekeeping and leave them with the person you came with.        Our goal is to provide you with safe and excellent care. Please contact pre-admission testing if you have any further questions. (Please note, these are generalized

## 2025-04-28 NOTE — H&P
Update History & Physical    The patient's History and Physical of April 22, 2025 was reviewed with the patient and I examined the patient. There was no change.  Plan Laparoscopic cholecystectomy with cholangiogram, possible open. The surgical site was confirmed by the patient and me.       Plan: The risks, benefits, expected outcome, and alternative to the recommended procedure have been discussed with the patient. Patient understands and wants to proceed with the procedure.     Electronically signed by Dimitry Garcia MD on 4/28/2025 at 7:08 AM

## 2025-04-28 NOTE — ANESTHESIA POSTPROCEDURE EVALUATION
10.3 (L)            01/17/2025 04:27 AM        HCT                      31.7 (L)            01/17/2025 04:27 AM        PLT                      155                 01/17/2025 04:27 AM   RENAL  Lab Results       Component                Value               Date/Time                  NA                       141                 01/17/2025 04:27 AM        K                        4.5                 01/17/2025 04:27 AM        K                        3.9                 09/25/2024 09:06 AM        CL                       107                 01/17/2025 04:27 AM        CO2                      24                  01/17/2025 04:27 AM        BUN                      16                  01/17/2025 04:27 AM        CREATININE               0.7 (L)             03/10/2025 08:53 AM        CREATININE               0.6 (L)             01/17/2025 04:27 AM        GLUCOSE                  115 (H)             01/17/2025 04:27 AM   COAGS  Lab Results       Component                Value               Date/Time                  PROTIME                  18.6 (H)            01/04/2025 09:21 PM        INR                      1.5 (H)             01/04/2025 09:21 PM        APTT                     30.1                01/17/2025 04:27 AM     Intake & Output:  @24HRIO@    Nausea & Vomiting:  No    Level of Consciousness:  Awake    Pain Assessment:  Adequate analgesia    Anesthesia Complications:  No apparent anesthetic complications    SUMMARY      Vital signs stable  OK to discharge from Stage I post anesthesia care.  Care transferred from Anesthesiology department on discharge from perioperative area   Pain management: satisfactory to patient    No notable events documented.

## 2025-04-29 LAB — SURGICAL PATHOLOGY REPORT: NORMAL

## 2025-04-30 ENCOUNTER — TELEPHONE (OUTPATIENT)
Age: 75
End: 2025-04-30

## 2025-04-30 NOTE — TELEPHONE ENCOUNTER
Patient's wife called and stated his pain level is currently at a level 2. Would like to transition from rx pain medication to just tylenol. Please review and advise patient on how much and how often to take this.

## 2025-04-30 NOTE — TELEPHONE ENCOUNTER
Spoke with Dr. Garcia and he stated that ok to take 2 tabs Q 8 hours PRN.  I spoke with wife Ольга and she is aware of this.  No further action needed.  She verbalized understanding.

## 2025-04-30 NOTE — TELEPHONE ENCOUNTER
Called and spoke to patients wife Aracelis , she gave me documentation of the patients temp from all today     2:30 pm- 99.8  3:15 pm 101  4 pm 99.4  4:43 pm- 98.5  4:48 pm- 99.5    Stated that patient took his last percocet at 7 am and then started 2 500 mg of tylenol at 1 pm.  She stated she wasn't sure why the temp went up after patient took tylenol.    I asked if he was in intense pain at the time and wife states patient has been in a 2-3 pain all day.       Wife states around the peg tube there is some blood drainage around the site. NOT in tube.    Wife stated there is some dark/red color in his peg tube. She believes it is from his ensure but states she is not sure.      Wife also stated they called and left a vm with patients GI doctor as well

## 2025-04-30 NOTE — TELEPHONE ENCOUNTER
Pt wife called stating pt has a tamp of 101, also mention there was bloody discharge around his peg tube, and more discharge then normal. She wasn't sure if she should call us or his GI. advised that staff was currently in clinic and would address when they are available.

## 2025-04-30 NOTE — TELEPHONE ENCOUNTER
Notified patients wife per Dr. Garcia-  \"Continue to monitor since its coming down. Encourage cough and deep breathing\"    Wife verbalized understanding and made wife aware to call back with any changes       No further action required.

## 2025-05-01 ENCOUNTER — HOSPITAL ENCOUNTER (OUTPATIENT)
Dept: GENERAL RADIOLOGY | Age: 75
Discharge: HOME | End: 2025-05-01
Attending: INTERNAL MEDICINE
Payer: MEDICARE

## 2025-05-01 DIAGNOSIS — R50.9 FEVER AND CHILLS: ICD-10-CM

## 2025-05-01 PROCEDURE — 71046 X-RAY EXAM CHEST 2 VIEWS: CPT

## 2025-05-13 ENCOUNTER — OFFICE VISIT (OUTPATIENT)
Age: 75
End: 2025-05-13

## 2025-05-13 VITALS
TEMPERATURE: 98.6 F | DIASTOLIC BLOOD PRESSURE: 78 MMHG | HEIGHT: 71 IN | WEIGHT: 150 LBS | BODY MASS INDEX: 21 KG/M2 | OXYGEN SATURATION: 96 % | HEART RATE: 87 BPM | SYSTOLIC BLOOD PRESSURE: 116 MMHG

## 2025-05-13 DIAGNOSIS — K85.10 GALLSTONE PANCREATITIS: Primary | ICD-10-CM

## 2025-05-13 PROCEDURE — 99024 POSTOP FOLLOW-UP VISIT: CPT | Performed by: SURGERY

## 2025-05-13 NOTE — PROGRESS NOTES
Subjective   Patient ID: Edward Barlow is a 74 y.o. male.    HPI  Status post lap rochelle with cholangiogram 2 weeks ago.  Doing fairly well.  Pain mild.  Main complaint for the first 2 days with low-grade fevers.  Resolved with Tylenol and time.  Tolerating regular diet without loose stools.  No significant change in appetite.  Continues with nocturnal tube feeds.  No apparent complications    Pathology  Gallbladder, cholecystectomy:- Cholelithiasis.- Chronic   cholecystitis.- Negative for malignancy.   Review of Systems       Objective   Physical Exam   Wounds healed  Abdomen nontender    Assessment    Diagnosis Orders   1. Gallstone pancreatitis               Plan   Recovering fairly well  Pathology and IntraOp findings discussed  No diet restrictions at this point  Defer to GI about removal of PEG tube.  May want to consider trial off feeds first to see if he can maintain calories  Return as needed        Dimitry Garcia MD

## 2025-06-20 ENCOUNTER — TRANSCRIBE ORDERS (OUTPATIENT)
Dept: ADMINISTRATIVE | Age: 75
End: 2025-06-20

## 2025-06-20 DIAGNOSIS — R63.0 LOSS OF APPETITE: Primary | ICD-10-CM

## 2025-06-25 ENCOUNTER — HOSPITAL ENCOUNTER (OUTPATIENT)
Dept: CT IMAGING | Age: 75
Discharge: HOME | End: 2025-06-25
Attending: INTERNAL MEDICINE
Payer: MEDICARE

## 2025-06-25 DIAGNOSIS — R63.0 LOSS OF APPETITE: ICD-10-CM

## 2025-06-25 PROCEDURE — 6360000004 HC RX CONTRAST MEDICATION: Performed by: INTERNAL MEDICINE

## 2025-06-25 PROCEDURE — 74177 CT ABD & PELVIS W/CONTRAST: CPT

## 2025-06-25 RX ORDER — IOPAMIDOL 755 MG/ML
75 INJECTION, SOLUTION INTRAVASCULAR
Status: COMPLETED | OUTPATIENT
Start: 2025-06-25 | End: 2025-06-25

## 2025-06-25 RX ADMIN — IOPAMIDOL 75 ML: 755 INJECTION, SOLUTION INTRAVENOUS at 12:35

## 2025-06-27 NOTE — OP NOTE
Laparoscopic Cholecystectomy Operative Report      Patient: Edward Barlow MRN: [id number]     YOB: 1950  Age: 74 y.o.  Sex: male        Primary Care Physician: Ellis Ordonez MD         DATE OF OPERATION: 4/28/2025     PREOPERATIVE DIAGNOSIS: gallstone pancreatitis    POSTOPERATIVE DIAGNOSIS: gallstone pancreatitis    PROCEDURE PERFORMED: Laparoscopic cholecystectomy with cholangiogram    SURGEON: Dimitry Garcia MD, MD    ANESTHESIA: GETA with local anesthetic    ASA CLASS: 3    DVT PROPHYLAXIS: BLE pneumatic compression devices    ANTIBIOTICS: Ancef 2 grams IV    INDICATIONS: Symptomatic gallbladder disease. The patient has an endoscopic ultrasound that does show gallstones.  He had an ERCP last week as well showing debris in CBD.  The patient's symptoms were felt to be secondary to gallbladder disease and they presented electively for removal after the risks, benefits and alternatives were discussed with them.  We discussed risks of bleeding, infection, anesthesia, injury to surrounding structures requiring open procedure and post-operative bile leak.    Procedure Details:       After informed consent was obtained, the patient was brought to the operating room and placed on the table in the supine position. Preoperative antibiotics were given. Once under general endotracheal anesthesia, the abdomen was prepped and sterilely draped in the standard fashion. A time-out was performed for patient and procedure verification. A small periumbilical incision was made just at the umbilicus and a Veress needle inserted into peritoneal cavity. The abdomen was insufflated with carbon dioxide to a pressure of 15 mmHg. A 5mm trocar was introduced and a camera placed.  No injury from veress or trocar placement was noted.  Under direct vision, a 10mm port was placed in the subxiphoid location and two 5 mm ports placed in the right upper quadrant. The dome of the gallbladder was grasped and elevated up and over the  Recent Labs     06/25/25  0350 06/26/25  0403 06/27/25  0542   SODIUM 132* 136 134*   Likely in setting of poor oral intake/hypovolemic hyponatremia.  Na still low on 6/25 at 132.  Resolved.    Plan:  AM CMPs

## 2025-07-16 ENCOUNTER — TRANSCRIBE ORDERS (OUTPATIENT)
Dept: ADMINISTRATIVE | Age: 75
End: 2025-07-16

## 2025-07-16 DIAGNOSIS — R63.8 UNABLE TO EAT: Primary | ICD-10-CM

## 2025-07-23 ENCOUNTER — HOSPITAL ENCOUNTER (OUTPATIENT)
Age: 75
Discharge: HOME | End: 2025-07-23
Attending: INTERNAL MEDICINE
Payer: MEDICARE

## 2025-07-23 DIAGNOSIS — R63.8 UNABLE TO EAT: ICD-10-CM

## 2025-07-23 PROCEDURE — A9541 TC99M SULFUR COLLOID: HCPCS | Performed by: INTERNAL MEDICINE

## 2025-07-23 PROCEDURE — 78264 GASTRIC EMPTYING IMG STUDY: CPT

## 2025-07-23 PROCEDURE — 3430000000 HC RX DIAGNOSTIC RADIOPHARMACEUTICAL: Performed by: INTERNAL MEDICINE

## 2025-07-23 RX ADMIN — Medication 1.1 MILLICURIE: at 07:15

## 2025-07-28 ENCOUNTER — ANESTHESIA EVENT (OUTPATIENT)
Age: 75
End: 2025-07-28
Payer: MEDICARE

## 2025-07-29 NOTE — PROGRESS NOTES
[facility]      Unable to reach patient in person. [facility] @ [phone] with Procedure date/time and arrival time, Hospital address, and PAT Phone number.  All Pre-operative instructions below sent to [facility].     Date of Surgery/Procedure: 7/30/25   Time: 1030  Arrival: 0900    1. You need a responsible adult 18 or older to stay on site while you are here, drive you home and stay with you for 24 hours. They must have permission to receive your post-op instructions  2. Nothing to eat or drink after midnight-including, gum, candy, mints or ice. No alcohol, smoking or marijuana in any form within 24 hours of procedure  3. If you normally take heart, blood pressure, seizure, breathing or thyroid medications in the morning please do so with a small sip of water. Use inhalers, bring rescue inhaler  4. All GLP1 diabetic injections (Ozempic, Mounjaro, Wegovy etc.), and other weight loss medications such as Adipex, Qsymia, Regimex, Contrave, Rybelsus, Victoza & Giacomo/Zencal or Tenuate hold 7 days prior to the procedure. Hold SGLT-2 inhibitor medications (Invokana, Farxiga, Jardiance etc.) 3-4 days prior to procedure.  Long-acting insulin should be administered the evening before, take only half of usual dose. Patients with a insulin pump, keep set on basal rate on day of surgery. Do not give any correction doses of insulin the morning of procedure.Do not take any oral diabetic medications the morning of procedure.  Always check with prescribing doctor if there are any questions.   5.  Follow your doctors instructions regarding any prescribed blood thinners. Do not take any NSAIDs such as ibuprofen or naprosyn for 5-7 days prior.  6.  Bring a complete list of all your medications (prescriptions and over the counter), picture ID and insurance card. Leave all other valuables at home  7.  Follow any instructions your surgeon's office has given you. Call your surgeon's

## 2025-07-30 ENCOUNTER — ANESTHESIA (OUTPATIENT)
Age: 75
End: 2025-07-30
Payer: MEDICARE

## 2025-07-30 ENCOUNTER — HOSPITAL ENCOUNTER (OUTPATIENT)
Age: 75
Setting detail: OUTPATIENT SURGERY
Discharge: HOME | End: 2025-07-30
Attending: INTERNAL MEDICINE | Admitting: INTERNAL MEDICINE
Payer: MEDICARE

## 2025-07-30 PROCEDURE — 6360000002 HC RX W HCPCS: Performed by: NURSE ANESTHETIST, CERTIFIED REGISTERED

## 2025-07-30 PROCEDURE — 2580000003 HC RX 258: Performed by: ANESTHESIOLOGY

## 2025-07-30 RX ORDER — FENTANYL CITRATE 50 UG/ML
INJECTION, SOLUTION INTRAMUSCULAR; INTRAVENOUS
Status: DISCONTINUED | OUTPATIENT
Start: 2025-07-30 | End: 2025-07-30 | Stop reason: SDUPTHER

## 2025-07-30 RX ORDER — GLYCOPYRROLATE 0.2 MG/ML
INJECTION INTRAMUSCULAR; INTRAVENOUS
Status: DISCONTINUED | OUTPATIENT
Start: 2025-07-30 | End: 2025-07-30 | Stop reason: SDUPTHER

## 2025-07-30 RX ORDER — PROPOFOL 10 MG/ML
INJECTION, EMULSION INTRAVENOUS
Status: DISCONTINUED | OUTPATIENT
Start: 2025-07-30 | End: 2025-07-30 | Stop reason: SDUPTHER

## 2025-07-30 RX ORDER — LIDOCAINE HYDROCHLORIDE 20 MG/ML
INJECTION, SOLUTION EPIDURAL; INFILTRATION; INTRACAUDAL; PERINEURAL
Status: DISCONTINUED | OUTPATIENT
Start: 2025-07-30 | End: 2025-07-30 | Stop reason: SDUPTHER

## 2025-07-30 RX ORDER — ONDANSETRON 2 MG/ML
INJECTION INTRAMUSCULAR; INTRAVENOUS
Status: DISCONTINUED | OUTPATIENT
Start: 2025-07-30 | End: 2025-07-30 | Stop reason: SDUPTHER

## 2025-07-30 RX ADMIN — FENTANYL CITRATE 25 MCG: 50 INJECTION, SOLUTION INTRAMUSCULAR; INTRAVENOUS at 11:05

## 2025-07-30 RX ADMIN — ONDANSETRON 4 MG: 2 INJECTION, SOLUTION INTRAMUSCULAR; INTRAVENOUS at 11:10

## 2025-07-30 RX ADMIN — PROPOFOL 30 MG: 10 INJECTION, EMULSION INTRAVENOUS at 10:53

## 2025-07-30 RX ADMIN — LIDOCAINE HYDROCHLORIDE 100 MG: 20 INJECTION, SOLUTION EPIDURAL; INFILTRATION; INTRACAUDAL; PERINEURAL at 10:53

## 2025-07-30 RX ADMIN — SODIUM CHLORIDE: 9 INJECTION, SOLUTION INTRAVENOUS at 10:37

## 2025-07-30 RX ADMIN — PROPOFOL 140 MCG/KG/MIN: 10 INJECTION, EMULSION INTRAVENOUS at 10:54

## 2025-07-30 RX ADMIN — GLYCOPYRROLATE 0.1 MG: 0.2 INJECTION, SOLUTION INTRAMUSCULAR; INTRAVENOUS at 10:47

## 2025-07-30 ASSESSMENT — PAIN - FUNCTIONAL ASSESSMENT: PAIN_FUNCTIONAL_ASSESSMENT: 0-10

## 2025-07-30 ASSESSMENT — ENCOUNTER SYMPTOMS: SHORTNESS OF BREATH: 0

## 2025-07-30 ASSESSMENT — PAIN SCALES - GENERAL
PAINLEVEL_OUTOF10: 0
PAINLEVEL_OUTOF10: 0

## 2025-07-30 NOTE — ANESTHESIA PRE PROCEDURE
APTT 30.1 01/17/2025 04:27 AM       HCG (If Applicable): No results found for: \"PREGTESTUR\", \"PREGSERUM\", \"HCG\", \"HCGQUANT\"     ABGs: No results found for: \"PHART\", \"PO2ART\", \"UCV1AEP\", \"TVN1KSY\", \"BEART\", \"D0QHOSTM\"     Type & Screen (If Applicable):  Lab Results   Component Value Date    ABORH A POSITIVE 12/26/2024    LABANTI NEGATIVE 12/26/2024       Drug/Infectious Status (If Applicable):  No results found for: \"HIV\", \"HEPCAB\"    COVID-19 Screening (If Applicable): No results found for: \"COVID19\"        Anesthesia Evaluation  Patient summary reviewed   no history of anesthetic complications:   Airway: Mallampati: II  TM distance: >3 FB   Neck ROM: full  Mouth opening: > = 3 FB   Dental:      Comment: Denies loose teeth    Pulmonary: breath sounds clear to auscultation      (-) COPD, asthma, shortness of breath, recent URI and sleep apnea                           Cardiovascular:    (+) hypertension:, hyperlipidemia    (-) valvular problems/murmurs, past MI, CAD, CABG/stent, dysrhythmias,  angina, murmur and no pulmonary hypertension      Rhythm: regular  Rate: normal                    Neuro/Psych:   (+) CVA: residual symptoms, neuromuscular disease:, TIA   (-) seizures           GI/Hepatic/Renal:   (+) GERD:     (-) PUD, hepatitis, liver disease and no renal disease      ROS comment: Has G tube currently. Wife states TF stopped by 2230. .   Endo/Other:    (+) malignancy/cancer.    (-) diabetes mellitus, hypothyroidism, hyperthyroidism, blood dyscrasia               Abdominal:             Vascular:   + PE.       Other Findings:             Anesthesia Plan      MAC     ASA 3       Induction: intravenous.      Anesthetic plan and risks discussed with patient and spouse.      Plan discussed with CRNA.                    Ayala Haro MD   7/30/2025

## 2025-07-30 NOTE — ANESTHESIA POSTPROCEDURE EVALUATION
Department of Anesthesiology  Postprocedure Note    Patient: Edward Barlow  MRN: [id number]  YOB: 1950  Date of evaluation: 7/30/2025    Procedure Summary       Date: 07/30/25 Room / Location: [place] [address] / [facility]    Anesthesia Start: 1046 Anesthesia Stop: 1121    Procedure: GASTROJEJUNOSTOMY PLACEMENT Diagnosis:       Bile duct obstruction (HCC)      Loss of appetite      (Bile duct obstruction (HCC) [K83.1])      (Loss of appetite [R63.0])    Surgeons: Tom Dias MD Responsible Provider: Ayala Haro MD    Anesthesia Type: MAC ASA Status: 3            Anesthesia Type: No value filed.    Adrianna Phase I: Adrianna Score: 10    Adrianna Phase II: Adrianna Score: 9    Anesthesia Post Evaluation    Patient location during evaluation: PACU  Patient participation: complete - patient participated  Level of consciousness: awake  Airway patency: patent  Nausea & Vomiting: no nausea and no vomiting  Cardiovascular status: hemodynamically stable  Respiratory status: acceptable  Hydration status: stable  Pain management: adequate    No notable events documented.

## 2025-07-30 NOTE — H&P
Gastroenterology Note             Pre-operative History and Physical    Patient: Edward Barlow  : 1950  CSN:     History Obtained From:  patient and/or guardian.     HISTORY OF PRESENT ILLNESS:    The patient is a 74 y.o. male  here for EGD  This is a very pleasant 74-year-old male comes in today we recently had a eating study which showed that his stomach had a marked delay in gastric emptying    We are going to do a couple things today #1 we are going to change his G-tube to a GJ tube    #2 we are also going to inject Botox to see if this will stimulate his gastric emptying    Past Medical History:    Past Medical History:   Diagnosis Date    Anticoagulant long-term use 2024    Pulmonary embolism    Bladder cancer (HCC) 2014    Celiac disease     GERD (gastroesophageal reflux disease) 2024    Pancreatitis, bowl blockage 2025    Hearing loss     Bilateral Hearing aids    Hx of blood clots     Hyperlipidemia     Hypertension     No BP Meds since 2025- has been WNL    Nausea and vomiting 2025    Pancreatitis     Pulmonary embolism (HCC) 2024    TIA (transient ischemic attack)     No deficits    Uses feeding tube     Nightly     Past Surgical History:    Past Surgical History:   Procedure Laterality Date    CHOLECYSTECTOMY, LAPAROSCOPIC N/A 2025    Laparoscopic cholecystectomy with cholangiogram performed by Dimitry Garcia MD at [facility] OR    ERCP N/A 10/23/2024    ENDOSCOPIC RETROGRADE CHOLANGIOPANCREATOGRAPHY SPHINCTER/PAPILLOTOMY performed by Tom Dias MD at [facility] ENDOSCOPY    ERCP N/A 10/23/2024    ENDOSCOPIC RETROGRADE CHOLANGIOPANCREATOGRAPHY STONE REMOVAL performed by Tom Dias MD at [facility] ENDOSCOPY    ERCP N/A 10/23/2024    ENDOSCOPIC RETROGRADE CHOLANGIOPANCREATOGRAPHY STENT INSERTION performed by Tom Dias MD at [facility] ENDOSCOPY    ERCP N/A 2025    ENDOSCOPIC RETROGRADE CHOLANGIOPANCREATOGRAPHY STENT REMOVAL performed by Tom Dias MD at

## (undated) DEVICE — ENDOSCOPIC KIT 1.1+ 6 FT 2 GWN AAMI LEVEL 3

## (undated) DEVICE — CONMED SCOPE SAVER BITE BLOCK, 20X27 MM: Brand: SCOPE SAVER

## (undated) DEVICE — BW-412T DISP COMBO CLEANING BRUSH: Brand: SINGLE USE COMBINATION CLEANING BRUSH

## (undated) DEVICE — STRAP POS W5XL72IN FOAM KNEE AND BODY HK LOOP CLSR DISP

## (undated) DEVICE — Device: Brand: BALLOON3

## (undated) DEVICE — SUTURE VICRYL + SZ 0 L27IN ABSRB VLT L26MM UR-6 5/8 CIR VCP603H

## (undated) DEVICE — AIR/WATER CLEANING ADAPTER FOR OLYMPUS® GI ENDOSCOPE: Brand: BULLDOG®

## (undated) DEVICE — ERBE NESSY®PLATE 170 SPLIT; 168CM²; CABLE 3M: Brand: ERBE

## (undated) DEVICE — CORFLO* NASOINTESTINAL ENDOSCOPICALLY PLACED FEEDING TUBE WITH ENFIT® CONNECTOR: Brand: CORPAK ENTERIC FEEDING TUBE W/GUIDE TIP

## (undated) DEVICE — INDICATED FOR USE DURING OPEN AND LAPAROSCOPIC CHOLECYSTECTOMY PROCEDURES TO INJECT RADIOPAQUE MEDIA THROUGH THE CYSTIC DUCT INTO THE BILIARY TREE.: Brand: AEROSTAT®

## (undated) DEVICE — SINGLE USE AIR/WATER, SUCTION AND BIOPSY VALVES SET: Brand: ORCAPOD™

## (undated) DEVICE — SYRINGE BLB 60 CC TIP PROTECTOR STRL LF

## (undated) DEVICE — SOLUTION IRRIG 1000ML STRL H2O USP PLAS POUR BTL

## (undated) DEVICE — Device

## (undated) DEVICE — MEMORY EIGHT WIRE BASKET: Brand: MEMORY BASKET

## (undated) DEVICE — 2, DISPOSABLE SUCTION/IRRIGATOR WITH DISPOSABLE TIP: Brand: STRYKEFLOW

## (undated) DEVICE — GLOVE SURG SZ 75 L12IN FNGR THK79MIL GRN LTX FREE

## (undated) DEVICE — SYRINGE MED 10ML SLIP TIP BLNT FILL AND LUERLOCK DISP

## (undated) DEVICE — TROCAR: Brand: KII SHIELDED BLADED ACCESS SYSTEM

## (undated) DEVICE — BW-400L DISP SNGL-END CLEANINGBRUSH: Brand: OLYMPUS

## (undated) DEVICE — HOLDER SCALP PLAS G STD

## (undated) DEVICE — TUBING, SUCTION, 1/4" X 12', STRAIGHT: Brand: MEDLINE

## (undated) DEVICE — BRUSH CLN L220CM DIA5MM ZAH DISP FOR WRK CHAN DIA2.8/4.2MM

## (undated) DEVICE — SINGLE-USE POLYPECTOMY SNARE: Brand: CAPTIVATOR

## (undated) DEVICE — RETRIEVAL BALLOON CATHETER: Brand: EXTRACTOR™ PRO XL

## (undated) DEVICE — CANNULATING SPHINCTEROTOME: Brand: TRUETOME DREAMWIRE 44

## (undated) DEVICE — LIQUIBAND RAPID ADHESIVE 36/CS 0.8ML: Brand: MEDLINE

## (undated) DEVICE — FORCEPS BX PED L160CM JAW DIA1.8MM WRK CHN 2MM W/ NDL DISP

## (undated) DEVICE — 1 ML TUBERCULIN SYRINGE LUER-LOCK TIP: Brand: MONOJECT

## (undated) DEVICE — ELECTRODE PT RET AD L9FT HI MOIST COND ADH HYDRGEL CORDED

## (undated) DEVICE — SYRINGE 20ML LL S/C 50

## (undated) DEVICE — GOWN SIRUS NONREIN XL W/TWL: Brand: MEDLINE INDUSTRIES, INC.

## (undated) DEVICE — TUBING INSUFFLATOR HEAT HUMIDIFIED SMK EVAC SET PNEUMOCLEAR

## (undated) DEVICE — DRAPE C ARM UNIV W41XL74IN CLR PLAS XR VELC CLSR POLY STRP

## (undated) DEVICE — PENCIL ES L3M BTTN SWCH S STL HEX LOK BLDE ELECTRD HOLSTER

## (undated) DEVICE — LAPAROSCOPY PK

## (undated) DEVICE — INSUFFLATION NEEDLE TO ESTABLISH PNEUMOPERITONEUM.: Brand: INSUFFLATION NEEDLE

## (undated) DEVICE — FLUID TRAP FOR MINIVAC ES EQUIP FLD TRAP

## (undated) DEVICE — ELECTRODE ELECSURG SPATULA 36 CM CRV SLD PTFE COAT CLEANCOAT

## (undated) DEVICE — 4-PORT MANIFOLD: Brand: NEPTUNE 2

## (undated) DEVICE — APPLIER CLP M/L SHFT DIA5MM 15 LIG LIGAMAX 5

## (undated) DEVICE — RESCUE COMBO FORCEPS

## (undated) DEVICE — AGENT HEMSTAT 3GM PURIFIED PLNT STARCH PWD ABSRB ARISTA AH

## (undated) DEVICE — SINGLE USE ASPIRATION NEEDLE: Brand: SINGLE USE ASPIRATION NEEDLE NA-U200H

## (undated) DEVICE — APPLICATOR MEDICATED 26 CC SOLUTION HI LT ORNG CHLORAPREP

## (undated) DEVICE — APPLICATOR SURG XL L38CM FOR ARISTA ABSRB HEMSTAT FLEXITIP

## (undated) DEVICE — CANNULATING SPHINCTEROTOME: Brand: TRUETOME JAG 44

## (undated) DEVICE — TISSUE RETRIEVAL SYSTEM: Brand: INZII RETRIEVAL SYSTEM

## (undated) DEVICE — GLOVE ORANGE PI 7   MSG9070

## (undated) DEVICE — SUTURE ABSORBABLE L 18 IN SZ 4-0 NDL L 19 MM POLYGLACTIN 910 36/BX

## (undated) DEVICE — CLIP NSL 12-14FR DK BLU FOR NSL TB RET SYS AMT BRIDLE PRO

## (undated) DEVICE — PMI DISPOSABLE PUNCTURE CLOSURE DEVICE / SUTURE GRASPER: Brand: PMI

## (undated) DEVICE — NEEDLE INJ ARTC 2.5MMX230CM

## (undated) DEVICE — GENERAL LAPAROSCOPY: Brand: MEDLINE INDUSTRIES, INC.

## (undated) DEVICE — Device: Brand: SINGLE USE ASPIRATION NEEDLE NA-U200H

## (undated) DEVICE — PEG KIT STD 20 FR PUSH W/ ENFIT ENDOVIVE

## (undated) DEVICE — TROCAR: Brand: KII® SLEEVE